# Patient Record
Sex: FEMALE | Race: ASIAN | NOT HISPANIC OR LATINO | Employment: FULL TIME | URBAN - METROPOLITAN AREA
[De-identification: names, ages, dates, MRNs, and addresses within clinical notes are randomized per-mention and may not be internally consistent; named-entity substitution may affect disease eponyms.]

---

## 2017-02-13 ENCOUNTER — HOSPITAL ENCOUNTER (OUTPATIENT)
Dept: MAMMOGRAPHY | Facility: HOSPITAL | Age: 52
Discharge: HOME | End: 2017-02-13
Attending: OBSTETRICS & GYNECOLOGY

## 2017-02-13 DIAGNOSIS — Z12.31 VISIT FOR SCREENING MAMMOGRAM: ICD-10-CM

## 2017-04-10 ENCOUNTER — OFFICE VISIT (OUTPATIENT)
Dept: FAMILY MEDICINE | Facility: CLINIC | Age: 52
End: 2017-04-10

## 2017-04-10 VITALS
BODY MASS INDEX: 27.49 KG/M2 | TEMPERATURE: 97.6 F | HEART RATE: 72 BPM | DIASTOLIC BLOOD PRESSURE: 92 MMHG | RESPIRATION RATE: 20 BRPM | SYSTOLIC BLOOD PRESSURE: 149 MMHG | HEIGHT: 64 IN | OXYGEN SATURATION: 97 % | WEIGHT: 161 LBS

## 2017-04-10 DIAGNOSIS — D3A.00 CARCINOID TUMOR (H): ICD-10-CM

## 2017-04-10 DIAGNOSIS — J31.0 CHRONIC RHINITIS: ICD-10-CM

## 2017-04-10 DIAGNOSIS — J45.30 MILD PERSISTENT ASTHMA WITHOUT COMPLICATION: Primary | ICD-10-CM

## 2017-04-10 DIAGNOSIS — N92.0 EXCESSIVE OR FREQUENT MENSTRUATION: ICD-10-CM

## 2017-04-10 DIAGNOSIS — N94.6 DYSMENORRHEA: ICD-10-CM

## 2017-04-10 RX ORDER — FLUTICASONE PROPIONATE 220 UG/1
2 AEROSOL, METERED RESPIRATORY (INHALATION) 2 TIMES DAILY
COMMUNITY
End: 2017-04-10

## 2017-04-10 RX ORDER — FLUTICASONE PROPIONATE 50 MCG
2 SPRAY, SUSPENSION (ML) NASAL DAILY
COMMUNITY
End: 2017-04-10

## 2017-04-10 RX ORDER — ALBUTEROL SULFATE 90 UG/1
2 AEROSOL, METERED RESPIRATORY (INHALATION) EVERY 6 HOURS
Qty: 3 INHALER | Refills: 3 | Status: SHIPPED | OUTPATIENT
Start: 2017-04-10 | End: 2018-04-20

## 2017-04-10 RX ORDER — FLUTICASONE PROPIONATE 50 MCG
2 SPRAY, SUSPENSION (ML) NASAL DAILY
Qty: 3 BOTTLE | Refills: 3 | Status: SHIPPED | OUTPATIENT
Start: 2017-04-10 | End: 2019-08-06

## 2017-04-10 RX ORDER — FLUTICASONE PROPIONATE 220 UG/1
2 AEROSOL, METERED RESPIRATORY (INHALATION) 2 TIMES DAILY
Qty: 3 INHALER | Refills: 3 | Status: SHIPPED | OUTPATIENT
Start: 2017-04-10 | End: 2018-04-20

## 2017-04-10 RX ORDER — ALBUTEROL SULFATE 90 UG/1
2 AEROSOL, METERED RESPIRATORY (INHALATION) EVERY 6 HOURS
COMMUNITY
End: 2017-04-10

## 2017-04-10 ASSESSMENT — PATIENT HEALTH QUESTIONNAIRE - PHQ9: 5. POOR APPETITE OR OVEREATING: NOT AT ALL

## 2017-04-10 ASSESSMENT — ANXIETY QUESTIONNAIRES
6. BECOMING EASILY ANNOYED OR IRRITABLE: NOT AT ALL
3. WORRYING TOO MUCH ABOUT DIFFERENT THINGS: NOT AT ALL
5. BEING SO RESTLESS THAT IT IS HARD TO SIT STILL: NOT AT ALL
1. FEELING NERVOUS, ANXIOUS, OR ON EDGE: NOT AT ALL
2. NOT BEING ABLE TO STOP OR CONTROL WORRYING: NOT AT ALL
IF YOU CHECKED OFF ANY PROBLEMS ON THIS QUESTIONNAIRE, HOW DIFFICULT HAVE THESE PROBLEMS MADE IT FOR YOU TO DO YOUR WORK, TAKE CARE OF THINGS AT HOME, OR GET ALONG WITH OTHER PEOPLE: NOT DIFFICULT AT ALL
7. FEELING AFRAID AS IF SOMETHING AWFUL MIGHT HAPPEN: NOT AT ALL
GAD7 TOTAL SCORE: 0

## 2017-04-10 NOTE — NURSING NOTE
"51 year old  Chief Complaint   Patient presents with     [facility]     Asthma     Started with seasonal allergies, developed in to asthma x 10--15 years ago.       Blood pressure (!) 149/92, pulse 72, temperature 97.6  F (36.4  C), temperature source Oral, resp. rate 20, height 5' 4\" (162.6 cm), weight 161 lb (73 kg), SpO2 97 %, not currently breastfeeding. Body mass index is 27.64 kg/(m^2).  BP completed using cuff size: regular    There is no problem list on file for this patient.      Wt Readings from Last 2 Encounters:   04/10/17 161 lb (73 kg)     BP Readings from Last 3 Encounters:   04/10/17 (!) 149/92       Current Outpatient Prescriptions   Medication     albuterol (PROAIR HFA/PROVENTIL HFA/VENTOLIN HFA) 108 (90 BASE) MCG/ACT Inhaler     fluticasone (FLOVENT HFA) 220 MCG/ACT Inhaler     fluticasone (FLONASE) 50 MCG/ACT spray     No current facility-administered medications for this visit.        Social History   Substance Use Topics     Smoking status: Never Smoker     Smokeless tobacco: Not on file     Alcohol use Not on file       Health Maintenance Due   Topic Date Due     ASTHMA ACTION PLAN Q1 YR (NO INBASKET)  08/08/1971     ASTHMA CONTROL TEST Q6 MOS (NO INBASKET)  08/08/1971     TETANUS IMMUNIZATION (SYSTEM ASSIGNED)  08/08/1984     PAP SCREENING Q3 YR (SYSTEM ASSIGNED)  08/08/1987     MAMMO SCREEN Q2 YR (SYSTEM ASSIGNED)  08/08/2006     LIPID SCREEN Q5 YR FEMALE (SYSTEM ASSIGNED)  08/08/2011     COLON CANCER SCREEN (SYSTEM ASSIGNED)  08/08/2016       No results found for: PAP    PHQ-2 ( 1999 [employer]) 4/10/2017   Q1: Little interest or pleasure in doing things 0   Q2: Feeling down, depressed or hopeless 0   PHQ-2 Score 0       PHQ-9 SCORE 4/10/2017   Total Score 0       CLAYTON-7 SCORE 4/10/2017   Total Score 0       ACT Total Scores 4/10/2017   ACT TOTAL SCORE (Goal Greater than or Equal to 20) 20   In the past 12 months, how many times did you visit the emergency room for your asthma without being " admitted to the hospital? 0   In the past 12 months, how many times were you hospitalized overnight because of your asthma? 0       Elayne Argueta CMA  April 10, 2017 3:35 PM

## 2017-04-10 NOTE — MR AVS SNAPSHOT
After Visit Summary   4/10/2017    Susie Dupont    MRN: [id number]           Patient Information     Date Of Birth          1965        Visit Information        Provider Department      4/10/2017 3:30 PM Kristel Canales APRN [facility] [employer] [facility]        Today's Diagnoses     Mild persistent asthma without complication    -  1    Chronic rhinitis          Care Instructions      Patient Education    Montelukast Sodium Chewable tablet    Montelukast Sodium Oral granules    Montelukast Sodium Oral tablet  Montelukast Sodium Oral tablet  What is this medicine?  MONTELUKAST (mon te LOO kast) is used to prevent and treat the symptoms of asthma. It is also used to treat allergies. Do not use for an acute asthma attack.  This medicine may be used for other purposes; ask your health care provider or pharmacist if you have questions.  What should I tell my health care provider before I take this medicine?  They need to know if you have any of these conditions:    liver disease    an unusual or allergic reaction to montelukast, other medicines, foods, dyes, or preservatives    pregnant or trying to get pregnant    breast-feeding  How should I use this medicine?  This medicine should be given by mouth. Follow the directions on the prescription label. Take this medicine at the same time every day. You may take this medicine with or without meals. Do not chew the tablets. Do not stop taking your medicine unless your doctor tells you to.  Talk to your pediatrician regarding the use of this medicine in children. Special care may be needed. While this drug may be prescribed for children as young as 15 years of age for selected conditions, precautions do apply.  Overdosage: If you think you have taken too much of this medicine contact a poison control center or emergency room at once.  NOTE: This medicine is only for you. Do not share this medicine with others.  What if I miss a dose?  If you miss a dose,  take it as soon as you can. If it is almost time for your next dose, take only that dose. Do not take double or extra doses.  What may interact with this medicine?    anti-infectives like rifampin and rifabutin    medicines for diabetes like rosiglitazone and repaglinide    medicines for seizures like phenytoin, phenobarbital, and carbamazepine    paclitaxel  This list may not describe all possible interactions. Give your health care provider a list of all the medicines, herbs, non-prescription drugs, or dietary supplements you use. Also tell them if you smoke, drink alcohol, or use illegal drugs. Some items may interact with your medicine.  What should I watch for while using this medicine?  Visit your doctor or health care professional for regular checks on your progress. Tell your doctor or health care professional if your allergy or asthma symptoms do not improve. Take your medicine even when you do not have symptoms. Do not stop taking any of your medicine(s) unless your doctor tells you to.  If you have asthma, talk to your doctor about what to do in an acute asthma attack. Always have your inhaled rescue medicine for asthma attacks with you.  Patients and their families should watch for new or worsening thoughts of suicide or depression. Also watch for sudden changes in feelings such as feeling anxious, agitated, panicky, irritable, hostile, aggressive, impulsive, severely restless, overly excited and hyperactive, or not being able to sleep. Any worsening of mood or thoughts of suicide or dying should be reported to your health care professional right away.  What side effects may I notice from receiving this medicine?  Side effects that you should report to your doctor or health care professional as soon as possible:    allergic reactions like skin rash or hives, or swelling of the face, lips, or tongue    breathing problems    confusion    dark urine    fever or infection    flu-like  symptoms    hallucinations    painful lumps under the skin    pain, tingling, numbness in the hands or feet    sinus pain or swelling    suicidal thoughts or other mood changes    trouble sleeping    unusual bleeding or bruising    yellowing of the eyes or skin  Side effects that usually do not require medical attention (report to your doctor or health care professional if they continue or are bothersome):    cough    dizziness    drowsiness    headache    nightmares    stomach upset    stuffy nose  This list may not describe all possible side effects. Call your doctor for medical advice about side effects. You may report side effects to FDA at 3-100-STJ-7838.  Where should I keep my medicine?  Keep out of the reach of children.  Store at room temperature between 15 and 30 degrees C (59 and 86 degrees F). Protect from light and moisture. Keep this medicine in the original bottle. Throw away any unused medicine after the expiration date.  NOTE:This sheet is a summary. It may not cover all possible information. If you have questions about this medicine, talk to your doctor, pharmacist, or health care provider. Copyright  2016 [employer] Standard              Follow-ups after your visit        Follow-up notes from your care team     Return in about 2 months (around 6/10/2017).      Who to contact     Please call your clinic at [phone] to:    Ask questions about your health    Make or cancel appointments    Discuss your medicines    Learn about your test results    Speak to your doctor   If you have compliments or concerns about an experience at your clinic, or if you wish to file a complaint, please contact [facility] Patient Relations at [phone] or email us at [email or web address]         Additional Information About Your Visit        [email or web address].com Information     [email or web address] is an electronic gateway that provides easy, online access to your medical records. With Decide.com, you can  "request a clinic appointment, read your test results, renew a prescription or communicate with your care team.     To sign up for [employer] visit the website at [email or web address]   You will be asked to enter the access code listed below, as well as some personal information. Please follow the directions to create your username and password.     Your access code is: [email or web address]  Expires: 2017  4:30 PM     Your access code will  in 90 days. If you need help or a new code, please contact your [facility] or call [phone] for assistance.        Care EveryWhere ID     This is your Care EveryWhere ID. This could be used by other organizations to access your [facility] medical records  [id number]        Your Vitals Were     Pulse Temperature Respirations Height Pulse Oximetry Breastfeeding?    72 97.6  F (36.4  C) (Oral) 20 5' 4\" (162.6 cm) 97% No    BMI (Body Mass Index)                   27.64 kg/m2            Blood Pressure from Last 3 Encounters:   04/10/17 (!) 149/92    Weight from Last 3 Encounters:   04/10/17 161 lb (73 kg)              Today, you had the following     No orders found for display         Where to get your medicines      These medications were sent to [facility]/pharmacy #[id number] - [place], [place] - [address] [address]     Phone:  [phone]     albuterol 108 (90 BASE) MCG/ACT Inhaler    fluticasone 220 MCG/ACT Inhaler    fluticasone 50 MCG/ACT spray          Primary Care Provider Office Phone # Fax #    LAZARA Singleton [facility] [phone]       [facility] NURSES [facility] [address] [place] [id number]        Thank you!     Thank you for choosing [facility]  for your care. Our goal is always to provide you with excellent care. Hearing back from our patients is one way we can continue to improve our services. Please take a few minutes to complete the written survey that you may receive in the " mail after your visit with us. Thank you!             Your Updated Medication List - Protect others around you: Learn how to safely use, store and throw away your medicines at www.disposemymeds.org.          This list is accurate as of: 4/10/17  4:30 PM.  Always use your most recent med list.                   Brand Name Dispense Instructions for use    albuterol 108 (90 BASE) MCG/ACT Inhaler    PROAIR HFA/PROVENTIL HFA/VENTOLIN HFA    3 Inhaler    Inhale 2 puffs into the lungs every 6 hours       fluticasone 220 MCG/ACT Inhaler    FLOVENT HFA    3 Inhaler    Inhale 2 puffs into the lungs 2 times daily       fluticasone 50 MCG/ACT spray    FLONASE    3 Bottle    Spray 2 sprays into both nostrils daily

## 2017-04-10 NOTE — PATIENT INSTRUCTIONS
Patient Education    Montelukast Sodium Chewable tablet    Montelukast Sodium Oral granules    Montelukast Sodium Oral tablet  Montelukast Sodium Oral tablet  What is this medicine?  MONTELUKAST (mon te LOO kast) is used to prevent and treat the symptoms of asthma. It is also used to treat allergies. Do not use for an acute asthma attack.  This medicine may be used for other purposes; ask your health care provider or pharmacist if you have questions.  What should I tell my health care provider before I take this medicine?  They need to know if you have any of these conditions:    liver disease    an unusual or allergic reaction to montelukast, other medicines, foods, dyes, or preservatives    pregnant or trying to get pregnant    breast-feeding  How should I use this medicine?  This medicine should be given by mouth. Follow the directions on the prescription label. Take this medicine at the same time every day. You may take this medicine with or without meals. Do not chew the tablets. Do not stop taking your medicine unless your doctor tells you to.  Talk to your pediatrician regarding the use of this medicine in children. Special care may be needed. While this drug may be prescribed for children as young as 15 years of age for selected conditions, precautions do apply.  Overdosage: If you think you have taken too much of this medicine contact a poison control center or emergency room at once.  NOTE: This medicine is only for you. Do not share this medicine with others.  What if I miss a dose?  If you miss a dose, take it as soon as you can. If it is almost time for your next dose, take only that dose. Do not take double or extra doses.  What may interact with this medicine?    anti-infectives like rifampin and rifabutin    medicines for diabetes like rosiglitazone and repaglinide    medicines for seizures like phenytoin, phenobarbital, and carbamazepine    paclitaxel  This list may not describe all possible  interactions. Give your health care provider a list of all the medicines, herbs, non-prescription drugs, or dietary supplements you use. Also tell them if you smoke, drink alcohol, or use illegal drugs. Some items may interact with your medicine.  What should I watch for while using this medicine?  Visit your doctor or health care professional for regular checks on your progress. Tell your doctor or health care professional if your allergy or asthma symptoms do not improve. Take your medicine even when you do not have symptoms. Do not stop taking any of your medicine(s) unless your doctor tells you to.  If you have asthma, talk to your doctor about what to do in an acute asthma attack. Always have your inhaled rescue medicine for asthma attacks with you.  Patients and their families should watch for new or worsening thoughts of suicide or depression. Also watch for sudden changes in feelings such as feeling anxious, agitated, panicky, irritable, hostile, aggressive, impulsive, severely restless, overly excited and hyperactive, or not being able to sleep. Any worsening of mood or thoughts of suicide or dying should be reported to your health care professional right away.  What side effects may I notice from receiving this medicine?  Side effects that you should report to your doctor or health care professional as soon as possible:    allergic reactions like skin rash or hives, or swelling of the face, lips, or tongue    breathing problems    confusion    dark urine    fever or infection    flu-like symptoms    hallucinations    painful lumps under the skin    pain, tingling, numbness in the hands or feet    sinus pain or swelling    suicidal thoughts or other mood changes    trouble sleeping    unusual bleeding or bruising    yellowing of the eyes or skin  Side effects that usually do not require medical attention (report to your doctor or health care professional if they continue or are bothersome):    cough     dizziness    drowsiness    headache    nightmares    stomach upset    stuffy nose  This list may not describe all possible side effects. Call your doctor for medical advice about side effects. You may report side effects to FDA at 5-724-IPP-3276.  Where should I keep my medicine?  Keep out of the reach of children.  Store at room temperature between 15 and 30 degrees C (59 and 86 degrees F). Protect from light and moisture. Keep this medicine in the original bottle. Throw away any unused medicine after the expiration date.  NOTE:This sheet is a summary. It may not cover all possible information. If you have questions about this medicine, talk to your doctor, pharmacist, or health care provider. Copyright  2016 Gold Standard        My Asthma Action Plan  Name: Susie Dupont   YOB: 1965  Date: 4/11/2017   My doctor: LAZARA Morales CNP   My clinic: [facility]        My Control Medicine: Fluticasone propionate (Flovent) -   mcg 2puff BID  My Rescue Medicine: Albuterol (Proair/Ventolin/Proventil) inhaler 2 puffs prior to activity, 2 puff q6hp SOB, Wheezing   My Asthma Severity: mild persistent  Avoid your asthma triggers: pollens and use rescue inhaler prior to vigorous exercise               GREEN ZONE     Good Control    I feel good    No cough or wheeze    Can work, sleep and play without asthma symptoms       Take your asthma control medicine every day.     1. If exercise triggers your asthma, take your rescue medication    15 minutes before exercise or sports, and    During exercise if you have asthma symptoms  2. Spacer to use with inhaler: If you have a spacer, make sure to use it with your inhaler             YELLOW ZONE     Getting Worse  I have ANY of these:    I do not feel good    Cough or wheeze    Chest feels tight    Wake up at night   1. Keep taking your Green Zone medications  2. Start taking your rescue medicine:    every 20 minutes for up to 1 hour. Then  every 4 hours for 24-48 hours.  3. If you stay in the Yellow Zone for more than 12-24 hours, contact your doctor.  4. If you do not return to the Green Zone in 12-24 hours or you get worse, start taking your oral steroid medicine if prescribed by your provider.           RED ZONE     Medical Alert - Get Help  I have ANY of these:    I feel awful    Medicine is not helping    Breathing getting harder    Trouble walking or talking    Nose opens wide to breathe       1. Take your rescue medicine NOW  2. If your provider has prescribed an oral steroid medicine, start taking it NOW  3. Call your doctor NOW  4. If you are still in the Red Zone after 20 minutes and you have not reached your doctor:    Take your rescue medicine again and    Call 911 or go to the emergency room right away    See your regular doctor within 2 weeks of an Emergency Room or Urgent Care visit for follow-up treatment.        Electronically signed by: Kristel Canales, April 11, 2017    Annual Reminders:  Meet with Asthma Educator,  Flu Shot in the Fall, consider Pneumonia Vaccination for patients with asthma (aged 19 and older).    Pharmacy: [facility]/PHARMACY #[id number] [place] [address] [address]                    Asthma Triggers  How To Control Things That Make Your Asthma Worse    Triggers are things that make your asthma worse.  Look at the list below to help you find your triggers and what you can do about them.  You can help prevent asthma flare-ups by staying away from your triggers.      Trigger                                                          What you can do   Cigarette Smoke  Tobacco smoke can make asthma worse. Do not allow smoking in your home, car or around you.  Be sure no one smokes at a child s day care or school.  If you smoke, ask your health care provider for ways to help you quit.  Ask family members to quit too.  Ask your health care provider for a referral to Quit Plan to help you quit smoking, or call  6-250-967-PLAN.     Colds, Flu, Bronchitis  These are common triggers of asthma. Wash your hands often.  Don t touch your eyes, nose or mouth.  Get a flu shot every year.     Dust Mites  These are tiny bugs that live in cloth or carpet. They are too small to see. Wash sheets and blankets in hot water every week.   Encase pillows and mattress in dust mite proof covers.  Avoid having carpet if you can. If you have carpet, vacuum weekly.   Use a dust mask and HEPA vacuum.   Pollen and Outdoor Mold  Some people are allergic to trees, grass, or weed pollen, or molds. Try to keep your windows closed.  Limit time out doors when pollen count is high.   Ask you health care provider about taking medicine during allergy season.     Animal Dander  Some people are allergic to skin flakes, urine or saliva from pets with fur or feathers. Keep pets with fur or feathers out of your home.    If you can t keep the pet outdoors, then keep the pet out of your bedroom.  Keep the bedroom door closed.  Keep pets off cloth furniture and away from stuffed toys.     Mice, Rats, and Cockroaches  Some people are allergic to the waste from these pests.   Cover food and garbage.  Clean up spills and food crumbs.  Store grease in the refrigerator.   Keep food out of the bedroom.   Indoor Mold  This can be a trigger if your home has high moisture. Fix leaking faucets, pipes, or other sources of water.   Clean moldy surfaces.  Dehumidify basement if it is damp and smelly.   Smoke, Strong Odors, and Sprays  These can reduce air quality. Stay away from strong odors and sprays, such as perfume, powder, hair spray, paints, smoke incense, paint, cleaning products, candles and new carpet.   Exercise or Sports  Some people with asthma have this trigger. Be active!  Ask your doctor about taking medicine before sports or exercise to prevent symptoms.    Warm up for 5-10 minutes before and after sports or exercise.     Other Triggers of Asthma  Cold air:   Cover your nose and mouth with a scarf.  Sometimes laughing or crying can be a trigger.  Some medicines and food can trigger asthma.

## 2017-04-11 PROBLEM — D3A.00 CARCINOID TUMOR (H): Status: ACTIVE | Noted: 2017-04-11

## 2017-04-11 PROBLEM — J45.30 MILD PERSISTENT ASTHMA WITHOUT COMPLICATION: Status: ACTIVE | Noted: 2017-04-11

## 2017-04-11 PROBLEM — J31.0 CHRONIC RHINITIS: Status: ACTIVE | Noted: 2017-04-11

## 2017-04-11 PROBLEM — N92.0 EXCESSIVE OR FREQUENT MENSTRUATION: Status: ACTIVE | Noted: 2017-04-11

## 2017-04-11 PROBLEM — N94.6 DYSMENORRHEA: Status: ACTIVE | Noted: 2017-04-11

## 2017-04-11 ASSESSMENT — ASTHMA QUESTIONNAIRES: ACT_TOTALSCORE: 20

## 2017-04-11 ASSESSMENT — PATIENT HEALTH QUESTIONNAIRE - PHQ9: SUM OF ALL RESPONSES TO PHQ QUESTIONS 1-9: 0

## 2017-04-11 ASSESSMENT — ANXIETY QUESTIONNAIRES: GAD7 TOTAL SCORE: 0

## 2017-04-11 NOTE — PROGRESS NOTES
HPI:       Susie Dupont is a 51 year old who presents today to establish care and to discuss her asthma regimen. She was previously seeing a primary care physician who retired in February. She saw him fall of 2016. She is unsure if she had labs or not.     Asthma  She states she has been asthmatic for approximately 15 years. It first started as seasonal allergies, then wheezing and difficulty breathing. She has been off of her flovent and flonase for 1 year. She moved to [facility] around that time and noticed her allergy symptoms were greatly improved. Knowing allergens are triggers for her asthma she wondered if that too would improve. She has noticed an increase in her allergy symptoms now that the season is changing, she also notices a tightness in her chest. She is unsure when the last time she did spirometry was. She does take her albuterol inhaler every morning prior to exercise as cardio exercise will trigger her asthma as well. She doesn't need her albuterol for rescue unless over exerts herself or comes in contact with allergens. She doesn't know if she has specific allergies.     Carcinoid of the lung  States she has 2 carcinoids of the right lower and middle lung. These were resected years ago and has not had any follow up since. Pulmonology stated she didn't need further surveillance.     Menometrorrhagia  States she has had problems all her life, runs in her family. She previously had very heavy and very long periods. Also very painful. She has tried non hormone eluding IUDs and most recently had her merena replaced. She states they work best to control bleeding and pain the first couple of years then decline in efficacy after this time. She still has periods and most recently a few months ago had to leave work due to heaviness of bleeding. She is following with OB/GYN for this. She may need ablation or hysterectomy at some point, but trying to avoid this.        Preventative  Has not had a  colonoscopy, but does know she needs one. Recommended to get this done  Mammogram done in 2016-this was ordered by her OB/GYN and was normal, she had breast discharge and was concerned  Flu-Vaccine is done this year  Uptodate on dental and eye exams  She has not had pneumovax, but does want this.     Asthma Follow Up    Concerns medication regimen and long term steroid use       Description:  Cough: Yes Details: at night  Wheezing:  Yes Details: with exercise and at night on occassion  Shortness of breath:  no  Fevers:  no  Fatigue: no    Decreased appetite:no  Chest pain or discomfort: Yes Details: currently yes with season change, in bases of lungs  Leg (swelling) edema: no  Nasal congestion: no  Sore throat: no  Palpitations:no       How often are current asthma medications being used:                  Controller medicine daily:  Yes Details: pt states she self discontinued flovent and flonase 1 year ago. 2 days ago she started her flovent again daily, but only taking once per day. She is on 220mcg                    Rescue nebulizer or Inhaler during an average week: Only prior to exercise daily, uses only a couple times per month for rescue           History:  Able to do normal responsibilities:  Yes Details: with exception of season changes/currently feeling chest tightness, short of breath         Do symptoms wake you at nigh no           Triggers: pollens, occupational exposure and exercise or sports        History of Urgent care/ER visits:  no  History of hospitalizations:  no      If hospitalized-History of intubation: no                                                        Asthma Control Test score for today:   ACT Total Scores 4/10/2017   ACT TOTAL SCORE (Goal Greater than or Equal to 20) 20   In the past 12 months, how many times did you visit the emergency room for your asthma without being admitted to the hospital? 0   In the past 12 months, how many times were you hospitalized overnight because of  your asthma? 0             Asthma Action Plan done this year : YES         Adherence and Exercise  Medication side effects: no  How often is a medication missed? Has not been on controller  meds for 1 year  Are you able to follow the treatment plan? Yes  Exercise:walking , biking and running 6-7 days/week for an average of 30-45 minutes     Problem, Medication and Allergy Lists were   reviewed and are current.     Patient Active Problem List    Diagnosis Date Noted     Mild persistent asthma without complication 04/11/2017     Priority: Medium     Excessive or frequent menstruation 04/11/2017     Priority: Medium     Chronic rhinitis 04/11/2017     Priority: Medium     Dysmenorrhea 04/11/2017     Priority: Medium     Carcinoid tumor 04/11/2017     Priority: Medium   ,     Current Outpatient Prescriptions   Medication Sig Dispense Refill     albuterol (PROAIR HFA/PROVENTIL HFA/VENTOLIN HFA) 108 (90 BASE) MCG/ACT Inhaler Inhale 2 puffs into the lungs every 6 hours 3 Inhaler 3     fluticasone (FLONASE) 50 MCG/ACT spray Spray 2 sprays into both nostrils daily 3 Bottle 3     fluticasone (FLOVENT HFA) 220 MCG/ACT Inhaler Inhale 2 puffs into the lungs 2 times daily 3 Inhaler 3   ,     Allergies   Allergen Reactions     Seasonal Allergies Difficulty breathing     Apples [Apple] Rash     Kiwi Rash     Patient is   a new patient to this clinic and so  I reviewed/updated the Past Medical History, the Family History and the Social History. ,   Past Medical History:   Diagnosis Date     Carcinoid tumor     right lung, upper and middle lobe resected     Fibroid (bleeding) (uterine)     chonic ongoing     Uncomplicated asthma    ,   Family History     Problem (# of Occurrences) Relation (Name,Age of Onset)    CANCER (1) Father    Coronary Artery Disease (3) Mother, Maternal Grandfather, Paternal Grandfather    Parkinsonism (1) Mother    Seasonal/Environmental Allergies (1) Sister       and   Social History     Social History      "Marital status:      Spouse name: N/A     Number of children: N/A     Years of education: N/A     Social History Main Topics     Smoking status: Never Smoker     Smokeless tobacco: None     Alcohol use Yes      Comment: 2-3 glasses of wine per week     Drug use: No     Sexual activity: Yes     Birth control/ protection: IUD      Comment: OB/GYN [place]     Other Topics Concern     None     Social History Narrative     None            Review of Systems:   Review of Systems  ROS: 10 point ROS neg other than the symptoms noted above in the HPI.            Physical Exam:     Patient Vitals for the past 24 hrs:   BP Temp Temp src Pulse Resp SpO2 Height Weight   04/10/17 1530 (!) 149/92 97.6  F (36.4  C) Oral 72 20 97 % 5' 4\" (162.6 cm) 161 lb (73 kg)     Body mass index is 27.64 kg/(m^2).  Vitals were reviewed and were normal, repeat BP is 138/72     Physical Exam  GENERAL APPEARANCE: healthy, alert and no distress  EYES: Eyes grossly normal to inspection, PERRL and conjunctivae and sclerae normal  HENT: ear canals and TM's normal, nose and mouth without ulcers or lesions, oropharynx clear and oral mucous membranes moist  NECK: no adenopathy, no asymmetry, masses, or scars and thyroid normal to palpation  RESP: lungs clear to auscultation - no rales, rhonchi or wheezes  BREAST: deferred  CV: regular rates and rhythm, normal S1 S2, no S3 or S4, no murmur, click or rub, no peripheral edema and peripheral pulses strong  ABDOMEN: soft, nontender, no hepatosplenomegaly, no masses and bowel sounds normal   (female): deferred  MS: no musculoskeletal defects are noted and gait is age appropriate without ataxia  SKIN: no suspicious lesions or rashes  NEURO: Normal strength and tone, sensory exam grossly normal, mentation intact and speech normal  PSYCH: mentation appears normal and affect normal/bright      Results:      Results from the last 24 hoursNo results found for this or any previous visit (from the past 24 " hour(s)).  Assessment and Plan     Susie was seen today for establish care and asthma.    Diagnoses and all orders for this visit:    Mild persistent asthma without complication  Pt is very aware of symptoms and triggers, she would like to be off of her inhaler in winter months since things have improved since moving. Detailed discussion with pt regarding options for treatment, discussed step down therapy to lower dose of daily flovent, continued discontinuation (not recommended due to increase in symptoms), changing to other monotherpay/preventative medication such as montelukast (information given to pt), or try using inhaled and nasal steroids as well as antihistamine starting at change of season through summer until winter or allergens are less present. Pt prefers this, will use my chart or call if any changes.  Plan to follow up in 2 months, will review previous records, may need PFT for baseline as been years since  albuterol (PROAIR HFA/PROVENTIL HFA/VENTOLIN HFA) 108 (90 BASE) MCG/ACT Inhaler; Inhale 2 puffs into the lungs every 6 hours as needed, and prior to activity  fluticasone (FLOVENT HFA) 220 MCG/ACT Inhaler; Inhale 2 puffs into the lungs 2 times daily starting March and can stop when freeze/winter comes. If any decline in lung function, increase in symptoms, quality of life, will resume daily regardless of seasons. Start with 1 puff BID, if not at goal increase to 2 puff BID.  Claritin daily as above    Chronic rhinitis  fluticasone (FLONASE) 50 MCG/ACT spray; Spray 2 sprays into both nostrils daily  claritin daily  Nasal rinses    Excessive or frequent menstruation  OB/GYN following, improved and stable for now    Dysmenorrhea  OB/GYN following, stable for now    Carcinoid tumor  Resected, RML and RLL.   No recurrence or issues post surgical  Will review records     Recommend pneumovax next visit, RTC in 2 months, sooner if needed    Medications Discontinued During This Encounter   Medication  Reason     albuterol (PROAIR HFA/PROVENTIL HFA/VENTOLIN HFA) 108 (90 BASE) MCG/ACT Inhaler Reorder     fluticasone (FLONASE) 50 MCG/ACT spray Reorder     fluticasone (FLOVENT HFA) 220 MCG/ACT Inhaler Reorder     Options for treatment and follow-up care were reviewed with the patient. Susie Dupont  engaged in the decision making process and verbalized understanding of the options discussed and agreed with the final plan.    LAZARA Morales CNP

## 2017-04-13 ENCOUNTER — TELEPHONE (OUTPATIENT)
Dept: FAMILY MEDICINE | Facility: CLINIC | Age: 52
End: 2017-04-13

## 2017-04-13 NOTE — TELEPHONE ENCOUNTER
Received notice from [facility] that pt's insurance will not cover Flovent. Pt has been on this for years. LM with pt to call back regarding insurance change or need for PA in past. May need to change inhaled steroid.

## 2017-04-13 NOTE — TELEPHONE ENCOUNTER
Patient is returning a call regarding medication. She is requesting a call back at the incoming number. She will not be available today between the hours of 2:15 - 3:30.

## 2017-04-14 NOTE — TELEPHONE ENCOUNTER
Missed window to call pt back, called her back today, still trying to ascertain if pt has tried other inhaled steroids or had issues in past with getting flovent, change of insurance, etc

## 2017-05-04 ENCOUNTER — RECORDS - HEALTHEAST (OUTPATIENT)
Dept: ADMINISTRATIVE | Facility: OTHER | Age: 52
End: 2017-05-04

## 2017-05-05 ENCOUNTER — RECORDS - HEALTHEAST (OUTPATIENT)
Dept: ADMINISTRATIVE | Facility: OTHER | Age: 52
End: 2017-05-05

## 2017-05-16 ENCOUNTER — RECORDS - HEALTHEAST (OUTPATIENT)
Dept: ADMINISTRATIVE | Facility: OTHER | Age: 52
End: 2017-05-16

## 2017-05-30 ENCOUNTER — COMMUNICATION - HEALTHEAST (OUTPATIENT)
Dept: TELEHEALTH | Facility: CLINIC | Age: 52
End: 2017-05-30

## 2017-05-30 ENCOUNTER — HOSPITAL ENCOUNTER (OUTPATIENT)
Dept: MAMMOGRAPHY | Facility: HOSPITAL | Age: 52
Discharge: HOME | End: 2017-05-30
Attending: OBSTETRICS & GYNECOLOGY

## 2017-05-30 DIAGNOSIS — N64.52 NIPPLE DISCHARGE: ICD-10-CM

## 2017-06-13 ENCOUNTER — OFFICE VISIT - HEALTHEAST (OUTPATIENT)
Dept: SURGERY | Facility: CLINIC | Age: 52
End: 2017-06-13

## 2017-06-13 DIAGNOSIS — N64.52 DISCHARGE FROM LEFT NIPPLE: ICD-10-CM

## 2017-06-23 ENCOUNTER — RECORDS - HEALTHEAST (OUTPATIENT)
Dept: ADMINISTRATIVE | Facility: OTHER | Age: 52
End: 2017-06-23

## 2017-06-23 ENCOUNTER — OFFICE VISIT (OUTPATIENT)
Dept: FAMILY MEDICINE | Facility: CLINIC | Age: 52
End: 2017-06-23

## 2017-06-23 VITALS
TEMPERATURE: 98 F | HEIGHT: 65 IN | OXYGEN SATURATION: 96 % | HEART RATE: 70 BPM | WEIGHT: 167.1 LBS | SYSTOLIC BLOOD PRESSURE: 133 MMHG | DIASTOLIC BLOOD PRESSURE: 83 MMHG | BODY MASS INDEX: 27.84 KG/M2 | RESPIRATION RATE: 16 BRPM

## 2017-06-23 DIAGNOSIS — N64.52 DISCHARGE FROM LEFT NIPPLE: ICD-10-CM

## 2017-06-23 DIAGNOSIS — Z01.818 PREOP GENERAL PHYSICAL EXAM: Primary | ICD-10-CM

## 2017-06-23 DIAGNOSIS — K21.9 GASTROESOPHAGEAL REFLUX DISEASE, ESOPHAGITIS PRESENCE NOT SPECIFIED: ICD-10-CM

## 2017-06-23 DIAGNOSIS — J45.30 MILD PERSISTENT ASTHMA WITHOUT COMPLICATION: ICD-10-CM

## 2017-06-23 DIAGNOSIS — N92.0 MENORRHAGIA WITH REGULAR CYCLE: ICD-10-CM

## 2017-06-23 LAB — HEMOGLOBIN: 11 G/DL (ref 11.7–15.7)

## 2017-06-23 RX ORDER — LORATADINE 10 MG/1
10 TABLET ORAL DAILY
COMMUNITY

## 2017-06-23 RX ORDER — TRANEXAMIC ACID 650 MG/1
1300 TABLET ORAL PRN
COMMUNITY
End: 2018-06-14

## 2017-06-23 ASSESSMENT — ANXIETY QUESTIONNAIRES
5. BEING SO RESTLESS THAT IT IS HARD TO SIT STILL: NOT AT ALL
6. BECOMING EASILY ANNOYED OR IRRITABLE: NOT AT ALL
IF YOU CHECKED OFF ANY PROBLEMS ON THIS QUESTIONNAIRE, HOW DIFFICULT HAVE THESE PROBLEMS MADE IT FOR YOU TO DO YOUR WORK, TAKE CARE OF THINGS AT HOME, OR GET ALONG WITH OTHER PEOPLE: NOT DIFFICULT AT ALL
1. FEELING NERVOUS, ANXIOUS, OR ON EDGE: NOT AT ALL
3. WORRYING TOO MUCH ABOUT DIFFERENT THINGS: NOT AT ALL
2. NOT BEING ABLE TO STOP OR CONTROL WORRYING: NOT AT ALL
7. FEELING AFRAID AS IF SOMETHING AWFUL MIGHT HAPPEN: NOT AT ALL
GAD7 TOTAL SCORE: 0

## 2017-06-23 ASSESSMENT — PATIENT HEALTH QUESTIONNAIRE - PHQ9: 5. POOR APPETITE OR OVEREATING: NOT AT ALL

## 2017-06-23 ASSESSMENT — PAIN SCALES - GENERAL: PAINLEVEL: NO PAIN (0)

## 2017-06-23 NOTE — NURSING NOTE
"51 year old  Chief Complaint   Patient presents with     Pre-Op Exam     Dr. Ana William, [facility], (out-patient) Date:06/28/2017. Breast biopsy, (left breast) papoloma. Fax #: [phone].        Blood pressure 133/83, pulse 70, temperature 98  F (36.7  C), temperature source Oral, resp. rate 16, height 5' 4.5\" (163.8 cm), weight 167 lb 1.6 oz (75.8 kg), SpO2 96 %, not currently breastfeeding. Body mass index is 28.24 kg/(m^2).  BP completed using cuff size: regular    Patient Active Problem List   Diagnosis     Mild persistent asthma without complication     Excessive or frequent menstruation     Chronic rhinitis     Dysmenorrhea     Carcinoid tumor       Wt Readings from Last 2 Encounters:   06/23/17 167 lb 1.6 oz (75.8 kg)   04/10/17 161 lb (73 kg)     BP Readings from Last 3 Encounters:   06/23/17 133/83   04/10/17 (!) 149/92       Current Outpatient Prescriptions   Medication     loratadine (CLARITIN) 10 MG tablet     tranexamic acid (LYSTEDA) 650 MG tablet     albuterol (PROAIR HFA/PROVENTIL HFA/VENTOLIN HFA) 108 (90 BASE) MCG/ACT Inhaler     fluticasone (FLONASE) 50 MCG/ACT spray     fluticasone (FLOVENT HFA) 220 MCG/ACT Inhaler     No current facility-administered medications for this visit.        Social History   Substance Use Topics     Smoking status: Never Smoker     Smokeless tobacco: Not on file     Alcohol use Yes      Comment: 2-3 glasses of wine per week       Health Maintenance Due   Topic Date Due     TETANUS IMMUNIZATION (SYSTEM ASSIGNED)  08/08/1984     PAP SCREENING Q3 YR (SYSTEM ASSIGNED)  08/08/1987     MAMMO SCREEN Q2 YR (SYSTEM ASSIGNED)  08/08/2006     LIPID SCREEN Q5 YR FEMALE (SYSTEM ASSIGNED)  08/08/2011     COLON CANCER SCREEN (SYSTEM ASSIGNED)  08/08/2016       No results found for: PAP    PHQ-2 ( 1999 Pfizer) 6/23/2017 4/10/2017   Q1: Little interest or pleasure in doing things 0 0   Q2: Feeling down, depressed or hopeless 0 0   PHQ-2 Score 0 0       PHQ-9 " SCORE 4/10/2017 6/23/2017   Total Score 0 0       CLAYTON-7 SCORE 4/10/2017 6/23/2017   Total Score 0 0       ACT Total Scores 4/10/2017   ACT TOTAL SCORE (Goal Greater than or Equal to 20) 20   In the past 12 months, how many times did you visit the emergency room for your asthma without being admitted to the hospital? 0   In the past 12 months, how many times were you hospitalized overnight because of your asthma? 0       Ernestina Payton MA  June 23, 2017 8:44 AM

## 2017-06-23 NOTE — MR AVS SNAPSHOT
After Visit Summary   2017    Susie Dupont    MRN: [id number]           Patient Information     Date Of Birth          1965        Visit Information        Provider Department      2017 8:30 AM Dianne Kuo NP [facility]        Today's Diagnoses     Preop general physical exam    -  1    Discharge from left nipple        Mild persistent asthma without complication        Menorrhagia with regular cycle        Gastroesophageal reflux disease, esophagitis presence not specified           Follow-ups after your visit        Follow-up notes from your care team     Return in about 1 month (around 2017) for Spirometry.      Who to contact     Please call your clinic at [phone] to:    Ask questions about your health    Make or cancel appointments    Discuss your medicines    Learn about your test results    Speak to your doctor   If you have compliments or concerns about an experience at your clinic, or if you wish to file a complaint, please contact [facility] Patient Relations at [phone] or email us at [email or web address]         Additional Information About Your Visit        MyChart Information     [employer] is an electronic gateway that provides easy, online access to your medical records. With [employer] you can request a clinic appointment, read your test results, renew a prescription or communicate with your care team.     To sign up for [employer] visit the website at [email or web address]   You will be asked to enter the access code listed below, as well as some personal information. Please follow the directions to create your username and password.     Your access code is: [email or web address]  Expires: 2017  4:30 PM     Your access code will  in 90 days. If you need help or a new code, please contact your [facility] or call [phone] for assistance.        Care EveryWhere ID     This  "is your Care EveryWhere ID. This could be used by other organizations to access your [place] medical records  [id number]        Your Vitals Were     Pulse Temperature Respirations Height Pulse Oximetry Breastfeeding?    70 98  F (36.7  C) (Oral) 16 5' 4.5\" (163.8 cm) 96% No    BMI (Body Mass Index)                   28.24 kg/m2            Blood Pressure from Last 3 Encounters:   06/23/17 133/83   04/10/17 (!) 149/92    Weight from Last 3 Encounters:   06/23/17 167 lb 1.6 oz (75.8 kg)   04/10/17 161 lb (73 kg)              We Performed the Following     Hemoglobin (HGB) ([employer])        Primary Care Provider Office Phone # Fax #    Kristel LAZARA [place] [facility] [phone]       [facility] NURS[facility] [address] [place] [address]        Equal Access to Services     PARTH VALENTINE : Hadii aad ku hadasho Soomaali, waaxda luqadaha, qaybta kaalmada adeegyada, waxay idiin hayjuarezn ann gentile . So [facility] [phone].    ATENCIÓN: Si habla español, tiene a conroy disposición servicios gratuitos de asistencia lingüística. [place] al [phone].    We comply with applicable federal civil rights laws and [place] laws. We do not discriminate on the basis of race, color, national origin, age, disability sex, sexual orientation or gender identity.            Thank you!     Thank you for choosing [facility]  for your care. Our goal is always to provide you with excellent care. Hearing back from our patients is one way we can continue to improve our services. Please take a few minutes to complete the written survey that you may receive in the mail after your visit with us. Thank you!             Your Updated Medication List - Protect others around you: Learn how to safely use, store and throw away your medicines at www.disposemymeds.org.          This list is accurate as of: 6/23/17  9:48 AM.  Always use your most recent med list.                   Brand Name Dispense Instructions for use " Diagnosis    albuterol 108 (90 BASE) MCG/ACT Inhaler    PROAIR HFA/PROVENTIL HFA/VENTOLIN HFA    3 Inhaler    Inhale 2 puffs into the lungs every 6 hours    Mild persistent asthma without complication       CLARITIN 10 MG tablet   Generic drug:  loratadine      Take 10 mg by mouth daily        fluticasone 220 MCG/ACT Inhaler    FLOVENT HFA    3 Inhaler    Inhale 2 puffs into the lungs 2 times daily    Mild persistent asthma without complication       fluticasone 50 MCG/ACT spray    FLONASE    3 Bottle    Spray 2 sprays into both nostrils daily    Chronic rhinitis       tranexamic acid 650 MG tablet    LYSTEDA     Take 1,300 mg by mouth as needed (Menorragea.)

## 2017-06-23 NOTE — PROGRESS NOTES
[facility]  [address] [address]  [phone]  Dept: [phone]    PRE-OP EVALUATION:  Today's date: 2017    Suise Dupont (: 1965) presents for pre-operative evaluation assessment as requested by Dr. Ana William. She requires evaluation and anesthesia risk assessment prior to undergoing surgery/procedure for treatment of ductal papilloma .  Proposed procedure: excisional biopsy of left breast.    Date of Surgery/ Procedure: 2017  Time of Surgery/ Procedure: Arrive in the morning  Hospital/Surgical Facility: [facility]  [phone]  Primary Physician: Dianne Kuo NP-C  Type of Anesthesia Anticipated: Local     Patient has a Health Care Directive or Living Will:  NO    1. NO - Do you have a history of heart attack, stroke, stent, bypass or surgery on an artery in the head, neck, heart or legs?  2. NO - Do you ever have any pain or discomfort in your chest?  3. NO - Do you have a history of Heart Failure?  4. NO - Are you troubled by shortness of breath when: walking on the level, up a slight hill or at night?  5. NO - Do you currently have a cold, bronchitis or other respiratory infection?  6. NO - Do you have a cough, shortness of breath or wheezing?  7. NO - Do you sometimes get pains in the calves of your legs when you walk?  8. YES - Do you or anyone in your family have previous history of blood clots? Sister had DVT thought it was due to travel, no reoccurrence of DVTs.   9. NO - Do you or does anyone in your family have a serious bleeding problem such as prolonged bleeding following surgeries or cuts?  10. NO - Have you ever had problems with anemia or been told to take iron pills? Has menorrhagia, borderline anemia due to menstrual bleeding, she will take tranexamic acid as needed for menorrhagia.   11. NO - Have you had any abnormal blood loss such as black, tarry or bloody stools, or abnormal vaginal bleeding?  12. NO - Have you ever  had a blood transfusion?  13. NO - Have you or any of your relatives ever had problems with anesthesia?  14. NO - Do you have sleep apnea, excessive snoring or daytime drowsiness?  15. NO - Do you have any prosthetic heart valves?  16. NO - Do you have prosthetic joints?  17. NO - Is there any chance that you may be pregnant?      HPI:                                                      Brief HPI related to upcoming procedure:  Patient had a routine mammogram performed through the [facility] on 2/13/17. It was around this time she also noticed she was having a clear nipple discharge. The discharge started spontaneously, denied a feeling a palpable mass. She had a repeat mammogram and ultrasound done that shows 2 filling defects in a left breast lateral duct. 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 She's had no problems until recently with the clear nipple discharge. A subareolar duct excision has been recommended, patient has been educated that the papillomas are typically benign but are considered a pre-malignant condition so excision should be performed, due to her hx of papillomatosis she needs a biopsy. This will be performed with local anesthetic and IV sedation.    ASTHMA - Patient has a longstanding history of Asthma . Patient has been doing well overall noting no symptoms of wheezing, SOB, and continues on medication regimen consisting of albuterol, flonase, and claritin to control her allergies without adverse reactions or side effects. She has not been using her flovent for several months.                                                                                                                                                .    MEDICAL HISTORY:                                                      Patient Active Problem List    Diagnosis Date Noted      Gastroesophageal reflux disease, esophagitis presence not specified 2017     Priority: Medium     Mild persistent asthma without complication 2017     Priority: Medium     Menorrhagia 2017     Priority: Medium     Chronic rhinitis 2017     Priority: Medium     Dysmenorrhea 2017     Priority: Medium     Carcinoid tumor 2017     Priority: Medium      Past Medical History:   Diagnosis Date     Carcinoid tumor     right lung, upper and middle lobe resected     Fibroid (bleeding) (uterine)     chonic ongoing     Uncomplicated asthma      Past Surgical History:   Procedure Laterality Date     BIOPSY       GYN SURGERY  1995     section     THORACIC SURGERY      Carcinoid tumor removed      Current Outpatient Prescriptions   Medication Sig Dispense Refill     loratadine (CLARITIN) 10 MG tablet Take 10 mg by mouth daily       tranexamic acid (LYSTEDA) 650 MG tablet Take 1,300 mg by mouth as needed (Menorragea.)       albuterol (PROAIR HFA/PROVENTIL HFA/VENTOLIN HFA) 108 (90 BASE) MCG/ACT Inhaler Inhale 2 puffs into the lungs every 6 hours 3 Inhaler 3     fluticasone (FLONASE) 50 MCG/ACT spray Spray 2 sprays into both nostrils daily 3 Bottle 3     fluticasone (FLOVENT HFA) 220 MCG/ACT Inhaler Inhale 2 puffs into the lungs 2 times daily (Patient not taking: Reported on 2017) 3 Inhaler 3     OTC products: None, except as noted above    Allergies   Allergen Reactions     Seasonal Allergies Difficulty breathing     Apples [Apple] Rash     Kiwi Rash      Latex Allergy: NO    Social History   Substance Use Topics     Smoking status: Never Smoker     Smokeless tobacco: Not on file     Alcohol use Yes      Comment: 2-3 glasses of wine per week     History   Drug Use No       REVIEW OF SYSTEMS:                                                    C: NEGATIVE for fever, chills, change in weight  I: NEGATIVE for worrisome rashes, moles or lesions  E: NEGATIVE for vision changes or  "irritation  E/M: NEGATIVE for ear, mouth and throat problems  R: NEGATIVE for significant cough or SOB  B: NEGATIVE for tenderness, positive for discharge  CV: NEGATIVE for chest pain, palpitations or peripheral edema  GI: NEGATIVE for nausea, abdominal pain, or change in bowel habits, positive for heartburn  : NEGATIVE for frequency, dysuria, or hematuria  M: NEGATIVE for significant arthralgias or myalgia  N: NEGATIVE for weakness, dizziness or paresthesias  E: NEGATIVE for temperature intolerance, skin/hair changes  H: NEGATIVE for bleeding problems  P: NEGATIVE for changes in mood or affect    EXAM:                                                    /83 (BP Location: Right arm, Patient Position: Chair, Cuff Size: Adult Regular)  Pulse 70  Temp 98  F (36.7  C) (Oral)  Resp 16  Ht 5' 4.5\" (163.8 cm)  Wt 167 lb 1.6 oz (75.8 kg)  SpO2 96%  Breastfeeding? No  BMI 28.24 kg/m2    GENERAL APPEARANCE: healthy, alert and no distress     EYES: EOMI, PERRL     HENT: ear canals and TM's normal and nose and mouth without ulcers or lesions     NECK: no adenopathy, no asymmetry, masses, or scars and thyroid normal to palpation     RESP: lungs clear to auscultation - no rales, rhonchi or wheezes     CV: regular rates and rhythm, normal S1 S2, no S3 or S4 and no murmur, click or rub     ABDOMEN:  soft, nontender, no HSM or masses and bowel sounds normal     MS: extremities normal- no gross deformities noted, no evidence of inflammation in joints, FROM in all extremities.     SKIN: no suspicious lesions or rashes     NEURO: Normal strength and tone, sensory exam grossly normal, mentation intact and speech normal     PSYCH: mentation appears normal. and affect normal/bright     LYMPHATICS: No axillary, cervical, or supraclavicular nodes    DIAGNOSTICS:                                                      Results for orders placed or performed in visit on 06/23/17 (from the past 48 hour(s))   Hemoglobin (HGB) " ([facility])   Result Value Ref Range    Hemoglobin 11.0 (L) 11.7 - 15.7 g/dL     ACT Total Scores 4/10/2017 6/23/2017   ACT TOTAL SCORE (Goal Greater than or Equal to 20) 20 24   In the past 12 months, how many times did you visit the emergency room for your asthma without being admitted to the hospital? 0 0   In the past 12 months, how many times were you hospitalized overnight because of your asthma? 0 0       IMPRESSION:                                                    Reason for surgery/procedure: Left subareolar duct excision  Diagnosis/reason for consult: Preop    The proposed surgical procedure is considered LOW risk.    REVISED CARDIAC RISK INDEX  The patient has the following serious cardiovascular risks for perioperative complications such as (MI, PE, VFib and 3  AV Block):  No serious cardiac risks  INTERPRETATION: 0 risks: Class I (very low risk - 0.4% complication rate)    The patient has the following additional risks for perioperative complications:  No identified additional risks  Hx of asthma, well controlled      ICD-10-CM    1. Preop general physical exam Z01.818 Hemoglobin (HGB) ([facility])   2. Discharge from left nipple N64.52    3. Mild persistent asthma without complication J45.30    4. Menorrhagia with regular cycle N92.0    5. Gastroesophageal reflux disease, esophagitis presence not specified K21.9      Patient hgb 11, hx of menorrhagia with treatment of tranexamic acid as needed. Asthma stable and well controlled. Patient will return for spirometry after her biopsy. Patient with hx of GERD, will return as needed if no improvement with Zantac.     RECOMMENDATIONS:                                                      --Patient is to take all scheduled medications on the day of surgery.    APPROVAL GIVEN to proceed with proposed procedure, without further diagnostic evaluation     Signed Electronically by: Dianne Kuo NP    Copy of this evaluation report is provided to requesting  physician.    [facility] Preop Guidelines

## 2017-06-24 ASSESSMENT — ASTHMA QUESTIONNAIRES: ACT_TOTALSCORE: 24

## 2017-06-24 ASSESSMENT — ANXIETY QUESTIONNAIRES: GAD7 TOTAL SCORE: 0

## 2017-06-24 ASSESSMENT — PATIENT HEALTH QUESTIONNAIRE - PHQ9: SUM OF ALL RESPONSES TO PHQ QUESTIONS 1-9: 0

## 2017-06-28 ENCOUNTER — AMBULATORY - HEALTHEAST (OUTPATIENT)
Dept: SURGERY | Facility: CLINIC | Age: 52
End: 2017-06-28

## 2017-06-28 ENCOUNTER — RECORDS - HEALTHEAST (OUTPATIENT)
Dept: ADMINISTRATIVE | Facility: OTHER | Age: 52
End: 2017-06-28

## 2017-07-06 ENCOUNTER — AMBULATORY - HEALTHEAST (OUTPATIENT)
Dept: SURGERY | Facility: CLINIC | Age: 52
End: 2017-07-06

## 2017-07-06 DIAGNOSIS — R92.8 ABNORMAL MAMMOGRAM OF LEFT BREAST: ICD-10-CM

## 2018-01-21 ENCOUNTER — HEALTH MAINTENANCE LETTER (OUTPATIENT)
Age: 53
End: 2018-01-21

## 2018-04-20 DIAGNOSIS — J45.30 MILD PERSISTENT ASTHMA WITHOUT COMPLICATION: ICD-10-CM

## 2018-04-20 RX ORDER — ALBUTEROL SULFATE 90 UG/1
2 AEROSOL, METERED RESPIRATORY (INHALATION) EVERY 6 HOURS
Qty: 1 INHALER | Refills: 0 | Status: SHIPPED | OUTPATIENT
Start: 2018-04-20 | End: 2018-06-14

## 2018-04-20 NOTE — PROGRESS NOTES
Willing to give patient one month refill on albuterol inhaler with understanding that she will return to clinic after her upcoming travel for a routine health maintenance exam. She is agreeable to this. Pt states she takes 2 puffs of her inhaler each morning prior to exercising. Is not currently taking Flovent HFA, however has it on hand in the event that she feels the need to start controller therapy. Is taking Claritin 10 mg daily and uses the Flonase nasal spray when the Spring allergy season begins to affect her. Feels asthma is under good control and has no concerns.     Jinny Bradshaw, DNP, APRN, CNP

## 2018-06-01 LAB
HPV ABSTRACT: NORMAL
PAP-ABSTRACT: NORMAL

## 2018-06-14 ENCOUNTER — OFFICE VISIT (OUTPATIENT)
Dept: FAMILY MEDICINE | Facility: CLINIC | Age: 53
End: 2018-06-14
Payer: COMMERCIAL

## 2018-06-14 VITALS
TEMPERATURE: 97.6 F | DIASTOLIC BLOOD PRESSURE: 80 MMHG | HEIGHT: 64 IN | RESPIRATION RATE: 16 BRPM | WEIGHT: 166 LBS | HEART RATE: 84 BPM | SYSTOLIC BLOOD PRESSURE: 130 MMHG | BODY MASS INDEX: 28.34 KG/M2 | OXYGEN SATURATION: 97 %

## 2018-06-14 DIAGNOSIS — N92.4 EXCESSIVE BLEEDING IN PREMENOPAUSAL PERIOD: ICD-10-CM

## 2018-06-14 DIAGNOSIS — K21.9 GASTROESOPHAGEAL REFLUX DISEASE, ESOPHAGITIS PRESENCE NOT SPECIFIED: Primary | ICD-10-CM

## 2018-06-14 DIAGNOSIS — J45.20 MILD INTERMITTENT ASTHMA WITHOUT COMPLICATION: ICD-10-CM

## 2018-06-14 RX ORDER — ALBUTEROL SULFATE 90 UG/1
2 AEROSOL, METERED RESPIRATORY (INHALATION) EVERY 6 HOURS
Qty: 1 INHALER | Refills: 3 | Status: SHIPPED | OUTPATIENT
Start: 2018-06-14 | End: 2019-06-03

## 2018-06-14 RX ORDER — TRANEXAMIC ACID 650 MG/1
1300 TABLET ORAL DAILY PRN
Qty: 30 TABLET | Refills: 0 | Status: SHIPPED | OUTPATIENT
Start: 2018-06-14 | End: 2020-10-23

## 2018-06-14 ASSESSMENT — ANXIETY QUESTIONNAIRES

## 2018-06-14 ASSESSMENT — PATIENT HEALTH QUESTIONNAIRE - PHQ9: 5. POOR APPETITE OR OVEREATING: NOT AT ALL

## 2018-06-14 NOTE — MR AVS SNAPSHOT
After Visit Summary   6/14/2018    Susie Dupont    MRN: [id number]           Patient Information     Date Of Birth          1965        Visit Information        Provider Department      6/14/2018 3:30 PM Jinny Bradshaw APRN CNP [facility]        Today's Diagnoses     Gastroesophageal reflux disease, esophagitis presence not specified    -  1    Mild intermittent asthma without complication        Excessive bleeding in premenopausal period          Care Instructions      Perimenopause  Menopause is when you stop having periods for good. For many women, this happens around age 50. The time of change before this is called perimenopause. It may start in your late 30s or 40s. It can last for months or years. During this time, your body makes lower levels of female hormones. This causes certain changes in your body. You may already have begun to feel the effects of these changes. By taking steps to relieve symptoms, you can still feel good.    The menstrual cycle  Hormones are chemicals that have specific jobs in the body. A menstrual cycle is caused by changes in the levels of 2 female hormones. These hormones are estrogen and progesterone. They are made by the ovaries. In a normal cycle, estrogen creates a lining in the uterus to allow for pregnancy. The ovary then releases an egg. This is called ovulation. Progesterone levels start to go up. If the egg is not fertilized, progesterone levels go down. This causes the lining in the uterus to be shed. This is the bleeding that is your period.  Changes in hormones  As a woman ages, her ovaries begin to make hormones less regularly. In some months, there may not be ovulation. Without ovulation, progesterone isn't secreted so a normal period doesn't occur. The menstrual cycle will be harder to predict. Over time, the ovaries stop working. This can cause symptoms. Some women who have had their uterus taken out (hysterectomy) but still have  ovaries may still have symptoms. When estrogen levels reach their lowest point, periods will stop completely. This is menopause.  Symptoms of perimenopause  The change in hormones can cause physical symptoms. It can also cause emotional symptoms. These may include:    Periods that come more or less often    Periods that are lighter or heavier than you re used to    Hot flashes, night sweats, or trouble sleeping    Vaginal dryness, which may make sex painful    Mood swings or fatigue    Palpitations    Sleep disturbances    Urinary symptoms including frequency and incontinence  Medicines that may help  Some medicines can help ease the effects of perimenopause. These include:    Low-dose birth control pills. These often contain both estrogen and progesterone. They can help regulate your periods.    Hormone therapy (HT). This replaces some of the hormones your body has stopped making.    Antidepressants. These help balance brain chemicals that may decrease during this time. Signs of depression can include often feeling sad or hopeless. If you feel this way, be sure to talk to your healthcare provider.    Gabapentin. This is a medicine also used to treat seizures. This controls hot flashes and night sweats in some women.    Clonidine. This medicine may control hot flashes and night sweats.  Date Last Reviewed: 11/1/2017 2000-2017 [employer]. [address] [place] [id number]. All rights reserved. This information is not intended as a substitute for professional medical care. Always follow your healthcare professional's instructions.        Patient Education    Ranitidine Hydrochloride Effervescent tablet    Ranitidine Hydrochloride Oral capsule    Ranitidine Hydrochloride Oral solution    Ranitidine Hydrochloride Oral tablet    Ranitidine Hydrochloride Solution for injection  Ranitidine Hydrochloride Oral tablet  What is this medicine?  RANITIDINE (darlin sal) is a type of antihistamine  that blocks the release of stomach acid. It is used to treat stomach or intestinal ulcers. It can relieve ulcer pain and discomfort, and the heartburn from acid reflux.  This medicine may be used for other purposes; ask your health care provider or pharmacist if you have questions.  What should I tell my health care provider before I take this medicine?  They need to know if you have any of these conditions:    kidney disease    liver disease    porphyria    an unusual or allergic reaction to ranitidine, other medicines, foods, dyes, or preservatives    pregnant or trying to get pregnant    breast-feeding  How should I use this medicine?  Take this medicine by mouth with a glass of water. Follow the directions on the prescription label. If you only take this medicine once a day, take it at bedtime. Take your medicine at regular intervals. Do not take your medicine more often than directed. Do not stop taking except on your doctor's advice.  Talk to your pediatrician regarding the use of this medicine in children. Special care may be needed.  Overdosage: If you think you have taken too much of this medicine contact a poison control center or emergency room at once.  NOTE: This medicine is only for you. Do not share this medicine with others.  What if I miss a dose?  If you miss a dose, take it as soon as you can. If it is almost time for your next dose, take only that dose. Do not take double or extra doses.  What may interact with this medicine?    atazanavir    delavirdine    gefitinib    glipizide    ketoconazole    midazolam    procainamide    propantheline    triazolam    warfarin  This list may not describe all possible interactions. Give your health care provider a list of all the medicines, herbs, non-prescription drugs, or dietary supplements you use. Also tell them if you smoke, drink alcohol, or use illegal drugs. Some items may interact with your medicine.  What should I watch for while using this  medicine?  Tell your doctor or health care professional if your condition does not start to get better or gets worse. You may need to take this medicine for several days as prescribed before your symptoms get better. Finish the full course of tablets prescribed, even if you feel better.  Do not smoke cigarettes or drink alcohol. These increase irritation in your stomach and can lengthen the time it will take for ulcers to heal. Cigarettes and alcohol can also make acid reflux or heartburn worse.  If you get black, tarry stools or vomit up what looks like coffee grounds, call your doctor or health care professional at once. You may have a bleeding ulcer.  What side effects may I notice from receiving this medicine?  Side effects that you should report to your doctor or health care professional as soon as possible:    agitation, nervousness, depression, hallucinations    allergic reactions like skin rash, itching or hives, swelling of the face, lips, or tongue    breast enlargement in both males and females    breathing problems    redness, blistering, peeling or loosening of the skin, including inside the mouth    unusual bleeding or bruising    unusually weak or tired    vomiting    yellowing of the skin or eyes  Side effects that usually do not require medical attention (report to your doctor or health care professional if they continue or are bothersome):    constipation or diarrhea    dizziness    headache    nausea  This list may not describe all possible side effects. Call your doctor for medical advice about side effects. You may report side effects to FDA at 9-178-FDA-3921.  Where should I keep my medicine?  Keep out of the reach of children.  Store at room temperature between 15 and 30 degrees C (59 and 86 degrees F). Protect from light and moisture. Keep container tightly closed. Throw away any unused medicine after the expiration date.  NOTE:This sheet is a summary. It may not cover all possible  information. If you have questions about this medicine, talk to your doctor, pharmacist, or health care provider. Copyright  2016 [employer]      Omeprazole tablets (OTC)  Brand Name: Prilosec OTC  What is this medicine?  OMEPRAZOLE (oh ME pray zol) prevents the production of acid in the stomach. It is used to treat the symptoms of heartburn. You can buy this medicine without a prescription. This product is not for long-term use, unless otherwise directed by your doctor or health care professional.  How should I use this medicine?  Take this medicine by mouth. Follow the directions on the product label. If you are taking this medicine without a prescription, take one tablet every day. Do not use for longer than 14 days or repeat a course of treatment more often than every 4 months unless directed by a doctor or healthcare professional. Take your dose at regular intervals every 24 hours. Swallow the tablet whole with a drink of water. Do not crush, break or chew. This medicine works best if taken on an empty stomach 30 minutes before breakfast. If you are using this medicine with the prescription of your doctor or healthcare professional, follow the directions you were given. Do not take your medicine more often than directed.  Talk to your pediatrician regarding the use of this medicine in children. Special care may be needed.  What side effects may I notice from receiving this medicine?  Side effects that you should report to your doctor or health care professional as soon as possible:    allergic reactions like skin rash, itching or hives, swelling of the face, lips, or tongue    bone, muscle or joint pain    breathing problems    chest pain or chest tightness    dark yellow or brown urine    diarrhea    dizziness    fast, irregular heartbeat    feeling faint or lightheaded    fever or sore throat    muscle spasm    palpitations    rash on cheeks or arms that gets worse in the sun    redness, blistering,  peeling or loosening of the skin, including inside the mouth    seizures    tremors    unusual bleeding or bruising    unusually weak or tired    yellowing of the eyes or skin  Side effects that usually do not require medical attention (report to your doctor or health care professional if they continue or are bothersome):    constipation    dry mouth    headache    loose stools    nausea  What may interact with this medicine?  Do not take this medicine with any of the following medications:    atazanavir    clopidogrel    nelfinavir  This medicine may also interact with the following medications:    ampicillin    certain medicines for anxiety or sleep    certain medicines that treat or prevent blood clots like warfarin    cyclosporine    diazepam    digoxin    disulfiram    iron salts    methotrexate    mycophenolate mofetil    phenytoin    prescription medicine for fungal or yeast infection like itraconazole, ketoconazole, voriconazole    saquinavir    tacrolimus  What if I miss a dose?  If you miss a dose, take it as soon as you can. If it is almost time for your next dose, take only that dose. Do not take double or extra doses.  Where should I keep my medicine?  Keep out of the reach of children.  Store at room temperature between 20 and 25 degrees C (68 and 77 degrees F). Protect from light and moisture. Throw away any unused medicine after the expiration date.  What should I tell my health care provider before I take this medicine?  They need to know if you have any of these conditions:    black or bloody stools    chest pain    difficulty swallowing    have had heartburn for over 3 months    have heartburn with dizziness, lightheadedness or sweating    liver disease    lupus    stomach pain    unexplained weight loss    vomiting with blood    wheezing    an unusual or allergic reaction to omeprazole, other medicines, foods, dyes, or preservatives    pregnant or trying to get pregnant    breast-feeding  What  should I watch for while using this medicine?  It can take several days before your heartburn gets better. Check with your doctor or health care professional if your condition does not start to get better, or if it gets worse.  Do not treat diarrhea with over the counter products. Contact your doctor if you have diarrhea that lasts more than 2 days or if it is severe and watery.  Do not treat yourself for heartburn with this medicine for more than 14 days in a row. You should only use this medicine for a 2-week treatment period once every 4 months. If your symptoms return shortly after your therapy is complete, or within the 4 month time frame, call your doctor or health care professional.  NOTE:This sheet is a summary. It may not cover all possible information. If you have questions about this medicine, talk to your doctor, pharmacist, or health care provider. Copyright  2018 [employer]         GERD (Adult)    The esophagus is a tube that carries food from the mouth to the stomach. A valve at the lower end of the esophagus prevents stomach acid from flowing upward. When this valve doesn't work properly, stomach contents may repeatedly flow back up (reflux) into the esophagus. This is called gastroesophageal reflux disease (GERD). GERD can irritate the esophagus. It can cause problems with swallowing or breathing. In severe cases, GERD can cause recurrent pneumonia or other serious problems.  Symptoms of reflux include burning, pressure or sharp pain in the upper abdomen or mid to lower chest. The pain can spread to the neck, back, or shoulder. There may be belching, an acid taste in the back of the throat, chronic cough, or sore throat or hoarseness. GERD symptoms often occur during the day after a big meal. They can also occur at night when lying down.   Home care  Lifestyle changes can help reduce symptoms. If needed, medicines may be prescribed. Symptoms often improve with treatment, but if treatment is stopped,  "the symptoms often return after a few months. So most persons with GERD will need to continue treatment.  Lifestyle changes    Limit or avoid fatty, fried, and spicy foods, as well as coffee, chocolate, mint, and foods with high acid content such as tomatoes and citrus fruit and juices (orange, grapefruit, lemon).    Don t eat large meals, especially at night. Frequent, smaller meals are best. Do not lie down right after eating. And don t eat anything 3 hours before going to bed.    Avoid drinking alcohol and smoking. As much as possible, stay away from second hand smoke.    If you are overweight, losing weight will reduce symptoms.     Avoid wearing tight clothing around your stomach area.    If your symptoms occur during sleep, use a foam wedge to elevate your upper body (not just your head.) Or, place 4\" blocks under the head of your bed.  Medicines  If needed, medicines can help relieve the symptoms of GERD and prevent damage to the esophagus. Discuss a medicine plan with your healthcare provider. This may include one or more of the following medicines:    Antacids to help neutralize the normal acids in your stomach.    Acid blockers (H2 blockers) to decrease acid production.    Acid inhibitors (PPIs) to decrease acid production in a different way than the blockers. They may work better, but can take a little longer to take effect.  Take an antacid 30-60 minutes after eating and at bedtime, but not at the same time as an acid blocker.  Try not to take medicines such as ibuprofen and aspirin. If you are taking aspirin for your heart or other medical reasons, talk to your healthcare provider about stopping it.  Follow-up care  Follow up with your healthcare provider or as advised by our staff.  When to seek medical advice  Call your healthcare provider if any of the following occur:    Stomach pain gets worse or moves to the lower right abdomen (appendix area)    Chest pain appears or gets worse, or spreads to the " "back, neck, shoulder, or arm    Frequent vomiting (can t keep down liquids)    Blood in the stool or vomit (red or black in color)    Feeling weak or dizzy    Fever of 100.4 F (38 C) or higher, or as directed by your healthcare provider  Date Last Reviewed: 2015-2017 [employer]. [address] [place] [address]. All rights reserved. This information is not intended as a substitute for professional medical care. Always follow your healthcare professional's instructions.                      Follow-ups after your visit        Who to contact     Please call your clinic at [phone] to:    Ask questions about your health    Make or cancel appointments    Discuss your medicines    Learn about your test results    Speak to your doctor            Additional Information About Your Visit        [email or web address] Information     [employer] is an electronic gateway that provides easy, online access to your medical records. With [employer], you can request a clinic appointment, read your test results, renew a prescription or communicate with your care team.     To sign up for [employer] visit the website at [email or web address]   You will be asked to enter the access code listed below, as well as some personal information. Please follow the directions to create your username and password.     Your access code is: [email or web address]  Expires: 2018  4:15 PM     Your access code will  in 90 days. If you need help or a new code, please contact your [facility] or call [phone] for assistance.        Care EveryWhere ID     This is your Care EveryWhere ID. This could be used by other organizations to access your [facility] medical records  [id number]        Your Vitals Were     Pulse Temperature Respirations Height Pulse Oximetry Breastfeeding?    84 97.6  F (36.4  C) (Oral) 16 5' 4\" (162.6 cm) 97% No    BMI (Body Mass Index)                   28.49 kg/m2            " Blood Pressure from Last 3 Encounters:   06/14/18 130/80   06/23/17 133/83   04/10/17 (!) 149/92    Weight from Last 3 Encounters:   06/14/18 166 lb (75.3 kg)   06/23/17 167 lb 1.6 oz (75.8 kg)   04/10/17 161 lb (73 kg)              Today, you had the following     No orders found for display         Today's Medication Changes          These changes are accurate as of 6/14/18  4:15 PM.  If you have any questions, ask your nurse or doctor.               Start taking these medicines.        Dose/Directions    ranitidine 150 MG tablet   Commonly known as:  ZANTAC   Used for:  Gastroesophageal reflux disease, esophagitis presence not specified   Started by:  Jinny Bradshaw APRN CNP        Dose:  150 mg   Take 1 tablet (150 mg) by mouth 2 times daily   Quantity:  60 tablet   Refills:  1         These medicines have changed or have updated prescriptions.        Dose/Directions    tranexamic acid 650 MG tablet   Commonly known as:  LYSTEDA   This may have changed:    - when to take this  - reasons to take this   Used for:  Excessive bleeding in premenopausal period   Changed by:  Jinny Bradshaw APRN CNP        Dose:  1300 mg   Take 2 tablets (1,300 mg) by mouth daily as needed (Menorrhagia)   Quantity:  30 tablet   Refills:  0            Where to get your medicines      These medications were sent to [facility]/pharmacy #[id number] [address] [address]     Phone:  [phone]     albuterol 108 (90 Base) MCG/ACT Inhaler    ranitidine 150 MG tablet    tranexamic acid 650 MG tablet                Primary Care Provider    None Specified       No primary provider on file.        Equal Access to Services     [facility]: Edgar Colunga, wanicolasda luqadaha, qaybta kaalmahuey cintron . So [facility] [phone].    ATENCIÓN: Si habla español, tiene a conroy disposición servicios gratuitos de asistencia lingüística. Llame al [phone].    We  comply with applicable federal civil rights laws and [place] laws. We do not discriminate on the basis of race, color, national origin, age, disability, sex, sexual orientation, or gender identity.            Thank you!     Thank you for choosing [facility]  for your care. Our goal is always to provide you with excellent care. Hearing back from our patients is one way we can continue to improve our services. Please take a few minutes to complete the written survey that you may receive in the mail after your visit with us. Thank you!             Your Updated Medication List - Protect others around you: Learn how to safely use, store and throw away your medicines at www.disposemymeds.org.          This list is accurate as of 6/14/18  4:15 PM.  Always use your most recent med list.                   Brand Name Dispense Instructions for use Diagnosis    albuterol 108 (90 Base) MCG/ACT Inhaler    PROAIR HFA/PROVENTIL HFA/VENTOLIN HFA    1 Inhaler    Inhale 2 puffs into the lungs every 6 hours    Mild intermittent asthma without complication       CLARITIN 10 MG tablet   Generic drug:  loratadine      Take 10 mg by mouth daily        fluticasone 50 MCG/ACT spray    FLONASE    3 Bottle    Spray 2 sprays into both nostrils daily    Chronic rhinitis       ranitidine 150 MG tablet    ZANTAC    60 tablet    Take 1 tablet (150 mg) by mouth 2 times daily    Gastroesophageal reflux disease, esophagitis presence not specified       tranexamic acid 650 MG tablet    LYSTEDA    30 tablet    Take 2 tablets (1,300 mg) by mouth daily as needed (Menorrhagia)    Excessive bleeding in premenopausal period

## 2018-06-14 NOTE — PATIENT INSTRUCTIONS
Perimenopause  Menopause is when you stop having periods for good. For many women, this happens around age 50. The time of change before this is called perimenopause. It may start in your late 30s or 40s. It can last for months or years. During this time, your body makes lower levels of female hormones. This causes certain changes in your body. You may already have begun to feel the effects of these changes. By taking steps to relieve symptoms, you can still feel good.    The menstrual cycle  Hormones are chemicals that have specific jobs in the body. A menstrual cycle is caused by changes in the levels of 2 female hormones. These hormones are estrogen and progesterone. They are made by the ovaries. In a normal cycle, estrogen creates a lining in the uterus to allow for pregnancy. The ovary then releases an egg. This is called ovulation. Progesterone levels start to go up. If the egg is not fertilized, progesterone levels go down. This causes the lining in the uterus to be shed. This is the bleeding that is your period.  Changes in hormones  As a woman ages, her ovaries begin to make hormones less regularly. In some months, there may not be ovulation. Without ovulation, progesterone isn't secreted so a normal period doesn't occur. The menstrual cycle will be harder to predict. Over time, the ovaries stop working. This can cause symptoms. Some women who have had their uterus taken out (hysterectomy) but still have ovaries may still have symptoms. When estrogen levels reach their lowest point, periods will stop completely. This is menopause.  Symptoms of perimenopause  The change in hormones can cause physical symptoms. It can also cause emotional symptoms. These may include:    Periods that come more or less often    Periods that are lighter or heavier than you re used to    Hot flashes, night sweats, or trouble sleeping    Vaginal dryness, which may make sex painful    Mood swings or fatigue    Palpitations     Sleep disturbances    Urinary symptoms including frequency and incontinence  Medicines that may help  Some medicines can help ease the effects of perimenopause. These include:    Low-dose birth control pills. These often contain both estrogen and progesterone. They can help regulate your periods.    Hormone therapy (HT). This replaces some of the hormones your body has stopped making.    Antidepressants. These help balance brain chemicals that may decrease during this time. Signs of depression can include often feeling sad or hopeless. If you feel this way, be sure to talk to your healthcare provider.    Gabapentin. This is a medicine also used to treat seizures. This controls hot flashes and night sweats in some women.    Clonidine. This medicine may control hot flashes and night sweats.  Date Last Reviewed: 11/1/2017 2000-2017 [employer]. [address] [place] [address], [place] [address]. All rights reserved. This information is not intended as a substitute for professional medical care. Always follow your healthcare professional's instructions.        Patient Education    Ranitidine Hydrochloride Effervescent tablet    Ranitidine Hydrochloride Oral capsule    Ranitidine Hydrochloride Oral solution    Ranitidine Hydrochloride Oral tablet    Ranitidine Hydrochloride Solution for injection  Ranitidine Hydrochloride Oral tablet  What is this medicine?  RANITIDINE (darlin sal) is a type of antihistamine that blocks the release of stomach acid. It is used to treat stomach or intestinal ulcers. It can relieve ulcer pain and discomfort, and the heartburn from acid reflux.  This medicine may be used for other purposes; ask your health care provider or pharmacist if you have questions.  What should I tell my health care provider before I take this medicine?  They need to know if you have any of these conditions:    kidney disease    liver disease    porphyria    an unusual or allergic reaction to ranitidine,  other medicines, foods, dyes, or preservatives    pregnant or trying to get pregnant    breast-feeding  How should I use this medicine?  Take this medicine by mouth with a glass of water. Follow the directions on the prescription label. If you only take this medicine once a day, take it at bedtime. Take your medicine at regular intervals. Do not take your medicine more often than directed. Do not stop taking except on your doctor's advice.  Talk to your pediatrician regarding the use of this medicine in children. Special care may be needed.  Overdosage: If you think you have taken too much of this medicine contact a poison control center or emergency room at once.  NOTE: This medicine is only for you. Do not share this medicine with others.  What if I miss a dose?  If you miss a dose, take it as soon as you can. If it is almost time for your next dose, take only that dose. Do not take double or extra doses.  What may interact with this medicine?    atazanavir    delavirdine    gefitinib    glipizide    ketoconazole    midazolam    procainamide    propantheline    triazolam    warfarin  This list may not describe all possible interactions. Give your health care provider a list of all the medicines, herbs, non-prescription drugs, or dietary supplements you use. Also tell them if you smoke, drink alcohol, or use illegal drugs. Some items may interact with your medicine.  What should I watch for while using this medicine?  Tell your doctor or health care professional if your condition does not start to get better or gets worse. You may need to take this medicine for several days as prescribed before your symptoms get better. Finish the full course of tablets prescribed, even if you feel better.  Do not smoke cigarettes or drink alcohol. These increase irritation in your stomach and can lengthen the time it will take for ulcers to heal. Cigarettes and alcohol can also make acid reflux or heartburn worse.  If you get  black, tarry stools or vomit up what looks like coffee grounds, call your doctor or health care professional at once. You may have a bleeding ulcer.  What side effects may I notice from receiving this medicine?  Side effects that you should report to your doctor or health care professional as soon as possible:    agitation, nervousness, depression, hallucinations    allergic reactions like skin rash, itching or hives, swelling of the face, lips, or tongue    breast enlargement in both males and females    breathing problems    redness, blistering, peeling or loosening of the skin, including inside the mouth    unusual bleeding or bruising    unusually weak or tired    vomiting    yellowing of the skin or eyes  Side effects that usually do not require medical attention (report to your doctor or health care professional if they continue or are bothersome):    constipation or diarrhea    dizziness    headache    nausea  This list may not describe all possible side effects. Call your doctor for medical advice about side effects. You may report side effects to FDA at 2-934-FDA-8474.  Where should I keep my medicine?  Keep out of the reach of children.  Store at room temperature between 15 and 30 degrees C (59 and 86 degrees F). Protect from light and moisture. Keep container tightly closed. Throw away any unused medicine after the expiration date.  NOTE:This sheet is a summary. It may not cover all possible information. If you have questions about this medicine, talk to your doctor, pharmacist, or health care provider. Copyright  2016 [employer]      Omeprazole tablets (OTC)  Brand Name: Prilosec OTC  What is this medicine?  OMEPRAZOLE (oh ME pray zol) prevents the production of acid in the stomach. It is used to treat the symptoms of heartburn. You can buy this medicine without a prescription. This product is not for long-term use, unless otherwise directed by your doctor or health care professional.  How should I  use this medicine?  Take this medicine by mouth. Follow the directions on the product label. If you are taking this medicine without a prescription, take one tablet every day. Do not use for longer than 14 days or repeat a course of treatment more often than every 4 months unless directed by a doctor or healthcare professional. Take your dose at regular intervals every 24 hours. Swallow the tablet whole with a drink of water. Do not crush, break or chew. This medicine works best if taken on an empty stomach 30 minutes before breakfast. If you are using this medicine with the prescription of your doctor or healthcare professional, follow the directions you were given. Do not take your medicine more often than directed.  Talk to your pediatrician regarding the use of this medicine in children. Special care may be needed.  What side effects may I notice from receiving this medicine?  Side effects that you should report to your doctor or health care professional as soon as possible:    allergic reactions like skin rash, itching or hives, swelling of the face, lips, or tongue    bone, muscle or joint pain    breathing problems    chest pain or chest tightness    dark yellow or brown urine    diarrhea    dizziness    fast, irregular heartbeat    feeling faint or lightheaded    fever or sore throat    muscle spasm    palpitations    rash on cheeks or arms that gets worse in the sun    redness, blistering, peeling or loosening of the skin, including inside the mouth    seizures    tremors    unusual bleeding or bruising    unusually weak or tired    yellowing of the eyes or skin  Side effects that usually do not require medical attention (report to your doctor or health care professional if they continue or are bothersome):    constipation    dry mouth    headache    loose stools    nausea  What may interact with this medicine?  Do not take this medicine with any of the following medications:    atazanavir    clopidogrel     nelfinavir  This medicine may also interact with the following medications:    ampicillin    certain medicines for anxiety or sleep    certain medicines that treat or prevent blood clots like warfarin    cyclosporine    diazepam    digoxin    disulfiram    iron salts    methotrexate    mycophenolate mofetil    phenytoin    prescription medicine for fungal or yeast infection like itraconazole, ketoconazole, voriconazole    saquinavir    tacrolimus  What if I miss a dose?  If you miss a dose, take it as soon as you can. If it is almost time for your next dose, take only that dose. Do not take double or extra doses.  Where should I keep my medicine?  Keep out of the reach of children.  Store at room temperature between 20 and 25 degrees C (68 and 77 degrees F). Protect from light and moisture. Throw away any unused medicine after the expiration date.  What should I tell my health care provider before I take this medicine?  They need to know if you have any of these conditions:    black or bloody stools    chest pain    difficulty swallowing    have had heartburn for over 3 months    have heartburn with dizziness, lightheadedness or sweating    liver disease    lupus    stomach pain    unexplained weight loss    vomiting with blood    wheezing    an unusual or allergic reaction to omeprazole, other medicines, foods, dyes, or preservatives    pregnant or trying to get pregnant    breast-feeding  What should I watch for while using this medicine?  It can take several days before your heartburn gets better. Check with your doctor or health care professional if your condition does not start to get better, or if it gets worse.  Do not treat diarrhea with over the counter products. Contact your doctor if you have diarrhea that lasts more than 2 days or if it is severe and watery.  Do not treat yourself for heartburn with this medicine for more than 14 days in a row. You should only use this medicine for a 2-week treatment  period once every 4 months. If your symptoms return shortly after your therapy is complete, or within the 4 month time frame, call your doctor or health care professional.  NOTE:This sheet is a summary. It may not cover all possible information. If you have questions about this medicine, talk to your doctor, pharmacist, or health care provider. Copyright  2018 [employer]         GERD (Adult)    The esophagus is a tube that carries food from the mouth to the stomach. A valve at the lower end of the esophagus prevents stomach acid from flowing upward. When this valve doesn't work properly, stomach contents may repeatedly flow back up (reflux) into the esophagus. This is called gastroesophageal reflux disease (GERD). GERD can irritate the esophagus. It can cause problems with swallowing or breathing. In severe cases, GERD can cause recurrent pneumonia or other serious problems.  Symptoms of reflux include burning, pressure or sharp pain in the upper abdomen or mid to lower chest. The pain can spread to the neck, back, or shoulder. There may be belching, an acid taste in the back of the throat, chronic cough, or sore throat or hoarseness. GERD symptoms often occur during the day after a big meal. They can also occur at night when lying down.   Home care  Lifestyle changes can help reduce symptoms. If needed, medicines may be prescribed. Symptoms often improve with treatment, but if treatment is stopped, the symptoms often return after a few months. So most persons with GERD will need to continue treatment.  Lifestyle changes    Limit or avoid fatty, fried, and spicy foods, as well as coffee, chocolate, mint, and foods with high acid content such as tomatoes and citrus fruit and juices (orange, grapefruit, lemon).    Don t eat large meals, especially at night. Frequent, smaller meals are best. Do not lie down right after eating. And don t eat anything 3 hours before going to bed.    Avoid drinking alcohol and smoking. As  "much as possible, stay away from second hand smoke.    If you are overweight, losing weight will reduce symptoms.     Avoid wearing tight clothing around your stomach area.    If your symptoms occur during sleep, use a foam wedge to elevate your upper body (not just your head.) Or, place 4\" blocks under the head of your bed.  Medicines  If needed, medicines can help relieve the symptoms of GERD and prevent damage to the esophagus. Discuss a medicine plan with your healthcare provider. This may include one or more of the following medicines:    Antacids to help neutralize the normal acids in your stomach.    Acid blockers (H2 blockers) to decrease acid production.    Acid inhibitors (PPIs) to decrease acid production in a different way than the blockers. They may work better, but can take a little longer to take effect.  Take an antacid 30-60 minutes after eating and at bedtime, but not at the same time as an acid blocker.  Try not to take medicines such as ibuprofen and aspirin. If you are taking aspirin for your heart or other medical reasons, talk to your healthcare provider about stopping it.  Follow-up care  Follow up with your healthcare provider or as advised by our staff.  When to seek medical advice  Call your healthcare provider if any of the following occur:    Stomach pain gets worse or moves to the lower right abdomen (appendix area)    Chest pain appears or gets worse, or spreads to the back, neck, shoulder, or arm    Frequent vomiting (can t keep down liquids)    Blood in the stool or vomit (red or black in color)    Feeling weak or dizzy    Fever of 100.4 F (38 C) or higher, or as directed by your healthcare provider  Date Last Reviewed: 6/23/2015 2000-2017 [employer]. [address] [place] [address], [place] [address]. All rights reserved. This information is not intended as a substitute for professional medical care. Always follow your healthcare professional's instructions.              "

## 2018-06-14 NOTE — NURSING NOTE
"52 year old  Chief Complaint   Patient presents with     Refill Request     Discuss GERD and refill albuterol, tranexamic acid.        Blood pressure 130/80, pulse 84, temperature 97.6  F (36.4  C), temperature source Oral, resp. rate 16, height 5' 4\" (162.6 cm), weight 166 lb (75.3 kg), SpO2 97 %, not currently breastfeeding. Body mass index is 28.49 kg/(m^2).  BP completed using cuff size:    Patient Active Problem List   Diagnosis     Mild persistent asthma without complication     Menorrhagia     Chronic rhinitis     Dysmenorrhea     Carcinoid tumor     Gastroesophageal reflux disease, esophagitis presence not specified       Wt Readings from Last 2 Encounters:   06/14/18 166 lb (75.3 kg)   06/23/17 167 lb 1.6 oz (75.8 kg)     BP Readings from Last 3 Encounters:   06/14/18 130/80   06/23/17 133/83   04/10/17 (!) 149/92       Allergies   Allergen Reactions     Chlorpheniramine-Phenylephrine Other (See Comments)     Seasonal Allergies Difficulty breathing     Apples [Apple] Rash     Kiwi Rash       Current Outpatient Prescriptions   Medication     albuterol (PROAIR HFA/PROVENTIL HFA/VENTOLIN HFA) 108 (90 Base) MCG/ACT Inhaler     fluticasone (FLONASE) 50 MCG/ACT spray     tranexamic acid (LYSTEDA) 650 MG tablet     loratadine (CLARITIN) 10 MG tablet     No current facility-administered medications for this visit.        Social History   Substance Use Topics     Smoking status: Never Smoker     Smokeless tobacco: Never Used     Alcohol use Yes      Comment: 2-3 glasses of wine per week       Honoring Choices - Health Care Directive Guide offered to patient at time of visit.    Health Maintenance Due   Topic Date Due     TETANUS IMMUNIZATION (SYSTEM ASSIGNED)  08/08/1984     HIV SCREEN (SYSTEM ASSIGNED)  08/08/1984     PAP SCREENING Q3 YR (SYSTEM ASSIGNED)  08/08/1987     LIPID SCREEN Q5 YR FEMALE (SYSTEM ASSIGNED)  08/08/2011     COLON CANCER SCREEN (SYSTEM ASSIGNED)  08/08/2016     ASTHMA CONTROL TEST Q6 MOS  " 12/23/2017     ASTHMA ACTION PLAN Q1 YR  04/11/2018         There is no immunization history on file for this patient.    No results found for: PAP      No lab results found.    PHQ-2 ( 1999 Pfizer) 6/14/2018 6/23/2017   Q1: Little interest or pleasure in doing things 0 0   Q2: Feeling down, depressed or hopeless 0 0   PHQ-2 Score 0 0       PHQ-9 SCORE 4/10/2017 6/23/2017 6/14/2018   Total Score 0 0 0       CLAYTON-7 SCORE 4/10/2017 6/23/2017 6/14/2018   Total Score 0 0 0       ACT Total Scores 4/10/2017 6/23/2017   ACT TOTAL SCORE (Goal Greater than or Equal to 20) 20 24   In the past 12 months, how many times did you visit the emergency room for your asthma without being admitted to the hospital? 0 0   In the past 12 months, how many times were you hospitalized overnight because of your asthma? 0 0       Elayne Argueta CMA  June 14, 2018 3:36 PM

## 2018-06-14 NOTE — PROGRESS NOTES
HPI:       Susie Dupont is a 52 year old who presents for evaluation of the following concerns.      Asthma/Allergies:  Susie reports a hx of asthma and allergies. She has been taking OTC Claritin x 1 year and has been using flonase intranasal spray for management, which has been working very well.  At one point, she had a prescription for Flovent, however she did not find it helpful and has since discontinued it. Susie uses her URIEL once daily in the morning 5-7 minutes prior to exercise. She has made it part of her daily routine, otherwise she will forget. Has found exercise to trigger asthmatic symptoms. Susie is requesting a refill on her albuterol inhaler today.    GERD:  Susie reports a hx of allergy to raw apples. Certain foods bother her as well. She has historically been very careful about her diet. Over the past 6-8 months (Since 12/2017), she has developed s/sx consistent with GERD and slowed digestion. Symptoms include bloating, abdominal distention, and increased flatus. Feels her stomach does not completely empty. Feels she has been putting on weight despite regular exercise habits.     Susie has taken ranitidine a handful of times, at most will take for four consecutive days. Has not tried PPI previously but is interested in this. She avoids eating later at night. Began to associate red wine with increase in symptoms so starting avoiding that and would only drink white wine. However, she has not noticed white wine to exacerbate symptoms as well. Eats  food regularly as part of her culture. Describes a time recently where she experienced burning in her mouth following intake of  food. She tried different rinses and toothpaste to resolve this sensation and eventually saw oral surgery whom suspected symptoms were consistent with GERD. Mouth symptoms have resolved since that time.     Susie travels for work and has noticed that it takes longer for her GI system to normalize after  "travel. She described an instance recently in which she returned from travel and experienced a sensation of reflux to left upper quadrant of abdomen.     Susie has a bowel movement daily, sometimes more than once daily. Denies constipation, abdominal pain, and blood in the stool. If anything, stools have been looser. Reports family history of IBS in her mother and colitis.      Menorrhagia:  Susie is requesting a refill on tranexamic acid 650 mg tablet, which she takes for management of menorrhagia. Takes as needed and uses very sparingly. Last prescription was for 30 tablets and lasted her nearly one year. Historically prescribed by OB/GYN.    Past Medical History:   Diagnosis Date     Carcinoid tumor     right lung, upper and middle lobe resected     Fibroid (bleeding) (uterine)     chonic ongoing     Uncomplicated asthma      Current Outpatient Prescriptions   Medication     albuterol (PROAIR HFA/PROVENTIL HFA/VENTOLIN HFA) 108 (90 Base) MCG/ACT Inhaler     fluticasone (FLONASE) 50 MCG/ACT spray     ranitidine (ZANTAC) 150 MG tablet     tranexamic acid (LYSTEDA) 650 MG tablet     loratadine (CLARITIN) 10 MG tablet     No current facility-administered medications for this visit.         Allergies   Allergen Reactions     Chlorpheniramine-Phenylephrine Other (See Comments)     Seasonal Allergies Difficulty breathing     Apples [Apple] Rash     Kiwi Rash       Problem, Medication and Allergy Lists were reviewed and are current.  Patient is an established patient of this clinic.         Review of Systems:   Review of Systems      ROS: 10 point ROS neg other than the symptoms noted above in the HPI.         Physical Exam:   /80 (BP Location: Left arm, Patient Position: Chair, Cuff Size: Adult Regular)  Pulse 84  Temp 97.6  F (36.4  C) (Oral)  Resp 16  Ht 5' 4\" (162.6 cm)  Wt 166 lb (75.3 kg)  SpO2 97%  Breastfeeding? No  BMI 28.49 kg/m2    Vitals were reviewed and were normal.     Physical Exam "   Constitutional: She is oriented to person, place, and time. She appears well-developed and well-nourished. No distress.   Cardiovascular: Normal rate and regular rhythm.  Exam reveals no gallop and no friction rub.    No murmur heard.  Pulmonary/Chest: Effort normal and breath sounds normal. No respiratory distress. She has no wheezes. She has no rales.   Abdominal: Soft. Bowel sounds are normal. She exhibits no distension and no mass. There is no tenderness. There is no rebound and no guarding.   Neurological: She is alert and oriented to person, place, and time.   Skin: Skin is warm and dry.   Psychiatric: She has a normal mood and affect. Her behavior is normal.         Results:     No laboratory testing completed today.    Assessment and Plan     1. Gastroesophageal reflux disease, esophagitis presence not specified  Comment: Reports 6-8 month hx GERD symptoms. Has employed some lifestyle changes and trigger avoidance, however symptoms persist. Has tried ranitidine, however has not taken consistently. Is interested in trial PPI. Counseled pt on risks/benefits of PPI trial. Advise to continue employing lifestyle changes and avoidance of triggers. Will start with ranitidine 150 mg BID. If no improvement, will consider 2 week trial of omeprazole. If symptoms continue to persist after that, will consider EGD.   Plan:   - ranitidine (ZANTAC) 150 MG tablet; Take 1 tablet (150 mg) by mouth 2 times daily  Dispense: 60 tablet; Refill: 1   - Counseled on lifestyle changes and avoidance of triggers as outlined below   - Return to clinic if symptoms persist    2. Mild intermittent asthma without complication  Comment: Susie uses her URIEL once daily in the morning 5-7 minutes prior to exercise. She has made it part of her daily routine, otherwise she will forget. Has found exercise to trigger asthmatic symptoms. Feels asthma is under good control. Susie is requesting a refill on her albuterol inhaler today.  Plan:   -  albuterol (PROAIR HFA/PROVENTIL HFA/VENTOLIN HFA) 108 (90 Base) MCG/ACT Inhaler; Inhale 2 puffs into the lungs every 6 hours  Dispense: 1 Inhaler; Refill: 3    3. Excessive bleeding in premenopausal period  Comment: Susie is requesting a refill on tranexamic acid 650 mg tablet, which she takes for management of menorrhagia. Takes as needed and uses very sparingly. Last prescription was for 30 tablets and lasted her nearly one year. Historically prescribed by OB/GYN.  Plan:   - tranexamic acid (LYSTEDA) 650 MG tablet; Take 2 tablets (1,300 mg) by mouth daily as needed (Menorrhagia)  Dispense: 30 tablet; Refill: 0      Follow-up: Return to clinic if symptoms fail to improve, worsen, or new symptoms develop with the above treatment plan.       Medications Discontinued During This Encounter   Medication Reason     albuterol (PROAIR HFA/PROVENTIL HFA/VENTOLIN HFA) 108 (90 Base) MCG/ACT Inhaler Reorder     tranexamic acid (LYSTEDA) 650 MG tablet Reorder     Options for treatment and follow-up care were reviewed with the patient. Susie Dupont  engaged in the decision making process and verbalized understanding of the options discussed and agreed with the final plan.    LAZARA Gonzalez CNP

## 2018-06-15 ASSESSMENT — ANXIETY QUESTIONNAIRES: GAD7 TOTAL SCORE: 0

## 2018-06-15 ASSESSMENT — PATIENT HEALTH QUESTIONNAIRE - PHQ9: SUM OF ALL RESPONSES TO PHQ QUESTIONS 1-9: 0

## 2018-10-22 ENCOUNTER — OFFICE VISIT (OUTPATIENT)
Dept: FAMILY MEDICINE | Facility: CLINIC | Age: 53
End: 2018-10-22
Payer: COMMERCIAL

## 2018-10-22 VITALS
RESPIRATION RATE: 16 BRPM | SYSTOLIC BLOOD PRESSURE: 132 MMHG | HEART RATE: 61 BPM | OXYGEN SATURATION: 97 % | HEIGHT: 64 IN | BODY MASS INDEX: 28.34 KG/M2 | TEMPERATURE: 97.9 F | DIASTOLIC BLOOD PRESSURE: 84 MMHG | WEIGHT: 166 LBS

## 2018-10-22 DIAGNOSIS — Z01.818 PRE-OPERATIVE EXAMINATION: Primary | ICD-10-CM

## 2018-10-22 DIAGNOSIS — N93.9 ABNORMAL VAGINAL BLEEDING: ICD-10-CM

## 2018-10-22 NOTE — NURSING NOTE
"53 year old  Chief Complaint   Patient presents with     Pre-Op Exam     Pt. presents to the clinic for a pre op exam. Surgery scheduled for Oct. 30, 2018. Dr. Woods Hysterscopy.        Blood pressure 132/84, pulse 61, temperature 97.9  F (36.6  C), temperature source Oral, resp. rate 16, height 5' 4.2\" (163.1 cm), weight 166 lb (75.3 kg), last menstrual period 10/01/2018, SpO2 97 %, not currently breastfeeding. Body mass index is 28.32 kg/(m^2).  BP completed using cuff size:    Patient Active Problem List   Diagnosis     Mild persistent asthma without complication     Menorrhagia     Chronic rhinitis     Dysmenorrhea     Carcinoid tumor     Gastroesophageal reflux disease, esophagitis presence not specified       Wt Readings from Last 2 Encounters:   10/22/18 166 lb (75.3 kg)   06/14/18 166 lb (75.3 kg)     BP Readings from Last 3 Encounters:   10/22/18 132/84   06/14/18 130/80   06/23/17 133/83       Allergies   Allergen Reactions     Chlorpheniramine-Phenylephrine Other (See Comments)     Seasonal Allergies Difficulty breathing     Apples [Apple] Rash     Kiwi Rash       Current Outpatient Prescriptions   Medication     albuterol (PROAIR HFA/PROVENTIL HFA/VENTOLIN HFA) 108 (90 Base) MCG/ACT Inhaler     fluticasone (FLONASE) 50 MCG/ACT spray     loratadine (CLARITIN) 10 MG tablet     ranitidine (ZANTAC) 150 MG tablet     tranexamic acid (LYSTEDA) 650 MG tablet     No current facility-administered medications for this visit.        Social History   Substance Use Topics     Smoking status: Never Smoker     Smokeless tobacco: Never Used     Alcohol use Yes      Comment: 2-3 glasses of wine per week         Honoring Choices - Health Care Directive Guide offered to patient at time of visit.    Health Maintenance Due   Topic Date Due     TETANUS IMMUNIZATION (SYSTEM ASSIGNED)  08/08/1984     HIV SCREEN (SYSTEM ASSIGNED)  08/08/1984     PAP SCREENING Q3 YR (SYSTEM ASSIGNED)  08/08/1987     LIPID SCREEN Q5 YR FEMALE " (SYSTEM ASSIGNED)  08/08/2011     COLON CANCER SCREEN (SYSTEM ASSIGNED)  08/08/2016     ASTHMA CONTROL TEST Q6 MOS  12/23/2017     ASTHMA ACTION PLAN Q1 YR  04/11/2018     INFLUENZA VACCINE (1) 09/01/2018         There is no immunization history on file for this patient.    No results found for: PAP      No lab results found.    PHQ-2 ( 1999 Pfizer) 10/22/2018 6/14/2018   Q1: Little interest or pleasure in doing things 0 0   Q2: Feeling down, depressed or hopeless 0 0   PHQ-2 Score 0 0       PHQ-9 SCORE 4/10/2017 6/23/2017 6/14/2018   Total Score 0 0 0       CLAYTON-7 SCORE 4/10/2017 6/23/2017 6/14/2018   Total Score 0 0 0       ACT Total Scores 4/10/2017 6/23/2017   ACT TOTAL SCORE (Goal Greater than or Equal to 20) 20 24   In the past 12 months, how many times did you visit the emergency room for your asthma without being admitted to the hospital? 0 0   In the past 12 months, how many times were you hospitalized overnight because of your asthma? 0 0       Aura Galvan CMA  October 22, 2018 3:22 PM

## 2018-10-22 NOTE — MR AVS SNAPSHOT
"              After Visit Summary   10/22/2018    Susie Dupont    MRN: [id number]           Patient Information     Date Of Birth          1965        Visit Information        Provider Department      10/22/2018 3:00 PM Jinny Bradshaw APRN CNP [facility]        Today's Diagnoses     Pre-operative examination    -  1    Abnormal vaginal bleeding           Follow-ups after your visit        Who to contact     Please call your clinic at [phone] to:    Ask questions about your health    Make or cancel appointments    Discuss your medicines    Learn about your test results    Speak to your doctor            Additional Information About Your Visit        MyChart Information     Yesmail is an electronic gateway that provides easy, online access to your medical records. With Yesmail, you can request a clinic appointment, read your test results, renew a prescription or communicate with your care team.     To sign up for Yesmail visit the website at [email or web address]   You will be asked to enter the access code listed below, as well as some personal information. Please follow the directions to create your username and password.     Your access code is: [email or web address]  Expires: 2019  4:02 PM     Your access code will  in 90 days. If you need help or a new code, please contact your [facility] or call [phone] for assistance.        Care EveryWhere ID     This is your Care EveryWhere ID. This could be used by other organizations to access your [place] medical records  [id number]        Your Vitals Were     Pulse Temperature Respirations Height Last Period Pulse Oximetry    61 97.9  F (36.6  C) (Oral) 16 5' 4.2\" (163.1 cm) 10/01/2018 (Approximate) 97%    BMI (Body Mass Index)                   28.32 kg/m2            Blood Pressure from Last 3 Encounters:   10/22/18 132/84   18 130/80   17 133/83    Weight from Last 3 Encounters:   10/22/18 " 166 lb (75.3 kg)   06/14/18 166 lb (75.3 kg)   06/23/17 167 lb 1.6 oz (75.8 kg)              Today, you had the following     No orders found for display       Primary Care Provider    None Specified       No primary provider on file.        Equal Access to Services     NATALEEGERARD SERGE : Hadii aad ku hadkggood Sojulietali, waaxda luqadaha, qaybta kaalmada adejessenia, huey gonzalez lowelllalo sandersbricetina gentile . So [facility] [phone].    ATENCIÓN: Si habla español, tiene a conroy disposición servicios gratuitos de asistencia lingüística. Llame al [phone].    We comply with applicable federal civil rights laws and [place] laws. We do not discriminate on the basis of race, color, national origin, age, disability, sex, sexual orientation, or gender identity.            Thank you!     Thank you for choosing [facility]  for your care. Our goal is always to provide you with excellent care. Hearing back from our patients is one way we can continue to improve our services. Please take a few minutes to complete the written survey that you may receive in the mail after your visit with us. Thank you!             Your Updated Medication List - Protect others around you: Learn how to safely use, store and throw away your medicines at www.disposemymeds.org.          This list is accurate as of 10/22/18  4:02 PM.  Always use your most recent med list.                   Brand Name Dispense Instructions for use Diagnosis    albuterol 108 (90 Base) MCG/ACT inhaler    PROAIR HFA/PROVENTIL HFA/VENTOLIN HFA    1 Inhaler    Inhale 2 puffs into the lungs every 6 hours    Mild intermittent asthma without complication       CLARITIN 10 MG tablet   Generic drug:  loratadine      Take 10 mg by mouth daily        fluticasone 50 MCG/ACT spray    FLONASE    3 Bottle    Spray 2 sprays into both nostrils daily    Chronic rhinitis       ranitidine 150 MG tablet    ZANTAC    60 tablet    Take 1 tablet (150 mg) by mouth 2 times daily     Gastroesophageal reflux disease, esophagitis presence not specified       tranexamic acid 650 MG tablet    LYSTEDA    30 tablet    Take 2 tablets (1,300 mg) by mouth daily as needed (Menorrhagia)    Excessive bleeding in premenopausal period

## 2018-10-22 NOTE — PROGRESS NOTES
[facility]  [address] [place] [id number]  Phone: [phone]  Fax: [phone]    10/22/2018    Adult PRE-OP Evaluation:    Susie Dupont, 1965 presents for pre-operative evaluation and assessment as requested by Dr. Marine Woods, prior to undergoing surgery/procedure for treatment of abnormal vaginal bleeding.      History of presenting illness: Patient with history of known uterine fibroid(s) that have been regularly monitored by ultrasound for the past 2+ years. Has been experiencing irregular vaginal bleeding which is thought to be related to the uterine fibroid. Additionally, IUD previously placed for management of irregular vaginal bleeding and for contraception. About one month ago, IUD was no longer visible by ultrasound.     Proposed procedure: Hysteroscopy, Dilation and Curettage, Myomectomy, Possible IUD Removal, Possible IUD Insertion    Date of Surgery/ Procedure: 10/30/2018  Hospital/Surgical Facility: [facility]     Primary Physician: [facility]  Type of Anesthesia Anticipated: MAC  History of anesthesia complications: NONE  History of  abnormal bleeding: YES: Personal History of abnormal irregular vaginal bleeding  History of blood transfusions: NO  Patient has a Health Care Directive or Living Will: NO    Preoperative Screening Questions   1- NO - Do you ever have any pain or discomfort in your chest?  2- NO - Have you ever had a severe pain across the front of your chest lasting for half an hour or more?  3- NO - Do you have swelling in your feet or ankles at times?  4- YES - Are you troubled by shortness of breath when: walking on the level/ up a slight hill/ at night? If going up flight of stairs immediately following a meal.   5- YES - Does your chest ever sound wheezy or whistling? Related to history of asthma.   6- YES - Do you currently have a cold, bronchitis or other respiratory infection? Sneezing over  past couple of days, no other symptoms.   7- NO - Have you had a cold, bronchitis or other respiratory infection within the last 2 weeks?  8- NO - Do you usually have a cough?  9- NO - Do you sometimes get pains in the calves of your legs when you walk?  10- YES - Do you or anyone in your family have previous history of blood clots? Sister with history of DVT many years ago. Frequent travel. She was on chronic blood thinners for a period of time, no longer taking.   11-NO - Do you or does anyone in your family have serious bleeding problem such as prolonged bleeding following surgeries or cuts?  12- YES - Have you ever had problems with anemia or been told to take iron pills? Sever menorrhagia several years ago.   13-NO - Have you had any abnormal blood loss such as black, tarry or bloody stools, or abnormal vaginal bleeding?  14-NO - Have you or any of your relatives ever had problems with anesthesia?  15- YES - Do you snore or stop breathing at night? Occasionally snoring at night.   16-NO - Do you have any prosthetic heart valves or joints?  17-NO - Is there any chance that you may be pregnant?       Audit C Alcohol Screening Tool  AUDIT   Questions 0 1 2 3 4 Score   1. How often do you have a drink  containing alcohol? Never Monthly or less 2 to 4  times a  month 2 to 3  times a  week 4 or more  times a  week 3   2. How many drinks containing alcohol  do you have on a typical day when you are drinking? 1 or 2 3 or 4 5 or 6 7 to 9 10 or more 0   3. How often do you have more than five  or more drinks on one occasion? Never Less  than  monthly Monthly Weekly Daily or  almost  daily 0     Scoring: Score: 3. A score of 4 for adult men or 3 for adult women is an indication of hazardous drinking (risk for physical or physiological harm). A score of 5 or more for adult men or 4 or more for adult women is an indication of an alcohol use disorder.    Sleep apnea clinical score: STOP Bang Questionnaire: 2, low risk for  ALIA    Patient Active Problem List   Diagnosis     Mild persistent asthma without complication     Menorrhagia     Chronic rhinitis     Dysmenorrhea     Carcinoid tumor     Gastroesophageal reflux disease, esophagitis presence not specified       Current Outpatient Prescriptions on File Prior to Visit:  albuterol (PROAIR HFA/PROVENTIL HFA/VENTOLIN HFA) 108 (90 Base) MCG/ACT Inhaler Inhale 2 puffs into the lungs every 6 hours   fluticasone (FLONASE) 50 MCG/ACT spray Spray 2 sprays into both nostrils daily   loratadine (CLARITIN) 10 MG tablet Take 10 mg by mouth daily   ranitidine (ZANTAC) 150 MG tablet Take 1 tablet (150 mg) by mouth 2 times daily   tranexamic acid (LYSTEDA) 650 MG tablet Take 2 tablets (1,300 mg) by mouth daily as needed (Menorrhagia)     No current facility-administered medications on file prior to visit.     OTC products: None, except as noted above    Allergies   Allergen Reactions     Chlorpheniramine-Phenylephrine Other (See Comments)     Seasonal Allergies Difficulty breathing     Apples [Apple] Rash     Kiwi Rash     Latex Allergy: NO    Social History     Social History     Marital status:      Spouse name: Ernie Gee     Number of children: 2     Years of education: PhD     Occupational History     [employer]     Social History Main Topics     Smoking status: Never Smoker     Smokeless tobacco: Never Used     Alcohol use Yes      Comment: 2-3 glasses of wine per week     Drug use: No     Sexual activity: Yes     Partners: Male     Birth control/ protection: IUD      Comment: [facility] [place]     Other Topics Concern     Not on file     Social History Narrative    Activity: Exercise 30 min daily doing treadmill, bike    Diet: Average    Water Intake: Sufficient    Caffeine Intake: 2 cups a day    Sleep: 6-8 hrs/ night    Stressors: None    Support Network:    Isabel/Jew Affiliation:    Childhood:    Current Living  "Situation:    Future:           REVIEW OF SYSTEMS:   CONSTITUTIONAL: NEGATIVE for fever, chills, change in weight  INTEGUMENTARY/SKIN: NEGATIVE for worrisome rashes, moles or lesions  EYES: NEGATIVE for vision changes or irritation  ENT/MOUTH: NEGATIVE for ear, mouth and throat problems. Recent sneezing, possibly related to allergies.   RESP: NEGATIVE for significant cough or SOB  BREAST: NEGATIVE for masses, tenderness or discharge  CV: NEGATIVE for chest pain, palpitations or peripheral edema  GI: NEGATIVE for nausea, abdominal pain, heartburn, or change in bowel habits  : NEGATIVE for frequency, dysuria, or hematuria  MUSCULOSKELETAL: NEGATIVE for significant arthralgias or myalgia  NEURO: NEGATIVE for weakness, dizziness or paresthesias  ENDOCRINE: NEGATIVE for temperature intolerance, skin/hair changes  HEME: NEGATIVE for bleeding problems  PSYCHIATRIC: NEGATIVE for changes in mood or affect    EXAM:     Patient Vitals for the past 24 hrs:   BP Temp Temp src Pulse Resp SpO2 Height Weight   10/22/18 1519 132/84 97.9  F (36.6  C) Oral 61 16 97 % 5' 4.2\" (163.1 cm) 166 lb (75.3 kg)     Body mass index is 28.32 kg/(m^2).    Constitutional: appears to be in no acute distress, comfortable, pleasant.   Eyes: anicteric, conjunctiva clear without drainage or erythema. SHARIF.  Ears, Nose and Throat: tympanic membranes gray with LR,  nose without nasal discharge. OP: no erythema to posterior pharynx, negative post nasal drainage, tonsils +1 no erythema or exudate.  Neck: supple, thyroid palpable,not enlarged, no nodules   Cardiovascular: regular rate and rhythm, normal S1 and S2, no murmurs, rubs or gallops, peripheral pulses full and symmetric; negative peripheral edema   Respiratory: Air entry throughout. Breathing pattern unlabored without the use of accessory muscles. Clear to auscultation A and P, no wheezes or crackles, normal breath sounds.    Gastrointestinal: rounded, soft. Positive bowel sounds x4, nontender, " no masses.   Musculoskeletal: full range of motion, no edema.   Skin: pink, turgor smooth and elastic. Negative for lesions or dryness.  Neurological: normal gait, no tremor.   Psychological: appropriate mood and affect.   Lymphatic: no cervical, axillary, supraclavicular, or infraclavicular lymphadenopathy.    DIAGNOSTICS:      No labs or EKG required for low risk surgery (cataract, skin procedure, breast biopsy, etc)  EKG: Not indicated due to non-vascular surgery and low risk of event (age <65 and without cardiac risk factors)    RISK ASSESSMENT:     Cardiovascular Risk:  -Patient is able to perform ADL's without assistance without chest pain.  -The patient does not have chest pain with exertion or at rest.  -Patient does not have a history of congestive heart failure.    -The patient does not have a history of stroke and does not have a history of valvular disease.    Pulmonary Risk:  -In terms of risk factors for pulmonary complication, the patient has asthma managed with albuterol inhaler as needed.    Perioperative Complications:  -The patient does not have a history of bleeding or clotting problems in the past.    -The patient has not had complications from surgeries.    -The patient does not have a family history of any anesthesia or surgical complications.      IMPRESSION:     Reason for surgery/procedure: Abnormal vaginal bleeding    The proposed surgical procedure is considered LOW risk.    For above listed surgery and anesthesia:   Patient is at low risk for surgery/procedure and perioperative/procedure complications.    RECOMMENDATIONS:     Labs:  None    Fasting:  NPO for 12 hours prior to surgery    Preop Plan:  --Approval given to proceed with proposed procedure, without further diagnostic evaluation  --Patient has   Pulmonary Risk  --History of asthma    Medications:  Patient should take their regular medications the morning of surgery unless otherwise instructed.    Hold aspirin 7 days prior to  surgery.  Hold ibuprofen for 5 days prior.      LAZARA Gonzalez CNP    Please contact our office if there are any further questions or information required about this patient.

## 2018-10-26 ASSESSMENT — MIFFLIN-ST. JEOR
SCORE: 1337.97
SCORE: 1337.97

## 2018-10-29 ENCOUNTER — ANESTHESIA - HEALTHEAST (OUTPATIENT)
Dept: SURGERY | Facility: AMBULATORY SURGERY CENTER | Age: 53
End: 2018-10-29

## 2018-10-30 ENCOUNTER — SURGERY - HEALTHEAST (OUTPATIENT)
Dept: SURGERY | Facility: AMBULATORY SURGERY CENTER | Age: 53
End: 2018-10-30

## 2018-10-30 ENCOUNTER — HOSPITAL ENCOUNTER (OUTPATIENT)
Dept: SURGERY | Facility: AMBULATORY SURGERY CENTER | Age: 53
Discharge: HOME | End: 2018-10-30
Attending: OBSTETRICS & GYNECOLOGY | Admitting: OBSTETRICS & GYNECOLOGY
Payer: COMMERCIAL

## 2018-10-30 DIAGNOSIS — Z98.890 STATUS POST HYSTEROSCOPY: ICD-10-CM

## 2018-10-30 DIAGNOSIS — D25.9 UTERINE MYOMA: ICD-10-CM

## 2018-10-30 DIAGNOSIS — N92.0 MENORRHAGIA: ICD-10-CM

## 2018-10-30 LAB
GLUCOSE BLDC GLUCOMTR-MCNC: 13.6 MG/DL (ref 70–125)
POC PREG URINE (HCG) HE - HISTORICAL: NEGATIVE
POCT KIT EXPIRATION DATE - HISTORICAL: NORMAL
POCT KIT LOT NUMBER - HISTORICAL: NORMAL

## 2018-10-30 ASSESSMENT — MIFFLIN-ST. JEOR
SCORE: 1337.97
SCORE: 1337.97

## 2019-02-22 ENCOUNTER — TELEPHONE (OUTPATIENT)
Dept: FAMILY MEDICINE | Facility: CLINIC | Age: 54
End: 2019-02-22

## 2019-02-22 NOTE — TELEPHONE ENCOUNTER
Spoke with Patient to set up an annual exam to address some routine health maintenance. Patient stated she was very busy at this time and wouldcall us back next week to address setting up an appointment.     Aura Galvan, TK     Health Maintenance Due   Topic Date Due     PREVENTIVE CARE VISIT  1965     HIV SCREEN (SYSTEM ASSIGNED)  08/08/1984     PAP SCREENING Q3 YR (SYSTEM ASSIGNED)  08/08/1987     DTAP/TDAP/TD IMMUNIZATION (1 - Tdap) 08/08/1991     LIPID SCREEN Q5 YR FEMALE (SYSTEM ASSIGNED)  08/08/2011     COLON CANCER SCREEN (SYSTEM ASSIGNED)  08/08/2016     ZOSTER IMMUNIZATION (1 of 2) 08/08/2016     ASTHMA CONTROL TEST Q6 MOS  12/23/2017     ASTHMA ACTION PLAN Q1 YR  04/11/2018     INFLUENZA VACCINE (1) 09/01/2018

## 2019-04-23 ENCOUNTER — HOSPITAL ENCOUNTER (OUTPATIENT)
Dept: MAMMOGRAPHY | Facility: CLINIC | Age: 54
Discharge: HOME | End: 2019-04-23
Attending: OBSTETRICS & GYNECOLOGY

## 2019-04-23 ENCOUNTER — TRANSFERRED RECORDS (OUTPATIENT)
Dept: HEALTH INFORMATION MANAGEMENT | Facility: CLINIC | Age: 54
End: 2019-04-23

## 2019-04-23 DIAGNOSIS — Z12.31 VISIT FOR SCREENING MAMMOGRAM: ICD-10-CM

## 2019-06-03 DIAGNOSIS — J45.20 MILD INTERMITTENT ASTHMA WITHOUT COMPLICATION: ICD-10-CM

## 2019-06-04 RX ORDER — ALBUTEROL SULFATE 90 UG/1
2 AEROSOL, METERED RESPIRATORY (INHALATION) EVERY 6 HOURS
Qty: 18 G | Refills: 1 | Status: SHIPPED | OUTPATIENT
Start: 2019-06-04 | End: 2019-08-06

## 2019-06-04 NOTE — TELEPHONE ENCOUNTER
Called the patient and relayed the fact the patient would need to follow up for the above mediation. Patient understood and said she knew she needed to follow up in regards to it. Patient stated she currently was in a meeting so an appointment was not scheduled.     Yael Hollins CMA

## 2019-06-04 NOTE — TELEPHONE ENCOUNTER
albuterol (PROAIR HFA/PROVENTIL HFA/VENTOLIN HFA) 108 (90 Base) MCG/ACT inhaler      Last Written Prescription Date:  6-14-18  Last Fill Quantity: 1 inhaler,   # refills: 3  Last Office Visit : 10-22-18  Future Office visit:  none     FYI to provider overdue ACT Test    Scheduling has been notified to contact the pt for appointment.    90 day RF sent to pharmacy    Overridden by Jinny [place] APRN CNP on Jun 14, 2018 3:47 PM   Allergy/Contraindication   1. CHLORPHENIRAMINE-PHENYLEPHRINE [Level: Drug Class Match]

## 2019-08-06 ENCOUNTER — OFFICE VISIT (OUTPATIENT)
Dept: FAMILY MEDICINE | Facility: CLINIC | Age: 54
End: 2019-08-06
Payer: COMMERCIAL

## 2019-08-06 VITALS
BODY MASS INDEX: 28.85 KG/M2 | SYSTOLIC BLOOD PRESSURE: 145 MMHG | WEIGHT: 169 LBS | RESPIRATION RATE: 16 BRPM | HEIGHT: 64 IN | OXYGEN SATURATION: 99 % | HEART RATE: 74 BPM | DIASTOLIC BLOOD PRESSURE: 82 MMHG | TEMPERATURE: 98.2 F

## 2019-08-06 DIAGNOSIS — J45.20 MILD INTERMITTENT ASTHMA WITHOUT COMPLICATION: ICD-10-CM

## 2019-08-06 DIAGNOSIS — R03.0 ELEVATED BLOOD PRESSURE READING WITHOUT DIAGNOSIS OF HYPERTENSION: ICD-10-CM

## 2019-08-06 DIAGNOSIS — Z00.00 ENCOUNTER FOR PREVENTIVE HEALTH EXAMINATION: Primary | ICD-10-CM

## 2019-08-06 DIAGNOSIS — L98.9 SKIN LESION: ICD-10-CM

## 2019-08-06 DIAGNOSIS — Z12.11 SPECIAL SCREENING FOR MALIGNANT NEOPLASMS, COLON: ICD-10-CM

## 2019-08-06 DIAGNOSIS — D22.9 ATYPICAL NEVI: ICD-10-CM

## 2019-08-06 DIAGNOSIS — J31.0 CHRONIC RHINITIS: ICD-10-CM

## 2019-08-06 DIAGNOSIS — K21.9 GASTROESOPHAGEAL REFLUX DISEASE, ESOPHAGITIS PRESENCE NOT SPECIFIED: ICD-10-CM

## 2019-08-06 LAB
CHOLEST SERPL-MCNC: 228 MG/DL
HBA1C MFR BLD: 6 % (ref 4.1–5.7)
HDLC SERPL-MCNC: 53 MG/DL
LDLC SERPL CALC-MCNC: 144 MG/DL
NONHDLC SERPL-MCNC: 174 MG/DL
TRIGL SERPL-MCNC: 150 MG/DL

## 2019-08-06 RX ORDER — ALBUTEROL SULFATE 90 UG/1
2 AEROSOL, METERED RESPIRATORY (INHALATION) EVERY 6 HOURS
Qty: 18 G | Refills: 1 | Status: SHIPPED | OUTPATIENT
Start: 2019-08-06 | End: 2019-12-04

## 2019-08-06 RX ORDER — FLUTICASONE PROPIONATE 50 MCG
2 SPRAY, SUSPENSION (ML) NASAL DAILY
Qty: 9.9 ML | Refills: 1 | Status: SHIPPED | OUTPATIENT
Start: 2019-08-06

## 2019-08-06 ASSESSMENT — ANXIETY QUESTIONNAIRES
2. NOT BEING ABLE TO STOP OR CONTROL WORRYING: NOT AT ALL
3. WORRYING TOO MUCH ABOUT DIFFERENT THINGS: NOT AT ALL
5. BEING SO RESTLESS THAT IT IS HARD TO SIT STILL: NOT AT ALL
6. BECOMING EASILY ANNOYED OR IRRITABLE: SEVERAL DAYS
GAD7 TOTAL SCORE: 1
IF YOU CHECKED OFF ANY PROBLEMS ON THIS QUESTIONNAIRE, HOW DIFFICULT HAVE THESE PROBLEMS MADE IT FOR YOU TO DO YOUR WORK, TAKE CARE OF THINGS AT HOME, OR GET ALONG WITH OTHER PEOPLE: NOT DIFFICULT AT ALL
7. FEELING AFRAID AS IF SOMETHING AWFUL MIGHT HAPPEN: NOT AT ALL
1. FEELING NERVOUS, ANXIOUS, OR ON EDGE: NOT AT ALL

## 2019-08-06 ASSESSMENT — MIFFLIN-ST. JEOR: SCORE: 1363.17

## 2019-08-06 ASSESSMENT — PATIENT HEALTH QUESTIONNAIRE - PHQ9
5. POOR APPETITE OR OVEREATING: NOT AT ALL
SUM OF ALL RESPONSES TO PHQ QUESTIONS 1-9: 0

## 2019-08-06 NOTE — NURSING NOTE
"53 year old  Chief Complaint   Patient presents with     Physical     Needs refills       Blood pressure (!) 155/80, pulse 74, temperature 98.2  F (36.8  C), temperature source Oral, resp. rate 16, height 1.628 m (5' 4.1\"), weight 76.7 kg (169 lb), SpO2 99 %, not currently breastfeeding. Body mass index is 28.92 kg/m .  BP completed using cuff size:    Patient Active Problem List   Diagnosis     Mild persistent asthma without complication     Menorrhagia     Chronic rhinitis     Dysmenorrhea     Carcinoid tumor     Gastroesophageal reflux disease, esophagitis presence not specified       Wt Readings from Last 2 Encounters:   08/06/19 76.7 kg (169 lb)   10/22/18 75.3 kg (166 lb)     BP Readings from Last 3 Encounters:   08/06/19 (!) 155/80   10/22/18 132/84   06/14/18 130/80       Allergies   Allergen Reactions     Chlorpheniramine-Phenylephrine Other (See Comments)     Seasonal Allergies Difficulty breathing     Apples [Apple] Rash     Kiwi Rash       Current Outpatient Medications   Medication     albuterol (PROAIR HFA/PROVENTIL HFA/VENTOLIN HFA) 108 (90 Base) MCG/ACT inhaler     fluticasone (FLONASE) 50 MCG/ACT spray     loratadine (CLARITIN) 10 MG tablet     ranitidine (ZANTAC) 150 MG tablet     tranexamic acid (LYSTEDA) 650 MG tablet     No current facility-administered medications for this visit.        Social History     Tobacco Use     Smoking status: Never Smoker     Smokeless tobacco: Never Used   Substance Use Topics     Alcohol use: Yes     Comment: 2-3 glasses of wine per week     Drug use: No       Honoring Choices - Health Care Directive Guide offered to patient at time of visit.    Health Maintenance Due   Topic Date Due     PAP  1965     COLONOSCOPY  08/08/1976     HIV SCREENING  08/08/1981     DTAP/TDAP/TD IMMUNIZATION (1 - Tdap) 08/08/1991     LIPID  08/08/2011     ZOSTER IMMUNIZATION (1 of 2) 08/08/2016     ASTHMA CONTROL TEST  12/23/2017     PREVENTIVE CARE VISIT  04/10/2018     ASTHMA " ACTION PLAN  04/11/2018     PHQ-2  01/01/2019     MAMMO SCREENING  05/30/2019         There is no immunization history on file for this patient.    No results found for: PAP      No lab results found.    PHQ-2 ( 1999 Pfizer) 10/22/2018 6/14/2018   Q1: Little interest or pleasure in doing things 0 0   Q2: Feeling down, depressed or hopeless 0 0   PHQ-2 Score 0 0       PHQ-9 SCORE 4/10/2017 6/23/2017 6/14/2018 8/6/2019   PHQ-9 Total Score 0 0 0 0       CLAYTON-7 SCORE 6/23/2017 6/14/2018 8/6/2019   Total Score 0 0 1       ACT Total Scores 4/10/2017 6/23/2017 8/6/2019   ACT TOTAL SCORE (Goal Greater than or Equal to 20) 20 24 22   In the past 12 months, how many times did you visit the emergency room for your asthma without being admitted to the hospital? 0 0 0   In the past 12 months, how many times were you hospitalized overnight because of your asthma? 0 0 0         Yael Hollins CMA  August 6, 2019 10:13 AM

## 2019-08-06 NOTE — PATIENT INSTRUCTIONS
1) Recommend vitamin D3 2,000 units daily, Calcium 1200 mg/day (from diet and supplements if needed).     Postmenopausal women who are getting adequate calcium from dietary intake alone (approximately 1200 mg daily) do not need to take calcium supplements. Women with inadequate dietary intake should take supplemental elemental calcium (generally 500 to 1000 mg/day), in divided doses at mealtime, such that their total calcium intake (diet plus supplements) approximates 1200 mg/day.    2) Recommend pneumococcal vaccine (PPSV23) one time dose. Your history of asthma puts you at risk for pneumonia. If you choose to have this vaccine you will have added protection from pneumonia until age 65 at which time we recommend two additional doses of pneumococcal vaccine spaced 5 years apart.    3) Shingles vaccine: Shingrix. Adults ?50 years: IM: 0.5 mL administered as a 2-dose series at 0 and 2 to 6 months. CDC/ACIP recommendations: If the primary series is delayed or interrupted, the series does not need to be restarted. If the interval between dose 1 and 2 is <4 weeks, then the second dose should be repeated    4) Let me know what you decide about which method of colon cancer screening you would prefer upon return of your travels.    5) I would like you to check your blood pressure at home. You can purchase a blood pressure cuff online for a reasonable price. Omron is a good brand ($30-40). It would be best to check your BP 2-3 times weekly and if your readings are consistently >130/80, I would like you to return to clinic to discuss possibly starting blood pressure medication.     Preventive Health Recommendations  Female Ages 50 - 64    Yearly exam: See your health care provider every year in order to  o Review health changes.   o Discuss preventive care.    o Review your medicines if your doctor has prescribed any.      Get a Pap test every three years (unless you have an abnormal result and your provider advises testing  more often).    If you get Pap tests with HPV test, you only need to test every 5 years, unless you have an abnormal result.     You do not need a Pap test if your uterus was removed (hysterectomy) and you have not had cancer.    You should be tested each year for STDs (sexually transmitted diseases) if you're at risk.     Have a mammogram every 1 to 2 years.    Have a colonoscopy at age 50, or have a yearly FIT test (stool test). These exams screen for colon cancer.      Have a cholesterol test every 5 years, or more often if advised.    Have a diabetes test (fasting glucose) every three years. If you are at risk for diabetes, you should have this test more often.     If you are at risk for osteoporosis (brittle bone disease), think about having a bone density scan (DEXA).    Shots: Get a flu shot each year. Get a tetanus shot every 10 years.    Nutrition:     Eat at least 5 servings of fruits and vegetables each day.    Eat whole-grain bread, whole-wheat pasta and brown rice instead of white grains and rice.    Get adequate Calcium and Vitamin D.     Lifestyle    Exercise at least 150 minutes a week (30 minutes a day, 5 days a week). This will help you control your weight and prevent disease.    Limit alcohol to one drink per day.    No smoking.     Wear sunscreen to prevent skin cancer.     See your dentist every six months for an exam and cleaning.    See your eye doctor every 1 to 2 years.

## 2019-08-06 NOTE — PROGRESS NOTES
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     Healthy Habits:    Do you get at least three servings of calcium containing foods daily (dairy, green leafy vegetables, etc.)? Yes    Amount of exercise or daily activities, outside of work: exercises for 30 minutes 7 days/week, enjoys going to the gym and using elliptical, treadmill, bicycle.    Have you had an eye exam in the past two years? Yes    Do you see a dentist twice per year? Yes      Past Medical History:   Diagnosis Date     Carcinoid tumor     right lung, upper and middle lobe resected     Fibroid (bleeding) (uterine)     chonic ongoing     Uncomplicated asthma      Past Surgical History:   Procedure Laterality Date     BIOPSY       DILATION AND CURETTAGE  10/2018     GYN SURGERY  1995     section     THORACIC SURGERY      Carcinoid tumor removed      Family History   Problem Relation Age of Onset     Parkinsonism Mother      Coronary Artery Disease Mother      Cancer Father      Coronary Artery Disease Maternal Grandfather      Coronary Artery Disease Paternal Grandfather      Seasonal/Environmental Allergies Sister      Social History     Socioeconomic History     Marital status:      Spouse name: Ernie Gee     Number of children: 2     Years of education: PhD     Highest education level: Not on file   Occupational History     Occupation: [employer]     Employer: [employer]     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   Social Needs     Financial resource strain: Not on file     Food insecurity:     Worry: Not on file     Inability: Not on file     Transportation needs:     Medical: Not on file     Non-medical: Not on file   Tobacco Use     Smoking status: Never Smoker     Smokeless tobacco: Never Used   Substance and  Sexual Activity     Alcohol use: Yes     Comment: 2-3 glasses of wine per week     Drug use: No     Sexual activity: Yes     Partners: Male     Birth control/protection: IUD     Comment: [facility] [place], Mirena IUD placed February 2019   Lifestyle     Physical activity:     Days per week: Not on file     Minutes per session: Not on file     Stress: Not on file   Relationships     Social connections:     Talks on phone: Not on file     Gets together: Not on file     Attends Islam service: Not on file     Active member of club or organization: Not on file     Attends meetings of clubs or organizations: Not on file     Relationship status: Not on file     Intimate partner violence:     Fear of current or ex partner: Not on file     Emotionally abused: Not on file     Physically abused: Not on file     Forced sexual activity: Not on file   Other Topics Concern     Not on file   Social History Narrative    Activity: Exercise 30 min daily doing treadmill, bike    Diet: Average    Water Intake: Sufficient    Caffeine Intake: 2 cups a day    Sleep: 6-8 hrs/ night    Stressors: None    Support Network:    Isabel/Congregational Affiliation:    Childhood:    Current Living Situation:    Future:       Today's PHQ-2 Score: PHQ-2 Score:   PHQ-2 ( 1999 [employer]) 10/22/2018 6/14/2018   Q1: Little interest or pleasure in doing things 0 0   Q2: Feeling down, depressed or hopeless 0 0   PHQ-2 Score 0 0       Today's PHQ-9 Score:   PHQ-9 SCORE 4/10/2017 6/23/2017 6/14/2018   PHQ-9 Total Score 0 0 0       Today's CLAYTON-7 Score:   CLAYTON-7 SCORE 4/10/2017 6/23/2017 6/14/2018   Total Score 0 0 0       If you drink alcohol do you typically have >3 drinks per day or >7 drinks per week? No                     Reviewed orders with patient.  Reviewed health maintenance and updated orders accordingly - Yes      Mammogram Screening: Patient over age 50, mutual decision to screen reflected in health maintenance. Pertinent mammograms are reviewed under  the imaging tab. Patient has historically obtained mammograms through her OB/GYN, reports she is up to date on this, last mammogram took place in 2019, reports results were normal. Unable to see records at this time.     History of abnormal Pap smear: NO - age 30-65 PAP every 5 years with negative HPV co-testing recommended. Patient reports cervical cancer screening is up to date, obtains surveillance from OB/GYN clinic. Unable to see records at this time.    Reviewed and updated as needed this visit by clinical staff  Tobacco  Allergies  Meds       Reviewed and updated as needed this visit by Provider    ROS:  CONSTITUTIONAL: NEGATIVE for fever, chills, change in weight  INTEGUMENTARY/SKIN: NEGATIVE for worrisome rashes, moles or lesions. Mole in right eyebrow, present for as long as she can remember, unchanged for the most part. Lesion on right shin, appeared 2 months ago, has remained unchanged since onset, appears as a circular flesh colored mole/wart.   EYES: NEGATIVE for vision changes or irritation  ENT: NEGATIVE for ear, mouth and throat problems  RESP: NEGATIVE for significant cough or SOB  BREAST: NEGATIVE for masses, tenderness or discharge  CV: NEGATIVE for chest pain, palpitations or peripheral edema  GI: NEGATIVE for nausea, abdominal pain, change in bowel habits. Hx of possible IBS, has eliminated dairy with improvement in symptoms. Has also eliminated gluten, particularly in the second half of her day, which also seems to have helped. Uses ranitidine as needed for indigestion.   : NEGATIVE for unusual urinary or vaginal symptoms. Irractic vaginal bleeding, no bleeding in over a month. Mirena IUD in place. Follows with OBGYN for management of uterine fibroids.   MUSCULOSKELETAL: NEGATIVE for significant arthralgias or myalgia  NEURO: NEGATIVE for weakness, dizziness or paresthesias  PSYCHIATRIC: NEGATIVE for changes in mood or affect     OBJECTIVE:   BP (!) 145/82   Pulse 74   Temp 98.2  F  "(36.8  C) (Oral)   Resp 16   Ht 1.628 m (5' 4.1\")   Wt 76.7 kg (169 lb)   SpO2 99%   BMI 28.92 kg/m      EXAM:  Constitutional: appears to be in no acute distress, comfortable, pleasant.   Eyes: anicteric, conjunctiva clear without drainage or erythema. SHARIF, red reflex present bilaterally.   Ears, Nose and Throat: tympanic membranes gray with LR,  nose without nasal discharge. OP: no erythema to posterior pharynx, negative post nasal drainage, tonsils +1 no erythema or exudate.  Neck: supple, thyroid palpable,not enlarged, no nodules   Breast: Deferred, mammograms reportedly up to date.   Cardiovascular: regular rate and rhythm, normal S1 and S2, no murmurs, rubs or gallops, peripheral pulses full and symmetric; negative peripheral edema   Respiratory: Air entry throughout. Breathing pattern unlabored without the use of accessory muscles. Clear to auscultation A and P, no wheezes or crackles, normal breath sounds.    Gastrointestinal: rounded, soft. Positive bowel sounds x4, nontender, no masses. Some superficial fibrous tissue on palpation to suprapubic region of lower abdomen, suspect possible scar tissue from previous  section. Healed horizontal scar present to suprapubic region.   Genitourinary: Deferred.  Musculoskeletal: full range of motion, no edema.   Skin: pink, turgor smooth and elastic. Dry skin noted to right palm of hand. Circular, well circumscribed, raised, flesh-colored lesion noted to right shin, ~3 mm in diameter, mild flaking skin present to the lesion. No drainage or erythema. Small brown colored slightly raised nevous within right eyebrow, well defined borders, ~2 mm diameter.   Neurological: normal gait, no tremor.   Psychological: appropriate mood and affect.   Lymphatic: no cervical, axillary, supraclavicular, or infraclavicular lymphadenopathy.    Laboratory testing pending:  Lipid panel reflex to direct LDL Fasting  Hemoglobin A1C    ASSESSMENT/PLAN:     1. Encounter for " preventive health examination  Comment: Age appropriate screening and preventative care have been addressed today. Patient reports she obtains routine cervical cancer screening and breast cancer screening through her OB/GYN clinic, reports both cancer screenings are up to date. Vaccinations have been reviewed. Pt is a candidate for both pneumococcal vaccine and shingles vaccine. She declines pneumococcal vaccine today, would like to return to have this administered after her upcoming travels. Shingles vaccine is unavailable at this time. Patient has been advised to follow a balanced diet with reduction in cholesterol, and reasonable portion sizes. They have been advised to undertake routine aerobic activity and they were counseled on healthy weight maintenance. Recommend annual vision exams as well as biannual dental exams. They will follow up for annual physical again in one year.   Plan:   - Lipid panel reflex to direct LDL Fasting   - Hemoglobin A1c (AP P NP CLINIC)    2. Special screening for malignant neoplasms, colon  Comment: Colonoscopy has been reviewed and patient was counseled on available screening methods for this, she would like to think about which method she would prefer and will contact clinic after returning from her upcoming travels.   Plan:    - Patient to follow-up and notify clinic after upcoming travels with her desired screening method     3. Atypical nevi  4. Skin lesion  Comment: Pt with atypical nevous present to right eyebrow, has been present for several years, no concerning findings on exam today, however recommend referral to dermatology for routine skin check. Pt also has a lesion on right shin that has been present for ~2 months, appears circular, well circumscribed, raised, flesh-colored, dry, ~3 mm in diameter. Advise referral to dermatology for removal vs biopsy.  Plan:   - DERMATOLOGY REFERRAL    5. Elevated blood pressure reading without diagnosis of hypertension  ***    6.  "Gastroesophageal reflux disease, esophagitis presence not specified  ***  - ranitidine (ZANTAC) 150 MG tablet; Take 1 tablet (150 mg) by mouth 2 times daily  Dispense: 60 tablet; Refill: 1    7. Mild intermittent asthma without complication  ***  - albuterol (PROAIR HFA/PROVENTIL HFA/VENTOLIN HFA) 108 (90 Base) MCG/ACT inhaler; Inhale 2 puffs into the lungs every 6 hours  Dispense: 18 g; Refill: 1    8. Chronic rhinitis  ***  - fluticasone (FLONASE) 50 MCG/ACT nasal spray; Spray 2 sprays into both nostrils daily  Dispense: 9.9 mL; Refill: 1          COUNSELING:   {FEMALE COUNSELING MESSAGES:770360::\"Reviewed preventive health counseling, as reflected in patient instructions\"}    {BP Counseling- Complete if BP >= 120/80  (Optional):392423}     reports that she has never smoked. She has never used smokeless tobacco.  {Tobacco Cessation -- Complete if patient is a smoker (Optional):068454}  Estimated body mass index is 23.78 kg/(m^2) as calculated from the following:    Height as of this encounter: 5' 2\" (157.5 cm).    Weight as of this encounter: 130 lb (59 kg).   {Weight Management Plan (ACO) Complete if BMI is abnormal-  Ages 18-64  BMI >24.9.  Age 65+ with BMI <23 or >30 (Optional):596321}    Counseling Resources:  ATP IV Guidelines  Pooled Cohorts Equation Calculator  Breast Cancer Risk Calculator  FRAX Risk Assessment  ICSI Preventive Guidelines  Dietary Guidelines for Americans, 2010  USDA's MyPlate  ASA Prophylaxis  Lung CA Screening    LAZARA Gonzalez CNP on 8/6/2019 at 10:03 AM  [facility]  "

## 2019-08-07 ENCOUNTER — TELEPHONE (OUTPATIENT)
Dept: FAMILY MEDICINE | Facility: CLINIC | Age: 54
End: 2019-08-07

## 2019-08-07 ASSESSMENT — ASTHMA QUESTIONNAIRES: ACT_TOTALSCORE: 22

## 2019-08-07 ASSESSMENT — ANXIETY QUESTIONNAIRES: GAD7 TOTAL SCORE: 1

## 2019-08-07 NOTE — PROGRESS NOTES
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     Healthy Habits:    Do you get at least three servings of calcium containing foods daily (dairy, green leafy vegetables, etc.)? Yes    Amount of exercise or daily activities, outside of work: exercises for 30 minutes 7 days/week, enjoys going to the gym and using elliptical, treadmill, bicycle.    Have you had an eye exam in the past two years? Yes    Do you see a dentist twice per year? Yes      Past Medical History:   Diagnosis Date     Carcinoid tumor     right lung, upper and middle lobe resected     Fibroid (bleeding) (uterine)     chonic ongoing     Uncomplicated asthma      Past Surgical History:   Procedure Laterality Date     BIOPSY       DILATION AND CURETTAGE  10/2018     GYN SURGERY  1995     section     THORACIC SURGERY      Carcinoid tumor removed      Family History   Problem Relation Age of Onset     Parkinsonism Mother      Coronary Artery Disease Mother      Cancer Father      Coronary Artery Disease Maternal Grandfather      Coronary Artery Disease Paternal Grandfather      Seasonal/Environmental Allergies Sister      Social History     Socioeconomic History     Marital status:      Spouse name: Ernie Gee     Number of children: 2     Years of education: PhD     Highest education level: Not on file   Occupational History     Occupation: [employer]     Employer: [employer]     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   Social Needs     Financial resource strain: Not on file     Food insecurity:     Worry: Not on file     Inability: Not on file     Transportation needs:     Medical: Not on file     Non-medical: Not on file   Tobacco Use     Smoking status: Never Smoker     Smokeless tobacco: Never Used   Substance and  Sexual Activity     Alcohol use: Yes     Comment: 2-3 glasses of wine per week     Drug use: No     Sexual activity: Yes     Partners: Male     Birth control/protection: IUD     Comment: [facility], Mirena IUD placed February 2019   Lifestyle     Physical activity:     Days per week: Not on file     Minutes per session: Not on file     Stress: Not on file   Relationships     Social connections:     Talks on phone: Not on file     Gets together: Not on file     Attends Faith service: Not on file     Active member of club or organization: Not on file     Attends meetings of clubs or organizations: Not on file     Relationship status: Not on file     Intimate partner violence:     Fear of current or ex partner: Not on file     Emotionally abused: Not on file     Physically abused: Not on file     Forced sexual activity: Not on file   Other Topics Concern     Not on file   Social History Narrative    Activity: Exercise 30 min daily doing treadmill, bike    Diet: Average    Water Intake: Sufficient    Caffeine Intake: 2 cups a day    Sleep: 6-8 hrs/ night    Stressors: None    Support Network:    Isabel/Hindu Affiliation:    Childhood:    Current Living Situation:    Future:       Today's PHQ-2 Score: PHQ-2 Score:   PHQ-2 ( 1999 Pf[employer]) 10/22/2018 6/14/2018   Q1: Little interest or pleasure in doing things 0 0   Q2: Feeling down, depressed or hopeless 0 0   PHQ-2 Score 0 0       Today's PHQ-9 Score:   PHQ-9 SCORE 4/10/2017 6/23/2017 6/14/2018   PHQ-9 Total Score 0 0 0       Today's CLAYTON-7 Score:   CLAYTON-7 SCORE 4/10/2017 6/23/2017 6/14/2018   Total Score 0 0 0       If you drink alcohol do you typically have >3 drinks per day or >7 drinks per week? No                     Reviewed orders with patient.  Reviewed health maintenance and updated orders accordingly - Yes      Mammogram Screening: Patient over age 50, mutual decision to screen reflected in health maintenance. Pertinent mammograms are reviewed under  the imaging tab. Patient has historically obtained mammograms through her OB/GYN, reports she is up to date on this, last mammogram took place in 2019, reports results were normal. Unable to see records at this time.     History of abnormal Pap smear: NO - age 30-65 PAP every 5 years with negative HPV co-testing recommended. Patient reports cervical cancer screening is up to date, obtains surveillance from OB/GYN clinic. Unable to see records at this time.    Reviewed and updated as needed this visit by clinical staff  Tobacco  Allergies  Meds       Reviewed and updated as needed this visit by Provider    ROS:  CONSTITUTIONAL: NEGATIVE for fever, chills, change in weight  INTEGUMENTARY/SKIN: NEGATIVE for worrisome rashes, moles or lesions. Mole in right eyebrow, present for as long as she can remember, unchanged for the most part. Lesion on right shin, appeared 2 months ago, has remained unchanged since onset, appears as a circular flesh colored mole/wart.   EYES: NEGATIVE for vision changes or irritation  ENT: NEGATIVE for ear, mouth and throat problems  RESP: NEGATIVE for significant cough or SOB  BREAST: NEGATIVE for masses, tenderness or discharge  CV: NEGATIVE for chest pain, palpitations or peripheral edema  GI: NEGATIVE for nausea, abdominal pain, change in bowel habits. Hx of possible IBS, has eliminated dairy with improvement in symptoms. Has also eliminated gluten, particularly in the second half of her day, which also seems to have helped. Uses ranitidine as needed for indigestion.   : NEGATIVE for unusual urinary or vaginal symptoms. Irractic vaginal bleeding, no bleeding in over a month. Mirena IUD in place. Follows with OBGYN for management of uterine fibroids.   MUSCULOSKELETAL: NEGATIVE for significant arthralgias or myalgia  NEURO: NEGATIVE for weakness, dizziness or paresthesias  PSYCHIATRIC: NEGATIVE for changes in mood or affect     OBJECTIVE:   BP (!) 145/82   Pulse 74   Temp 98.2  F  "(36.8  C) (Oral)   Resp 16   Ht 1.628 m (5' 4.1\")   Wt 76.7 kg (169 lb)   SpO2 99%   BMI 28.92 kg/m      EXAM:  Constitutional: appears to be in no acute distress, comfortable, pleasant.   Eyes: anicteric, conjunctiva clear without drainage or erythema. SHARIF, red reflex present bilaterally.   Ears, Nose and Throat: tympanic membranes gray with LR,  nose without nasal discharge. OP: no erythema to posterior pharynx, negative post nasal drainage, tonsils +1 no erythema or exudate.  Neck: supple, thyroid palpable,not enlarged, no nodules   Breast: Deferred, mammograms reportedly up to date.   Cardiovascular: regular rate and rhythm, normal S1 and S2, no murmurs, rubs or gallops, peripheral pulses full and symmetric; negative peripheral edema   Respiratory: Air entry throughout. Breathing pattern unlabored without the use of accessory muscles. Clear to auscultation A and P, no wheezes or crackles, normal breath sounds.    Gastrointestinal: rounded, soft. Positive bowel sounds x4, nontender, no masses. Some superficial fibrous tissue on palpation to suprapubic region of lower abdomen, suspect possible scar tissue from previous  section. Healed horizontal scar present to suprapubic region.   Genitourinary: Deferred.  Musculoskeletal: full range of motion, no edema.   Skin: pink, turgor smooth and elastic. Dry skin noted to right palm of hand. Circular, well circumscribed, raised, flesh-colored lesion noted to right shin, ~3 mm in diameter, mild flaking skin present to the lesion. No drainage or erythema. Small brown colored slightly raised nevous within right eyebrow, well defined borders, ~2 mm diameter.   Neurological: normal gait, no tremor.   Psychological: appropriate mood and affect.   Lymphatic: no cervical, axillary, supraclavicular, or infraclavicular lymphadenopathy.    Laboratory testing pending:  Lipid panel reflex to direct LDL Fasting  Hemoglobin A1C    ASSESSMENT/PLAN:     1. Encounter for " preventive health examination  Comment: Age appropriate screening and preventative care have been addressed today. Patient reports she obtains routine cervical cancer screening and breast cancer screening through her OB/GYN clinic, reports both cancer screenings are up to date. Vaccinations have been reviewed. Last tetanus booster administered 5/31/2015. Pt is a candidate for both pneumococcal vaccine and shingles vaccine. She declines pneumococcal vaccine today, would like to return to have this administered after her upcoming travels. Shingles vaccine is unavailable at this time. Patient has been advised to follow a balanced diet with reduction in cholesterol, and reasonable portion sizes. They have been advised to undertake routine aerobic activity and they were counseled on healthy weight maintenance. Recommend annual vision exams as well as biannual dental exams. They will follow up for annual physical again in one year.   Plan:   - Lipid panel reflex to direct LDL Fasting   - Hemoglobin A1c ([facility])   - Discussed recommended daily intake of vitamin D and calcium and indications for supplementation including recommended dosing and administration    2. Special screening for malignant neoplasms, colon  Comment: Colonoscopy has been reviewed and patient was counseled on available screening methods for this, she would like to think about which method she would prefer and will contact clinic after returning from her upcoming travels.   Plan:    - Patient to follow-up and notify clinic after upcoming travels with her desired screening method     3. Atypical nevi  4. Skin lesion  Comment: Pt with atypical nevous present to right eyebrow, has been present for several years, no concerning findings on exam today, however recommend referral to dermatology for routine skin check. Pt also has a lesion on right shin that has been present for ~2 months, appears circular, well circumscribed, raised, flesh-colored,  dry, ~3 mm in diameter. Advise referral to dermatology for removal vs biopsy.  Plan:   - DERMATOLOGY REFERRAL    5. Elevated blood pressure reading without diagnosis of hypertension  Comment: Pt denies history of hyertension, BP today is 145/82. Goal BP <130/80. FHx is significant for CAD in mother, maternal grandfather, and paternal grandfather. Recommend pt monitor BP at home, 2-3 readings weekly. Omron is a good brand, offered prescription for home BP cuff but patient declines, she plans to obtain on her own. If home BP readings are consistently >130/80, recommend she return to clinic for BP follow-up to discuss potentially starting medications.   BP Readings from Last 3 Encounters:   08/06/19 (!) 145/82   10/22/18 132/84   06/14/18 130/80   Plan:   - Check BP at home 2-3 times/week and record   - Return to clinic if BP consistently >130/80    6. Gastroesophageal reflux disease, esophagitis presence not specified  Comment: Hx of GERD, well controlled on ranitidine 150 mg 1-2 times daily as needed. She has been decreasing use of this medication as she has made several dietary changes. Pt is requesting a refill today.   Plan:   - ranitidine (ZANTAC) 150 MG tablet; Take 1 tablet (150 mg) by mouth 2 times daily  Dispense: 60 tablet; Refill: 1    7. Mild intermittent asthma without complication  Comment: Pt with hx of mild intermittent asthma, well controlled with albuterol inhaler as needed. ACT today is 22. Pt is requesting a refill. Pt declines pneumococcal vaccine today. She plans to return to clinic after her upcoming travels to receive the vaccine.   Plan:   - albuterol (PROAIR HFA/PROVENTIL HFA/VENTOLIN HFA) 108 (90 Base) MCG/ACT inhaler; Inhale 2 puffs into the lungs every 6 hours  Dispense: 18 g; Refill: 1    8. Chronic rhinitis  Comment: Pt requesting refill of inhaled steroid nasal spray which she uses to manage seasonal allergies.   Plan:   - fluticasone (FLONASE) 50 MCG/ACT nasal spray; Spray 2 sprays  into both nostrils daily  Dispense: 9.9 mL; Refill: 1    Follow-up: Lab results pending, will follow-up as results indicate. Return to clinic if home BP readings are consistently >130/80.       COUNSELING:   Reviewed preventive health counseling, as reflected in patient instructions  Special attention given to:        Regular exercise       Healthy diet/nutrition       Vision screening       Immunizations    Declined: Pneumococcal due to Conscientious objector           Contraception       Osteoporosis Prevention/Bone Health       Safe sex practices/STD prevention       Colon cancer screening       HIV screeninx in teen years, 1x in adult years, and at intervals if high risk       ([place])menopause management       One time pneumovax for smokers    BP Screening:   Last 3 BP Readings:    BP Readings from Last 3 Encounters:   19 (!) 145/82   10/22/18 132/84   18 130/80       The following was recommended to the patient:  Re-screen within 4 weeks and recommend lifestyle modifications    Patient is a non-smoker. She has never used smokeless tobacco.  Body mass index is 28.92 kg/m .   Weight management plan: Discussed healthy diet and exercise guidelines    Counseling Resources:  ATP IV Guidelines  Pooled Cohorts Equation Calculator  Breast Cancer Risk Calculator  FRAX Risk Assessment  ICSI Preventive Guidelines  Dietary Guidelines for Americans,   [employer]'s MyPlate  ASA Prophylaxis  Lung CA Screening    LAZARA Gonzalez CNP on 2019 at 10:03 AM  [employer] [facility]

## 2019-08-07 NOTE — TELEPHONE ENCOUNTER
Calling to inform patient of lab results from her recent office visit. Lab results revealed elevated total cholesterol, triglycerides, LDL cholesterol, and non-HDL cholesterol.      ASCVD risk score is 2.3%, therefore do not recommend initiation of statin or ASA, however recommend patient return to clinic in 2 months for BP re-check to review her home readings. It will be important to keep her blood pressure under good control. I would also like to review ways in which she can work to decrease cholesterol through diet and exercise at her next follow-up visit. We will continue to monitor her cholesterol on an annual basis.     Of note, the patient's A1C was 6.0% (pre-diabetes). If we adjust ASCVD risk score to account for this, her risk increases to 4.4%.       The 10-year ASCVD risk score (Josekerry KIRKPATRICK Jr., et al., 2013) is: 2.3%    Values used to calculate the score:      Age: 53 years      Sex: Female      Is Non- : No      Diabetic: No      Tobacco smoker: No      Systolic Blood Pressure: 145 mmHg      Is BP treated: No      HDL Cholesterol: 53 mg/dL      Total Cholesterol: 228 mg/dL

## 2019-12-04 ENCOUNTER — TELEPHONE (OUTPATIENT)
Dept: FAMILY MEDICINE | Facility: CLINIC | Age: 54
End: 2019-12-04

## 2019-12-04 DIAGNOSIS — J45.20 MILD INTERMITTENT ASTHMA WITHOUT COMPLICATION: ICD-10-CM

## 2019-12-04 RX ORDER — ALBUTEROL SULFATE 90 UG/1
6 AEROSOL, METERED RESPIRATORY (INHALATION) EVERY 6 HOURS PRN
Qty: 18 INHALER | Refills: 3 | Status: SHIPPED | OUTPATIENT
Start: 2019-12-04 | End: 2019-12-05

## 2019-12-04 NOTE — TELEPHONE ENCOUNTER
VENTOLIN HFA 90 MCG INHALER    Last Written Prescription Date:  8/6/2019  Last Fill Quantity: 18 g,   # refills: 1  Last Office Visit : 8/6/2019  Future Office visit:  None  18 inhaler, 3 Refills sent to pharmacy 12/4/2019.    Karine Lowry RN  Central Triage Red Flags/Med Refills        Warnings Override History for albuterol (PROAIR HFA/PROVENTIL HFA/VENTOLIN HFA) 108 (90 Base) MCG/ACT inhaler [[id number]]     Overridden by Jinny Bradshaw APRN CNP on Aug 6, 2019 11:22 AM   Allergy/Contraindication   1. CHLORPHENIRAMINE-PHENYLEPHRINE [Level: Cross-sensitive Class Match] [Reason: Benefit outweighs risk]

## 2019-12-04 NOTE — TELEPHONE ENCOUNTER
[facility] phone call message- medication clarification/question:    Full Medication Name: albuterol (VENTOLIN HFA) 108 (90 Base) MCG/ACT inhaler   Dose: See Chart    Question/Clarification needed: Pharmacy is calling because its written to take 6 puffs every 6 hours but insurance will not cover that and will only cover the standard 1-2 puffs. They would like to know what the provider would like to do.     Pharmacy confirmed as     [facility] [id number] [facility] - [place], [place] - [address] [place] [address]  Phone: [phone] Fax: [phone]  : Yes    Please leave ONLY preferred pharmacy    OK to leave a message on voice mail? Yes    Primary language: English      needed? No    Call taken on December 4, 2019 at 5:01 PM by Yael Hollins CMA

## 2019-12-05 DIAGNOSIS — J45.20 MILD INTERMITTENT ASTHMA WITHOUT COMPLICATION: ICD-10-CM

## 2019-12-05 RX ORDER — ALBUTEROL SULFATE 90 UG/1
1-2 AEROSOL, METERED RESPIRATORY (INHALATION) EVERY 6 HOURS PRN
Qty: 1 INHALER | Refills: 3 | Status: SHIPPED | OUTPATIENT
Start: 2019-12-05 | End: 2020-10-23

## 2020-10-09 ENCOUNTER — TRANSFERRED RECORDS (OUTPATIENT)
Dept: HEALTH INFORMATION MANAGEMENT | Facility: CLINIC | Age: 55
End: 2020-10-09

## 2020-10-22 NOTE — PROGRESS NOTES
"  SUBJECTIVE:   Susie is a 55 year old female who presents to clinic today to establish care and for annual wellness exam.    # Mild Intermittent Asthma  - became symptomatic [place] moving [place]  - uses albuterol 2 puffs daily before her exercise, improves her exercise tolerance  - Exacerbations Requiring Steroids in Past Year: none ever  - Hospitalizations/Intubations?: no  - ACT score today: 21  - Identified Triggers: seasonal allergies, cat allergies. To a lesser extent exercise. URI's.   - Controller Medications: Loratadine during spring/summer/fall, usually doesn't need in Winter  - even during allergy season, taking Claritin, she didn't need to use albuterol more frequently     # History of Carcinoid  - diagnosed in 1997  - surgically removed along with 2 lobes of lung    # GERD  - indigestion, bloating, sensitive to some foods, particularly heavy foods  - initially was managed with zantac, stopped this a year ago after NDMA news. Had been very helpful  - managed with dietary changes, intermittent fasting for the past 9 months, helps with bloating  - mother had IBS and was similarly sensitive to diet  - symptoms worse after meal, eaten gluten at night --> bloating and palpable distension in abdomen  - no reflux but does get \"uneasiness\" in the night, feeling uncomfortable. Sitting upright or taking Tums helps.    # Elevated A1c  - 8/6/19 A1c 6.0%    # Skin Issue  - present for about a year and a half  - right shin growth  - not painful or itchy  - not growing or changing in appearance     # Health Maintenance  - BP:   BP Readings from Last 3 Encounters:   10/23/20 126/79   08/06/19 (!) 145/82   10/22/18 132/84   - Cholesterol:   Recent Labs   Lab Test 08/06/19  1038   CHOL 228*   HDL 53   *   TRIG 150*   - mother had CAD in early 50s, had BMS placed  - maternal grandfather had MI in 50's    The 10-year ASCVD risk score (Jose KIRKPATRICK Jr., et al., 2013) is: 2.1%    Values used to calculate the score:      Age: " 55 years      Sex: Female      Is Non- : No      Diabetic: No      Tobacco smoker: No      Systolic Blood Pressure: 126 mmHg      Is BP treated: No      HDL Cholesterol: 53 mg/dL      Total Cholesterol: 228 mg/dL    - Last Pap: 2018, normal  - Colonoscopy: has never had  - Mammogram: 4/23/19 BI-RADS 1. Planning mammogram this November with ob/gyn  - Exercise: daily 30-45 minutes aerobic   - no bleeding since February 2020, no hot flashes  - Mirena placed 2/15/19    Today's PHQ-2 Score:   PHQ-2 ( 1999 Pfizer) 10/23/2020 10/22/2018   Q1: Little interest or pleasure in doing things 0 0   Q2: Feeling down, depressed or hopeless 0 0   PHQ-2 Score 0 0     Review of Systems:   Constitutional - no fevers, chills, night sweats, unintentional weight loss/gain   Eyes - no vision concerns   Ears/Nose/Throat - no hearing concerns, no dysphagia/odynophagia   Cardiovascular - no chest pain, palpitations   Pulmonary - no current shortness of breath, wheezing, coughing   GI - as above    - no dysuria, polyuria, hematuria   Musculoskeletal - more low back pain, body aches since switching to working at home.  Integument - no rash   Neuro - no weakness, numbness, paresthesia   Heme - no easy bruising/bleeding   Endocrine - no polyuria, weight loss/gain, dry skin, excessive sweating, hair loss   Psychiatric - no feelings of depressed mood or anhedonia in past 2 weeks   Allergic/Immunologic - no history of anaphylaxis, no history of recurrent infections    Past Medical History:   Diagnosis Date     Carcinoid tumor     right lung, upper and middle lobe resected     Elevated hemoglobin A1c     8/6/19 A1c 6.0%     Fibroid (bleeding) (uterine)     chonic ongoing     Uncomplicated asthma      Viral hepatitis A without hepatic coma 1981     Past Surgical History:   Procedure Laterality Date     BIOPSY Bilateral     breast biopsies for nipple discharge     DILATION AND CURETTAGE  10/2018    for menorrhagia related to  "fibroids. Helped     GYN SURGERY  1995     section     THORACIC SURGERY      Carcinoid tumor removed      Family History   Problem Relation Age of Onset     Parkinsonism Mother      Coronary Artery Disease Mother 53     Cancer Father 75        tongue cancer     Skin Cancer Father      Coronary Artery Disease Maternal Grandfather 55     Coronary Artery Disease Paternal Grandfather      Seasonal/Environmental Allergies Sister      Breast Cancer Paternal Aunt      Breast Cancer Paternal Cousin      Ovarian Cancer No family hx of      Colon Cancer No family hx of      Diabetes No family hx of      Social History     Tobacco Use     Smoking status: Never Smoker     Smokeless tobacco: Never Used   Substance Use Topics     Alcohol use: Yes     Frequency: 2-3 times a week     Drinks per session: 1 or 2     Binge frequency: Never     Comment: 2-3 glasses of wine per week     Drug use: No     Social History     Social History Narrative    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      I have reviewed the patient's past medical, surgical, family, and social history.     OBJECTIVE:   /79 (BP Location: Right arm, Patient Position: Sitting, Cuff Size: Adult Regular)   Pulse 83   Temp 97.1  F (36.2  C) (Skin)   Resp 14   Ht 1.624 m (5' 3.94\")   Wt 71.9 kg (158 lb 8 oz)   LMP 2020   SpO2 97%   Breastfeeding No   BMI 27.26 kg/m      Constitutional: well-appearing, appears stated " age  Eyes: conjunctivae without erythema, sclera anicteric. Pupils equal, round, and reactive to light.   ENT: oropharynx clear, TM grey bilateral  Cardiac: regular rate and rhythm, normal S1/S2, no murmur/rubs/gallops  Respiratory: lungs clear to auscultation bilaterally, normal work of breathing, no wheezes/crackles  GI: abdomen soft, non-tender, non-distended  Extremities: warm and well perfused, radial pulses 2+ and equal, cap refill brisk.  Lymph: no cervical or supraclavicular lymphadenopathy  Skin: no rashes, lesions, or wounds  Psych: affect is full and appropriate, speech is fluent and non-pressured    ASSESSMENT AND PLAN:     COUNSELING:   Reviewed preventive health counseling, as reflected in patient instructions       Regular exercise       Healthy diet/nutrition       Colon cancer screening      (Z01.419) Well woman exam  (primary encounter diagnosis)  Comment: Age appropriate screening and preventive services provided. BP good. Depression screening negative. Had elevated A1c last year - update today. Sending for first colonoscopy. Up to date on Pap.Plan: Hemoglobin A1c ([place]), Lipid Panel ([place])          (D64.9) Anemia, unspecified type  Comment: Update labs today.   Plan: CBC with Plt ([facility]), Ferritin,          (Z12.11) Screen for colon cancer  Comment:   Plan: GASTROENTEROLOGY ADULT REF PROCEDURE ONLY          (K21.9) Gastroesophageal reflux disease without esophagitis  Comment: Bloating after eating and discomfort when flaying flat, both relieved with ranitidine and/or Tums. Restart H2 blocker.   Plan:  famotidine  (PEPCID) 20 MG tablet    (J45.30) Mild persistent asthma without complication  Comment: Stable. Daily symptoms only because exercises daily. Discussed updated MARIE guidelines that recommend LABA/ICS as rescue inhaler instead of URIEL. Given how well controlled her symptoms are, even during illnesses, we will not make changes at this time. Pneumovax today. Updated asthma  action plan.   Plan: Asthma Action Plan (AAP), Pneumococcal vaccine         23 valent PPSV23  (Pneumovax) [99021], VACCINE         ADMINISTRATION, INITIAL, albuterol (VENTOLIN         HFA) 108 (90 Base) MCG/ACT inhaler          (Z86.39) History of vitamin D deficiency  Comment: Update labs today.   Plan: Vitamin D Deficiency            Lee Dong MD  [facility]  10/23/2020, 9:14 AM

## 2020-10-23 ENCOUNTER — OFFICE VISIT (OUTPATIENT)
Dept: FAMILY MEDICINE | Facility: CLINIC | Age: 55
End: 2020-10-23
Payer: COMMERCIAL

## 2020-10-23 ENCOUNTER — TRANSFERRED RECORDS (OUTPATIENT)
Dept: HEALTH INFORMATION MANAGEMENT | Facility: CLINIC | Age: 55
End: 2020-10-23

## 2020-10-23 VITALS
DIASTOLIC BLOOD PRESSURE: 79 MMHG | HEIGHT: 64 IN | RESPIRATION RATE: 14 BRPM | SYSTOLIC BLOOD PRESSURE: 126 MMHG | OXYGEN SATURATION: 97 % | BODY MASS INDEX: 27.06 KG/M2 | WEIGHT: 158.5 LBS | HEART RATE: 83 BPM | TEMPERATURE: 97.1 F

## 2020-10-23 DIAGNOSIS — D64.9 ANEMIA, UNSPECIFIED TYPE: ICD-10-CM

## 2020-10-23 DIAGNOSIS — Z12.11 SCREEN FOR COLON CANCER: ICD-10-CM

## 2020-10-23 DIAGNOSIS — J45.30 MILD PERSISTENT ASTHMA WITHOUT COMPLICATION: Chronic | ICD-10-CM

## 2020-10-23 DIAGNOSIS — Z86.39 HISTORY OF VITAMIN D DEFICIENCY: ICD-10-CM

## 2020-10-23 DIAGNOSIS — K21.9 GASTROESOPHAGEAL REFLUX DISEASE WITHOUT ESOPHAGITIS: ICD-10-CM

## 2020-10-23 DIAGNOSIS — Z01.419 WELL WOMAN EXAM: Primary | ICD-10-CM

## 2020-10-23 PROBLEM — Z30.431 SURVEILLANCE OF INTRAUTERINE CONTRACEPTIVE DEVICE: Chronic | Status: ACTIVE | Noted: 2020-10-23

## 2020-10-23 PROBLEM — N94.6 DYSMENORRHEA: Status: RESOLVED | Noted: 2017-04-11 | Resolved: 2020-10-23

## 2020-10-23 PROBLEM — N92.0 MENORRHAGIA: Status: RESOLVED | Noted: 2017-04-11 | Resolved: 2020-10-23

## 2020-10-23 PROBLEM — J31.0 CHRONIC RHINITIS: Status: RESOLVED | Noted: 2017-04-11 | Resolved: 2020-10-23

## 2020-10-23 PROBLEM — J30.81 CAT ALLERGIES: Chronic | Status: ACTIVE | Noted: 2020-10-23

## 2020-10-23 LAB
CHOLEST SERPL-MCNC: 267 MG/DL (ref 0–200)
CHOLEST/HDLC SERPL: 4 {RATIO} (ref 0–5)
ERYTHROCYTE [DISTWIDTH] IN BLOOD BY AUTOMATED COUNT: 12.2 %
FASTING SPECIMEN: YES
FERRITIN SERPL-MCNC: 64 NG/ML (ref 8–252)
HBA1C MFR BLD: 5.7 % (ref 4.1–5.7)
HCT VFR BLD AUTO: 41.4 % (ref 35–47)
HDLC SERPL-MCNC: 66 MG/DL
HEMOGLOBIN: 13.7 G/DL (ref 11.7–15.7)
LDLC SERPL CALC-MCNC: 164 MG/DL (ref 0–129)
MCH RBC QN AUTO: 30.1 PG (ref 26.5–35)
MCHC RBC AUTO-ENTMCNC: 33.1 G/DL (ref 32–36)
MCV RBC AUTO: 91 FL (ref 78–100)
PLATELET # BLD AUTO: 267 K/UL (ref 150–450)
RBC # BLD AUTO: 4.55 M/UL (ref 3.8–5.2)
TRIGL SERPL-MCNC: 185 MG/DL (ref 0–150)
VLDL-CHOLESTEROL: 37 (ref 7–32)
WBC # BLD AUTO: 6.1 K/UL (ref 4–11)

## 2020-10-23 RX ORDER — ALBUTEROL SULFATE 90 UG/1
1-2 AEROSOL, METERED RESPIRATORY (INHALATION) EVERY 6 HOURS PRN
Qty: 3 INHALER | Refills: 11 | Status: SHIPPED | OUTPATIENT
Start: 2020-10-23 | End: 2021-10-25

## 2020-10-23 RX ORDER — FAMOTIDINE 20 MG/1
20 TABLET, FILM COATED ORAL 2 TIMES DAILY PRN
Qty: 90 TABLET | Refills: 5 | Status: SHIPPED | OUTPATIENT
Start: 2020-10-23 | End: 2021-12-10

## 2020-10-23 SDOH — HEALTH STABILITY: MENTAL HEALTH: HOW OFTEN DO YOU HAVE 6 OR MORE DRINKS ON ONE OCCASION?: NEVER

## 2020-10-23 SDOH — HEALTH STABILITY: MENTAL HEALTH: HOW MANY STANDARD DRINKS CONTAINING ALCOHOL DO YOU HAVE ON A TYPICAL DAY?: 1 OR 2

## 2020-10-23 SDOH — HEALTH STABILITY: MENTAL HEALTH: HOW OFTEN DO YOU HAVE A DRINK CONTAINING ALCOHOL?: 2-3 TIMES A WEEK

## 2020-10-23 ASSESSMENT — ASTHMA QUESTIONNAIRES
QUESTION_5 LAST FOUR WEEKS HOW WOULD YOU RATE YOUR ASTHMA CONTROL: WELL CONTROLLED
QUESTION_3 LAST FOUR WEEKS HOW OFTEN DID YOUR ASTHMA SYMPTOMS (WHEEZING, COUGHING, SHORTNESS OF BREATH, CHEST TIGHTNESS OR PAIN) WAKE YOU UP AT NIGHT OR EARLIER THAN USUAL IN THE MORNING: NOT AT ALL
QUESTION_4 LAST FOUR WEEKS HOW OFTEN HAVE YOU USED YOUR RESCUE INHALER OR NEBULIZER MEDICATION (SUCH AS ALBUTEROL): ONE OR TWO TIMES PER DAY
ACUTE_EXACERBATION_TODAY: NO
ACT_TOTALSCORE: 21
QUESTION_1 LAST FOUR WEEKS HOW MUCH OF THE TIME DID YOUR ASTHMA KEEP YOU FROM GETTING AS MUCH DONE AT WORK, SCHOOL OR AT HOME: NONE OF THE TIME
QUESTION_2 LAST FOUR WEEKS HOW OFTEN HAVE YOU HAD SHORTNESS OF BREATH: NOT AT ALL

## 2020-10-23 ASSESSMENT — ANXIETY QUESTIONNAIRES
3. WORRYING TOO MUCH ABOUT DIFFERENT THINGS: NOT AT ALL
5. BEING SO RESTLESS THAT IT IS HARD TO SIT STILL: NOT AT ALL
6. BECOMING EASILY ANNOYED OR IRRITABLE: NOT AT ALL
GAD7 TOTAL SCORE: 0
IF YOU CHECKED OFF ANY PROBLEMS ON THIS QUESTIONNAIRE, HOW DIFFICULT HAVE THESE PROBLEMS MADE IT FOR YOU TO DO YOUR WORK, TAKE CARE OF THINGS AT HOME, OR GET ALONG WITH OTHER PEOPLE: NOT DIFFICULT AT ALL
1. FEELING NERVOUS, ANXIOUS, OR ON EDGE: NOT AT ALL
7. FEELING AFRAID AS IF SOMETHING AWFUL MIGHT HAPPEN: NOT AT ALL
2. NOT BEING ABLE TO STOP OR CONTROL WORRYING: NOT AT ALL

## 2020-10-23 ASSESSMENT — MIFFLIN-ST. JEOR: SCORE: 1302.95

## 2020-10-23 ASSESSMENT — PATIENT HEALTH QUESTIONNAIRE - PHQ9
SUM OF ALL RESPONSES TO PHQ QUESTIONS 1-9: 0
5. POOR APPETITE OR OVEREATING: NOT AT ALL

## 2020-10-23 NOTE — LETTER
Susie Dupont  [address]  [facility] [address]    [id number]    October 23, 2020    Dear Susie:    In an effort to improve care for our asthmatic patients we feel it is important to provide everyone with a written plan to help in taking care of their asthma. Experts in the field of asthma care have shown that having a written Asthma Action Plan can significantly reduce the number of acute asthma flare-ups, unnecessary Emergency Room visits, and lost days from school or work.     Enclosed with this letter is an Asthma Action Plan designed for you based on your severity of asthma as determined by the review of your records.     If you have not been seen recently, your asthma severity and treatment plan may need updating. Please contact us to schedule a follow-up visit at your convenience.     Thank you for allowing me to participate in your care. If you have any further questions or problems, please contact me at [phone].     Sincerely,        Sharda Rose [address] [address]  [phone]                      Asthma Action Plan For: Susie Dupont        10/23/2020   [facility] [place] [facility]  [address] [place] [id number]  [phone]    PATIENT ASTHMA SEVERITY:  Mild Persistent      =============== GREEN ZONE ==============  (doing well)  Symptoms     *Breathing is good     *No cough or wheeze     *Can work and play without cough or wheeze     *Sleep at night without cough or wheeze    Control Medications to be taken regularly to prevent asthma symptoms.     Flovent 2 puffs twice a day     Quick Relief Medications to be used when sick or having asthma symptoms.  Albuterol 2 puffs every four hours as needed     For those with Exercise induced Symptoms:  *Albuterol 2 puffs 5 TO 60 minutes before exercise as needed, based on your exercise symptoms                        ============== YELLOW ZONE =========== (getting worse)  Symptoms     *Some problems breathing     *Cough, wheeze or chest tight     *Problems working or playing     *Waking at night with cough or wheezing    Continue Taking your Green Zone Mediciation  Take your quick relief medicine now - 2 puffs every 20 minutes for up to 1 hour         IF your symptoms return to the Green Zone after one hour of the quick relief treatment, THEN:  Take the quick relief medicine every 4-6 hours for 1-2 days.       IF your symptoms DO NOT return to the Green Zone after one hour of the quick relief treatment, THEN:    Take the quick relief medicine every 2 hours for up to 12 hours.      If unrelieved, call your clinic.                                                                  CALL THE OFFICE IF:     *You are having problems staying in the green zone     *If unsure of any of the above                                                          =============== RED ZONE =============  (EMERGENCY!)  Symptoms     *Lots of problems breathing     *Cannot work or play     *Getting worse instead of better     *Medicine is not helping    Take your quick relief medicine now -- 4 puffs now!  Continue your control medicines.  Call the office immediately!                                          Call an ambulance immediately if the following danger signs are present:     *You are worried about how you will get through the next 30 minutes     *Trouble walking/talking due to shortness of breath     *Lips or fingernails are blue    Go to the hospital or call for an ambulance (911) IF:     *Still in the red zone after 15 minutes AND     *You have not been able to reach your physician/office for help           Once your symptoms return to the YELLOW ZONE continue with the Yellow Zone instructions.

## 2020-10-23 NOTE — NURSING NOTE
Prior to immunization administration, verified patients identity using patient s name and date of birth. Please see Immunization Activity for additional information.     Screening Questionnaire for Adult Immunization    Are you sick today?   No   Do you have allergies to medications, food, a vaccine component or latex?   No   Have you ever had a serious reaction after receiving a vaccination?   No   Do you have a long-term health problem with heart, lung, kidney, or metabolic disease (e.g., diabetes), asthma, a blood disorder, no spleen, complement component deficiency, a cochlear implant, or a spinal fluid leak?  Are you on long-term aspirin therapy?   No   Do you have cancer, leukemia, HIV/AIDS, or any other immune system problem?   No   Do you have a parent, brother, or sister with an immune system problem?   No   In the past 3 months, have you taken medications that affect  your immune system, such as prednisone, other steroids, or anticancer drugs; drugs for the treatment of rheumatoid arthritis, Crohn s disease, or psoriasis; or have you had radiation treatments?   No   Have you had a seizure, or a brain or other nervous system problem?   No   During the past year, have you received a transfusion of blood or blood    products, or been given immune (gamma) globulin or antiviral drug?   No   For women: Are you pregnant or is there a chance you could become       pregnant during the next month?   No   Have you received any vaccinations in the past 4 weeks?   No     Immunization questionnaire answers were all negative.        Per orders of Dr. Dong, injection of PPSV23 given by Sharda Rose CMA. Patient instructed to remain in clinic for 15 minutes afterwards, and to report any adverse reaction to me immediately.       Screening performed by Sharda Rose CMA on 10/23/2020 at 10:12 AM.

## 2020-10-23 NOTE — PATIENT INSTRUCTIONS
My Asthma Action Plan    Name: Susie Dupont   YOB: 1965  Date: 10/23/2020   My doctor: Lee Dong MD   My clinic: [facility]        My Rescue Medicine:   Albuterol inhaler (Proair/Ventolin/Proventil HFA)  2-4 puffs EVERY 4 HOURS as needed. Use a spacer if recommended by your provider.   My Asthma Severity:   Intermittent / Exercise Induced  Know your asthma triggers: upper respiratory infections, dust mites, pollens, animal dander and exercise or sports             GREEN ZONE   Good Control    I feel good    No cough or wheeze    Can work, sleep and play without asthma symptoms       Take your allergy medications every day during the appropriate seasons.     1. If exercise triggers your asthma, take your rescue medication    15 minutes before exercise or sports, and    During exercise if you have asthma symptoms  2. Spacer to use with inhaler: If you have a spacer, make sure to use it with your inhaler             YELLOW ZONE Getting Worse  I have ANY of these:    I do not feel good    Cough or wheeze    Chest feels tight    Wake up at night   1. Keep taking your Green Zone medications  2. Start taking your rescue medicine:    every 20 minutes for up to 1 hour. Then every 4 hours for 24-48 hours.  3. If you stay in the Yellow Zone for more than 12-24 hours, contact your doctor.  4. If you do not return to the Green Zone in 12-24 hours or you get worse, start taking your oral steroid medicine if prescribed by your provider.           RED ZONE Medical Alert - Get Help  I have ANY of these:    I feel awful    Medicine is not helping    Breathing getting harder    Trouble walking or talking    Nose opens wide to breathe       1. Take your rescue medicine NOW  2. If your provider has prescribed an oral steroid medicine, start taking it NOW  3. Call your doctor NOW  4. If you are still in the Red Zone after 20 minutes and you have not reached your doctor:    Take your rescue medicine again  and    Call 911 or go to the emergency room right away    See your regular doctor within 2 weeks of an Emergency Room or Urgent Care visit for follow-up treatment.          Annual Reminders:  Meet with Asthma Educator,  Flu Shot in the Fall, consider Pneumonia Vaccination for patients with asthma (aged 19 and older).    Pharmacy:    [id number] - [place], [place] - [address]  [id number] [place], [place] - [address]  [id number] - [place], [place] - [address] [facility]    Electronically signed by Lee Dong MD   Date: 10/23/20                    Asthma Triggers  How To Control Things That Make Your Asthma Worse    Triggers are things that make your asthma worse.  Look at the list below to help you find your triggers and   what you can do about them. You can help prevent asthma flare-ups by staying away from your triggers.      Trigger                                                          What you can do   Cigarette Smoke  Tobacco smoke can make asthma worse. Do not allow smoking in your home, car or around you.  Be sure no one smokes at a child s day care or school.  If you smoke, ask your health care provider for ways to help you quit.  Ask family members to quit too.  Ask your health care provider for a referral to Quit Plan to help you quit smoking, or call 1-215-582-PLAN.     Colds, Flu, Bronchitis  These are common triggers of asthma. Wash your hands often.  Don t touch your eyes, nose or mouth.  Get a flu shot every year.     Dust Mites  These are tiny bugs that live in cloth or carpet. They are too small to see. Wash sheets and blankets in hot water every week.   Encase pillows and mattress in dust mite proof covers.  Avoid having carpet if you can. If you have carpet, vacuum weekly.   Use a dust mask and HEPA vacuum.   Pollen and Outdoor Mold  Some people are allergic to trees, grass, or weed pollen, or molds. Try to keep your windows closed.  Limit time out doors when  pollen count is high.   Ask you health care provider about taking medicine during allergy season.     Animal Dander  Some people are allergic to skin flakes, urine or saliva from pets with fur or feathers. Keep pets with fur or feathers out of your home.    If you can t keep the pet outdoors, then keep the pet out of your bedroom.  Keep the bedroom door closed.  Keep pets off cloth furniture and away from stuffed toys.     Mice, Rats, and Cockroaches  Some people are allergic to the waste from these pests.   Cover food and garbage.  Clean up spills and food crumbs.  Store grease in the refrigerator.   Keep food out of the bedroom.   Indoor Mold  This can be a trigger if your home has high moisture. Fix leaking faucets, pipes, or other sources of water.   Clean moldy surfaces.  Dehumidify basement if it is damp and smelly.   Smoke, Strong Odors, and Sprays  These can reduce air quality. Stay away from strong odors and sprays, such as perfume, powder, hair spray, paints, smoke incense, paint, cleaning products, candles and new carpet.   Exercise or Sports  Some people with asthma have this trigger. Be active!  Ask your doctor about taking medicine before sports or exercise to prevent symptoms.    Warm up for 5-10 minutes before and after sports or exercise.     Other Triggers of Asthma  Cold air:  Cover your nose and mouth with a scarf.  Sometimes laughing or crying can be a trigger.  Some medicines and food can trigger asthma.

## 2020-10-23 NOTE — NURSING NOTE
"55 year old  Chief Complaint   Patient presents with     Physical     Referral     [facility] for Colonoscopy       Blood pressure 126/79, pulse 83, temperature 97.1  F (36.2  C), temperature source Skin, resp. rate 14, height 1.624 m (5' 3.94\"), weight 71.9 kg (158 lb 8 oz), last menstrual period 02/01/2020, SpO2 97 %, not currently breastfeeding. Body mass index is 27.26 kg/m .  Patient Active Problem List   Diagnosis     Mild persistent asthma without complication     Menorrhagia     Chronic rhinitis     Dysmenorrhea     Carcinoid tumor     Gastroesophageal reflux disease, esophagitis presence not specified       Wt Readings from Last 2 Encounters:   10/23/20 71.9 kg (158 lb 8 oz)   08/06/19 76.7 kg (169 lb)     BP Readings from Last 3 Encounters:   10/23/20 126/79   08/06/19 (!) 145/82   10/22/18 132/84         Current Outpatient Medications   Medication     albuterol (VENTOLIN HFA) 108 (90 Base) MCG/ACT inhaler     fluticasone (FLONASE) 50 MCG/ACT nasal spray     loratadine (CLARITIN) 10 MG tablet     ranitidine (ZANTAC) 150 MG tablet     tranexamic acid (LYSTEDA) 650 MG tablet     No current facility-administered medications for this visit.        Social History     Tobacco Use     Smoking status: Never Smoker     Smokeless tobacco: Never Used   Substance Use Topics     Alcohol use: Yes     Frequency: 2-3 times a week     Drinks per session: 1 or 2     Binge frequency: Never     Comment: 2-3 glasses of wine per week     Drug use: No       Health Maintenance Due   Topic Date Due     Pneumococcal Vaccine: Pediatrics (0 to 5 Years) and At-Risk Patients (6 to 64 Years) (1 of 1 - PPSV23) 08/08/1972     COLORECTAL CANCER SCREENING  08/08/1976     ASTHMA CONTROL TEST  02/06/2020     PREVENTIVE CARE VISIT  08/06/2020     ASTHMA ACTION PLAN  08/06/2020       No results found for: PAP      October 23, 2020 8:42 AM    "

## 2020-10-24 ASSESSMENT — ASTHMA QUESTIONNAIRES: ACT_TOTALSCORE: 21

## 2020-10-24 ASSESSMENT — ANXIETY QUESTIONNAIRES: GAD7 TOTAL SCORE: 0

## 2020-10-26 LAB — DEPRECATED CALCIDIOL+CALCIFEROL SERPL-MC: 19 UG/L (ref 20–75)

## 2020-11-08 LAB — NEGATIVE: NORMAL

## 2020-11-18 ENCOUNTER — TRANSFERRED RECORDS (OUTPATIENT)
Dept: HEALTH INFORMATION MANAGEMENT | Facility: CLINIC | Age: 55
End: 2020-11-18

## 2020-11-18 ENCOUNTER — HOSPITAL ENCOUNTER (OUTPATIENT)
Dept: MAMMOGRAPHY | Facility: CLINIC | Age: 55
Discharge: HOME | End: 2020-11-18
Attending: OBSTETRICS & GYNECOLOGY

## 2020-11-18 DIAGNOSIS — Z12.31 SCREENING MAMMOGRAM, ENCOUNTER FOR: ICD-10-CM

## 2021-05-25 ENCOUNTER — RECORDS - HEALTHEAST (OUTPATIENT)
Dept: ADMINISTRATIVE | Facility: CLINIC | Age: 56
End: 2021-05-25

## 2021-05-26 ENCOUNTER — RECORDS - HEALTHEAST (OUTPATIENT)
Dept: ADMINISTRATIVE | Facility: CLINIC | Age: 56
End: 2021-05-26

## 2021-05-28 ENCOUNTER — RECORDS - HEALTHEAST (OUTPATIENT)
Dept: ADMINISTRATIVE | Facility: CLINIC | Age: 56
End: 2021-05-28

## 2021-05-31 VITALS — WEIGHT: 166 LBS | BODY MASS INDEX: 28.49 KG/M2

## 2021-06-02 VITALS
BODY MASS INDEX: 28.34 KG/M2 | WEIGHT: 166 LBS | BODY MASS INDEX: 28.34 KG/M2 | WEIGHT: 166 LBS | HEIGHT: 64 IN | HEIGHT: 64 IN

## 2021-06-11 NOTE — PROGRESS NOTES
This is a 51 y.o. woman whom asked to see by Marine Woods MD for evaluation of left nipple discharge. She describes it as clear. This started about 4 months ago. It happens spontaneously.  She has no palpable mass. She had a mammogram and ultrasound done that shows 2 filling defects in a lateral duct.  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  She then developed a suspicious mass after that and developed mastitis.  It turned into quite a long drawn out ordeal with wound packing.  She has had no problems then until recently.    Family History: She has a moderate family history of breast cancer.  A cousin and an aunt.    PMH:      Medications:    Current Outpatient Prescriptions:      albuterol (PROAIR HFA;PROVENTIL HFA;VENTOLIN HFA) 90 mcg/actuation inhaler, Inhale 2 puffs., Disp: , Rfl:      fluticasone (FLONASE) 50 mcg/actuation nasal spray, 2 sprays into each nostril., Disp: , Rfl:      fluticasone (FLOVENT HFA) 220 mcg/actuation inhaler, Inhale 2 puffs., Disp: , Rfl:   Allergies:  Allergies   Allergen Reactions     Pollen Shortness Of Breath     Apple Rash     Kiwi Rash       Social History:  She does not smoke.    Review of systems is otherwise negative for full 12 point review of systems.    Physical exam:  /84 (Patient Site: Left Arm, Patient Position: Sitting, Cuff Size: Adult Regular)  Pulse 74  Wt 166 lb (75.3 kg)  LMP 05/30/2017  SpO2 98%  General: alert, appears stated age and cooperative  Lungs: clear to auscultation bilaterally  CV: regular rate and rhythm, S1, S2 normal, no murmur, click, rub or gallop  Breasts: No palpable mass in either breast.  There is clear drainage from the left nipple with minimal pressure on the lateral aspect of the areola.  I am unable to get any discharge from her right nipple.  Axilla: No axillary adenopathy.    Reviewed her mammograms and ultrasound.    Impression:   Left nipple discharge.  I recommend subareolar duct excision. Typically this is caused by papillomas. Explained to the patient that these are typically benign but are considered a pre-malignant condition so excision should be done.  Particularly since she has a history of papillomatosis these do need to be biopsied.    Plan: Left subareolar duct excision. This is typically an outpatient procedure done under local anesthetic with IV sedation.  All questions were answered.

## 2021-06-21 NOTE — ANESTHESIA PREPROCEDURE EVALUATION
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  Doing well from a functional status.                         Cardiovascular   Exercise tolerance: > or = 4 METS  Rhythm: regular  Rate: normal,         Neuro/Psych      Endo/Other       GI/Hepatic/Renal            Dental - normal exam                        Anesthesia Plan  Planned anesthetic: MAC    ASA 2   Induction: intravenous   Anesthetic plan and risks discussed with: patient and spouse  Anesthesia plan special considerations: antiemetics,   Post-op plan: routine recovery

## 2021-06-21 NOTE — OP NOTE
Operative report    date of service 10/30/2018    Preoperative diagnosis: Intracavitary fibroid, menorrhagia, lost IUD    Postoperative diagnoses: Same    Procedure: Hysteroscopy, removal of IUD, resection of intracavitary fibroid    Findings: The uterus sounded to 9-1/2 cm.  It contained a 3-4 cm fibroid attached to the mid fundus.  There were several large blood vessels over this fibroid.  The cavity was otherwise normal.  The IUD was noted behind this fibroid with the strings curled up.  The IUD was somewhat askew in a more transverse orientation then longitudinal.    Technique: After the patient was comfortable having been given sedative and analgesic medication she was prepped and draped in the dorsolithotomy position.  After timeout by the team the procedure was begun.  A vaginal speculum was placed in the anterior lip of the cervix injected with half percent Marcaine plain.  The anterior lip was grasped with a single-tooth tenaculum.  A circumferential paracervical block was given using 10 cc of the 0.5% Marcaine plain.  The uterus was sounded to 9-1/2 cm and easily dilated to #7 Hegar.  The Myosure scope was placed with findings as above.  A small grasper was placed through the scope and the IUD strings were grasped and the IUD removed with the entire scope apparatus.  The scope was then replaced.  The heavy resecting tool was then placed and the fibroid resected.  It was very firm and periodically there was enough bleeding once vessels were encountered to have to wait for the area to clear.  There also was a lot of fluid coming back from the cervix so 2 single-tooth tenaculums were placed tightly across the rest to reduce fluid loss.  Visualization was generally good.  Once about three quarters of the fibroid was resected it was difficult to get more tissue.  This might have been from dulling of the resecting blades.  The upper part of the fibroid was much softer and this was resected which ended up freeing  the fibroid into the cavity.  The amount of fibroid left was somewhere between 1-2 cm and without any surface blood vessels so this must of been from the interior of the fibroid as the pharmacist.  I then tried several times with various graspers after removing the scope to go through the cervix and grasp the remaining tissue for removal but could not get a firm enough purchase on it to remove it.  Decision was made to stop efforts at that at this and not put in a new IUD since there was a free-floating fibroid fragment left in the cavity.  Bleeding was minimal and the cavity was normalized by resection of the fibroid.  Sponge and needle counts reported as correct the patient was awakened and taken recovery in satisfactory condition.    Anesthesia: Local with sedation    Estimated blood loss: Minimal fluid loss was high but there was much lost on the floor and an accurate accounting of the hysteroscopic fluid will be difficult.    Tissues removed: Fibroid and endometrial curettings    Complications: None    Signed: Marine Woods

## 2021-06-21 NOTE — ANESTHESIA POSTPROCEDURE EVALUATION
Patient: Susie Dupont  HYSTEROSCOPY, WITH DILATION AND CURETTAGE, MYOMECTOMYINTRAUTERINE DEVICE REMOVAL  Anesthesia type: MAC    Patient location: PACU  Last vitals:   Vitals:    10/30/18 1200   BP: 144/74   Pulse: 70   Resp: 16   Temp:    SpO2: 94%     Post vital signs: stable  Level of consciousness: awake and responds to simple questions  Post-anesthesia pain: pain controlled  Post-anesthesia nausea and vomiting: no  Pulmonary: unassisted, return to baseline  Cardiovascular: stable and blood pressure at baseline  Hydration: adequate  Anesthetic events: no    QCDR Measures:  ASA# 11 - [place]-op Cardiac Arrest: ASA11B - Patient did NOT experience unanticipated cardiac arrest  ASA# 12 - [place]-op Mortality Rate: ASA12B - Patient did NOT die  ASA# 13 - PACU Re-Intubation Rate: NA - No ETT / LMA used for case  ASA# 10 - Composite Anes Safety: ASA10A - No serious adverse event    Additional Notes:

## 2021-06-21 NOTE — ANESTHESIA CARE TRANSFER NOTE
Last vitals:   Vitals:    10/30/18 1024   BP: 148/90   Pulse: 70   Resp: 16   Temp: (!) 35.7  C (96.3  F)   SpO2: 100%     Patient's level of consciousness is drowsy  Spontaneous respirations: yes  Maintains airway independently: yes  Dentition unchanged: yes  Oropharynx: oropharynx clear of all foreign objects    QCDR Measures:  ASA# 20 - Surgical Safety Checklist: WHO surgical safety checklist completed prior to induction  PQRS# 430 - Adult PONV Prevention: 4558F - Pt received => 2 anti-emetic agents (different classes) preop & intraop  ASA# 8 - Peds PONV Prevention: NA - Not pediatric patient, not GA or 2 or more risk factors NOT present  PQRS# 424 - [place]ma-op Temp Management: 4559F - At least one body temp DOCUMENTED => 35.5C or 95.9F within required timeframe  PQRS# 426 - PACU Transfer Protocol: - Transfer of care checklist used  ASA# 14 - Acute Post-op Pain: ASA14B - Patient did NOT experience pain >= 7 out of 10

## 2021-07-13 ENCOUNTER — RECORDS - HEALTHEAST (OUTPATIENT)
Dept: ADMINISTRATIVE | Facility: CLINIC | Age: 56
End: 2021-07-13

## 2021-07-21 ENCOUNTER — RECORDS - HEALTHEAST (OUTPATIENT)
Dept: ADMINISTRATIVE | Facility: CLINIC | Age: 56
End: 2021-07-21

## 2021-09-12 PROBLEM — Z12.11 SCREENING FOR COLON CANCER: Chronic | Status: ACTIVE | Noted: 2021-09-12

## 2021-10-09 ENCOUNTER — HEALTH MAINTENANCE LETTER (OUTPATIENT)
Age: 56
End: 2021-10-09

## 2021-10-15 ENCOUNTER — TRANSFERRED RECORDS (OUTPATIENT)
Dept: HEALTH INFORMATION MANAGEMENT | Facility: CLINIC | Age: 56
End: 2021-10-15

## 2021-10-24 NOTE — PROGRESS NOTES
ASSESSMENT AND PLAN:     COUNSELING:   Reviewed preventive health counseling, as reflected in patient instructions       Regular exercise       Healthy diet/nutrition    (Z01.419) Well woman exam  (primary encounter diagnosis)  Comment: Age apropriate screening and preventive services provided.   Lipids elevated. ASCVD risk estimated at 3.2% but that is not considering her mother's history of premature CAD. Working lifestyle changes to improve lipids. Recommended follow up with our dietician Jennie Bucio.   Plan to recheck lipids in 6 months.  Plan: Lipid panel reflex to direct LDL Fasting,         Mammogram - routine screening          (J45.30) Mild persistent asthma without complication  Comment: Stable, well controlled. Updated asthma action plan and send in [facility] [facility].   Plan: albuterol (VENTOLIN HFA) 108 (90 Base) MCG/ACT         inhaler          (R01.1) Systolic murmur  Comment: Newly heard today. Check US to evaluate for cause. Asymptomatic.   Plan: Echocardiogram Complete          (R03.0) Elevated BP without diagnosis of hypertension  Comment: Daughter who is a medical student just purchased a home BP machine. Will have them check once a day for 7 days and message me with numbers.   Plan:     (K42.9) Umbilical hernia without obstruction and without gangrene  Comment: History and exam consistent with small umbilical hernia. Easily reducible. No intervention planned unless becomes painful.   Plan:     (R14.0) Abdominal bloating  Comment: Trial of low FODMAP diet.   Plan:     (K21.9) Gastroesophageal reflux disease without esophagitis  Comment: Stable, well controlled. Try moving famotidine to PRN.   Plan:     (M25.551) Hip pain, right  Comment: Only bothersome in the first 10-15 minutes of the morning after getting out of bed. Try nighttime stretching. If ineffective, follow up with Dr. Vaughn for additional evaluation.   Plan:     Lee Dong MD  [facility]  10/25/2021, 3:03  "PM      SUBJECTIVE:   Susie Dupont is a 56 year old female who presents to clinic today for an annual wellness exam.    # Mild Intermittent Asthma  - became symptomatic after moving to   - uses albuterol 2 puffs daily before her exercise, improves her exercise tolerance  - has forgotten sometimes and hasn't notice a difference  - Exacerbations Requiring Steroids in Past Year: none ever  - Hospitalizations/Intubations?: no  - Identified Triggers: seasonal allergies, cat allergies. To a lesser extent exercise. URI's.   - Controller Medications: Loratadine during spring/summer/fall, usually doesn't need in Winter  - even during allergy season, taking Claritin, she didn't need to use albuterol more frequently     ACT Total Scores 8/6/2019 10/23/2020 10/25/2021   ACT TOTAL SCORE (Goal Greater than or Equal to 20) 22 21 22   In the past 12 months, how many times did you visit the emergency room for your asthma without being admitted to the hospital? 0 0 0   In the past 12 months, how many times were you hospitalized overnight because of your asthma? 0 0 0     # GERD  - managed with dietary changes   - no reflux but does get \"uneasiness\" in the night, feeling uncomfortable.     - bloating improved a lot with cleansing diet for colonoscopy in [place]  - symptoms have started before  - has noticed chili powder worse than green chilis      - has been taking famotidine 20mg daily at bedtime since our last visit, which helped    # Health Maintenance  - HIV Screening: needs  - Hep C Screening: needs  - BP:   BP Readings from Last 3 Encounters:   10/25/21 (!) 153/88   10/23/20 126/79   08/06/19 (!) 145/82   - Cholesterol:   Recent Labs   Lab Test 10/23/20  0926   CHOL 267.0*   HDL 66.0   .0*   TRIG 185.0*   10/15/21 Chol 260, , , HDL 62    - Diabetes Screening: 10/15/21 A1c 5.8%  - Last Pap: 2018, normal per report. Repeat 10/15/21, results pending  - Colonoscopy: 8/27/21 one polyp - repeat 7 years   - " Mammogram: 11/18/20 BIRADS 1  - Exercise: 7 days a week, [employer] [place] spins/treadmilll on alternate days  - has started tracking calories use [employer] [place]    Wt Readings from Last 6 Encounters:   10/25/21 70.9 kg (156 lb 4 oz)   10/23/20 71.9 kg (158 lb 8 oz)   08/06/19 76.7 kg (169 lb)   10/30/18 75.3 kg (166 lb)   10/22/18 75.3 kg (166 lb)   06/14/18 75.3 kg (166 lb)     - no bleeding since February 2020, no hot flashes  - Mirena placed 2/15/19    Today's PHQ-2 Score:   PHQ-2 ( 1999 Pfizer) 10/25/2021 10/23/2020   Q1: Little interest or pleasure in doing things 0 0   Q2: Feeling down, depressed or hopeless 0 0   PHQ-2 Score 0 0     Review of Systems:   Constitutional - no fevers, chills, night sweats, unintentional weight loss/gain   Eyes - no vision concerns   Ears/Nose/Throat - no hearing concerns, no dysphagia/odynophagia   Cardiovascular - no chest pain, palpitations   Pulmonary - no shortness of breath, wheezing, coughing   GI - as above    - no dysuria, polyuria, hematuria   Musculoskeletal - right lateral hip pain in the morning, 10-15 minutes, resolves with stretching  Integument - no rash   Neuro - no weakness, numbness, paresthesia   Heme - no easy bruising/bleeding   Endocrine - no polyuria, weight loss/gain, dry skin, excessive sweating, hair loss   Psychiatric - no feelings of depressed mood or anhedonia in past 2 weeks   Allergic/Immunologic - no history of anaphylaxis, no history of recurrent infections  153/88  Past Medical History:   Diagnosis Date     Asthma      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     Carcinoid tumor     right lung, upper and middle lobe resected     Elevated hemoglobin A1c     8/6/19 A1c 6.0%     Fibroid (bleeding) (uterine)     chonic ongoing     Infectious viral hepatitis     Food Born Many Years Ago     Uncomplicated asthma      Viral hepatitis A without hepatic coma 1981     Past Surgical History:   Procedure Laterality Date     BIOPSY Bilateral     breast biopsies for nipple  discharge     BIOPSY BREAST Left 10 yrs ago    benign     BREAST CYST ASPIRATION Left     10 years ago, couple times      SECTION       DILATION AND CURETTAGE  10/2018    for menorrhagia related to fibroids. Helped     GYN SURGERY  1995     section     LUNG SURGERY Right     Carcinoid     WI HYSTEROSCOPY,W/ENDO BX N/A 10/30/2018    Procedure: HYSTEROSCOPY, WITH DILATION AND CURETTAGE, MYOMECTOMYINTRAUTERINE DEVICE REMOVAL;  Surgeon: Marine Woods MD;  Location: [facility];  Service: Gynecology     THORACIC SURGERY      Carcinoid tumor removed      WISDOM TOOTH EXTRACTION       Family History   Problem Relation Age of Onset     Parkinsonism Mother      Coronary Artery Disease Mother 53     Cancer Father 75        tongue cancer     Skin Cancer Father      Coronary Artery Disease Maternal Grandfather 55     Coronary Artery Disease Paternal Grandfather      Seasonal/Environmental Allergies Sister      Breast Cancer Paternal Aunt      Breast Cancer Paternal Cousin      Ovarian Cancer No family hx of      Colon Cancer No family hx of      Diabetes No family hx of      Breast Cancer Paternal Aunt         50 something      Breast Cancer Cousin         late 40 s         Social History     Tobacco Use     Smoking status: Never Smoker     Smokeless tobacco: Never Used   Substance Use Topics     Alcohol use: Yes     Alcohol/week: 2.0 standard drinks     Comment: 1 glasses of wine per week     Drug use: No     Social History     Social History Narrative    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     I have reviewed the patient's past medical, surgical, family, and social history.     OBJECTIVE:   BP (!) 153/88 (BP Location: Right arm, Patient Position: Sitting, Cuff Size: Adult Regular)   Pulse 85   Temp 96.9  F (36.1  C) (Skin)   Resp 15   Ht 1.622 m (5' 3.86\")   Wt 70.9 kg (156 lb 4 oz)   SpO2 98%   BMI 26.94 kg/m      Constitutional: well-appearing, appears stated age  Eyes: conjunctivae without erythema, sclera anicteric. Pupils equal, round, and reactive to light.   ENT: oropharynx clear, TM grey bilateral  Cardiac: regular rate and rhythm, 2/6 early systolic murmur at RUSB  Respiratory: lungs clear to auscultation bilaterally, normal work of breathing, no wheezes/crackles  GI: small umbilical hernia- reducible, abdomen soft, non-tender, non-distended  Extremities: warm and well perfused, radial pulses 2+ and equal, cap refill brisk.  Lymph: no cervical or supraclavicular lymphadenopathy  Skin: no rashes, lesions, or wounds  Psych: affect is full and appropriate, speech is fluent and non-pressured  Right hip: no tenderness at right greater trochanter. FADIR negative. SHARLA reproduced lateral hip pain.   "

## 2021-10-25 ENCOUNTER — OFFICE VISIT (OUTPATIENT)
Dept: FAMILY MEDICINE | Facility: CLINIC | Age: 56
End: 2021-10-25
Payer: COMMERCIAL

## 2021-10-25 VITALS
SYSTOLIC BLOOD PRESSURE: 153 MMHG | TEMPERATURE: 96.9 F | WEIGHT: 156.25 LBS | RESPIRATION RATE: 15 BRPM | OXYGEN SATURATION: 98 % | DIASTOLIC BLOOD PRESSURE: 88 MMHG | HEART RATE: 85 BPM | BODY MASS INDEX: 26.67 KG/M2 | HEIGHT: 64 IN

## 2021-10-25 DIAGNOSIS — Z01.419 WELL WOMAN EXAM: Primary | ICD-10-CM

## 2021-10-25 DIAGNOSIS — J45.30 MILD PERSISTENT ASTHMA WITHOUT COMPLICATION: Chronic | ICD-10-CM

## 2021-10-25 DIAGNOSIS — R14.0 ABDOMINAL BLOATING: ICD-10-CM

## 2021-10-25 DIAGNOSIS — K42.9 UMBILICAL HERNIA WITHOUT OBSTRUCTION AND WITHOUT GANGRENE: ICD-10-CM

## 2021-10-25 DIAGNOSIS — R03.0 ELEVATED BP WITHOUT DIAGNOSIS OF HYPERTENSION: ICD-10-CM

## 2021-10-25 DIAGNOSIS — R01.1 SYSTOLIC MURMUR: ICD-10-CM

## 2021-10-25 DIAGNOSIS — M25.551 HIP PAIN, RIGHT: ICD-10-CM

## 2021-10-25 DIAGNOSIS — K21.9 GASTROESOPHAGEAL REFLUX DISEASE WITHOUT ESOPHAGITIS: Chronic | ICD-10-CM

## 2021-10-25 RX ORDER — ALBUTEROL SULFATE 90 UG/1
1-2 AEROSOL, METERED RESPIRATORY (INHALATION) EVERY 6 HOURS PRN
Qty: 8.5 G | Refills: 11 | Status: SHIPPED | OUTPATIENT
Start: 2021-10-25 | End: 2023-01-05

## 2021-10-25 ASSESSMENT — ASTHMA QUESTIONNAIRES
ACT_TOTALSCORE: 22
QUESTION_1 LAST FOUR WEEKS HOW MUCH OF THE TIME DID YOUR ASTHMA KEEP YOU FROM GETTING AS MUCH DONE AT WORK, SCHOOL OR AT HOME: NONE OF THE TIME
QUESTION_3 LAST FOUR WEEKS HOW OFTEN DID YOUR ASTHMA SYMPTOMS (WHEEZING, COUGHING, SHORTNESS OF BREATH, CHEST TIGHTNESS OR PAIN) WAKE YOU UP AT NIGHT OR EARLIER THAN USUAL IN THE MORNING: NOT AT ALL
QUESTION_4 LAST FOUR WEEKS HOW OFTEN HAVE YOU USED YOUR RESCUE INHALER OR NEBULIZER MEDICATION (SUCH AS ALBUTEROL): ONE OR TWO TIMES PER DAY
QUESTION_5 LAST FOUR WEEKS HOW WOULD YOU RATE YOUR ASTHMA CONTROL: COMPLETELY CONTROLLED
QUESTION_2 LAST FOUR WEEKS HOW OFTEN HAVE YOU HAD SHORTNESS OF BREATH: NOT AT ALL

## 2021-10-25 ASSESSMENT — MIFFLIN-ST. JEOR: SCORE: 1286.5

## 2021-10-25 NOTE — NURSING NOTE
"56 year old  Chief Complaint   Patient presents with     Physical     talk about recent lab results and colonoscopy        Blood pressure (!) 157/89, pulse 85, temperature 96.9  F (36.1  C), temperature source Skin, resp. rate 15, height 1.622 m (5' 3.86\"), weight 70.9 kg (156 lb 4 oz), SpO2 98 %, not currently breastfeeding. Body mass index is 26.94 kg/m .  Patient Active Problem List   Diagnosis     Mild persistent asthma without complication     Carcinoid tumor     Gastroesophageal reflux disease, esophagitis presence not specified     Cat allergies     Surveillance of intrauterine contraceptive device     Screening for colon cancer       Wt Readings from Last 2 Encounters:   10/25/21 70.9 kg (156 lb 4 oz)   10/23/20 71.9 kg (158 lb 8 oz)     BP Readings from Last 3 Encounters:   10/25/21 (!) 157/89   10/23/20 126/79   08/06/19 (!) 145/82         Current Outpatient Medications   Medication     albuterol (VENTOLIN HFA) 108 (90 Base) MCG/ACT inhaler     famotidine (PEPCID) 20 MG tablet     fluticasone (FLONASE) 50 MCG/ACT nasal spray     loratadine (CLARITIN) 10 MG tablet     Multiple Vitamins-Minerals (MULTIVITAMIN ADULT PO)     Probiotic Product (PROBIOTIC DAILY PO)     No current facility-administered medications for this visit.       Social History     Tobacco Use     Smoking status: Never Smoker     Smokeless tobacco: Never Used   Substance Use Topics     Alcohol use: Yes     Alcohol/week: 2.0 standard drinks     Comment: 1 glasses of wine per week     Drug use: No       Health Maintenance Due   Topic Date Due     ADVANCE CARE PLANNING  Never done     HEPATITIS C SCREENING  Never done     ZOSTER IMMUNIZATION (1 of 2) Never done     ASTHMA CONTROL TEST  04/23/2021     PREVENTIVE CARE VISIT  10/23/2021     ASTHMA ACTION PLAN  10/23/2021       No results found for: PAP      October 25, 2021 2:24 PM    "

## 2021-10-25 NOTE — LETTER
My Asthma Action Plan    Name: Susie Dupont   YOB: 1965  Date: 10/25/2021   My doctor: Lee Dong MD   My clinic: [facility]        My Rescue Medicine:   Albuterol inhaler (Proair/Ventolin/Proventil HFA)  2-4 puffs EVERY 4 HOURS as needed. Use a spacer if recommended by your provider.   My Asthma Severity:   Intermittent / Exercise Induced  Know your asthma triggers: pollens, animal dander and exercise or sports             GREEN ZONE   Good Control    I feel good    No cough or wheeze    Can work, sleep and play without asthma symptoms         1. If exercise triggers your asthma, take your rescue medication    15 minutes before exercise or sports, and    During exercise if you have asthma symptoms  2. Spacer to use with inhaler: If you have a spacer, make sure to use it with your inhaler             YELLOW ZONE Getting Worse  I have ANY of these:    I do not feel good    Cough or wheeze    Chest feels tight    Wake up at night   1. Keep taking your Green Zone medications  2. Start taking your rescue medicine:    every 20 minutes for up to 1 hour. Then every 4 hours for 24-48 hours.  3. If you stay in the Yellow Zone for more than 12-24 hours, contact your doctor.  4. If you do not return to the Green Zone in 12-24 hours or you get worse, start taking your oral steroid medicine if prescribed by your provider.           RED ZONE Medical Alert - Get Help  I have ANY of these:    I feel awful    Medicine is not helping    Breathing getting harder    Trouble walking or talking    Nose opens wide to breathe       1. Take your rescue medicine NOW  2. If your provider has prescribed an oral steroid medicine, start taking it NOW  3. Call your doctor NOW  4. If you are still in the Red Zone after 20 minutes and you have not reached your doctor:    Take your rescue medicine again and    Call 911 or go to the emergency room right away    See your regular doctor within 2 weeks of an Emergency  Room or Urgent Care visit for follow-up treatment.          Annual Reminders:  Meet with Asthma Educator,  Flu Shot in the Fall, consider Pneumonia Vaccination for patients with asthma (aged 19 and older).    Pharmacy:    [id number] [place], [place] - [address]  [id number] [place], [place] - [address] [facility]  [id number] - [place], [place] - [address]    Electronically signed by Lee Dong MD   Date: 10/25/21                    Asthma Triggers  How To Control Things That Make Your Asthma Worse    Triggers are things that make your asthma worse.  Look at the list below to help you find your triggers and   what you can do about them. You can help prevent asthma flare-ups by staying away from your triggers.      Trigger                                                          What you can do   Cigarette Smoke  Tobacco smoke can make asthma worse. Do not allow smoking in your home, car or around you.  Be sure no one smokes at a child s day care or school.  If you smoke, ask your health care provider for ways to help you quit.  Ask family members to quit too.  Ask your health care provider for a referral to Quit Plan to help you quit smoking, or call 4-747-637-PLAN.     Colds, Flu, Bronchitis  These are common triggers of asthma. Wash your hands often.  Don t touch your eyes, nose or mouth.  Get a flu shot every year.     Dust Mites  These are tiny bugs that live in cloth or carpet. They are too small to see. Wash sheets and blankets in hot water every week.   Encase pillows and mattress in dust mite proof covers.  Avoid having carpet if you can. If you have carpet, vacuum weekly.   Use a dust mask and HEPA vacuum.   Pollen and Outdoor Mold  Some people are allergic to trees, grass, or weed pollen, or molds. Try to keep your windows closed.  Limit time out doors when pollen count is high.   Ask you health care provider about taking medicine during allergy season.     Animal  Dander  Some people are allergic to skin flakes, urine or saliva from pets with fur or feathers. Keep pets with fur or feathers out of your home.    If you can t keep the pet outdoors, then keep the pet out of your bedroom.  Keep the bedroom door closed.  Keep pets off cloth furniture and away from stuffed toys.     Mice, Rats, and Cockroaches  Some people are allergic to the waste from these pests.   Cover food and garbage.  Clean up spills and food crumbs.  Store grease in the refrigerator.   Keep food out of the bedroom.   Indoor Mold  This can be a trigger if your home has high moisture. Fix leaking faucets, pipes, or other sources of water.   Clean moldy surfaces.  Dehumidify basement if it is damp and smelly.   Smoke, Strong Odors, and Sprays  These can reduce air quality. Stay away from strong odors and sprays, such as perfume, powder, hair spray, paints, smoke incense, paint, cleaning products, candles and new carpet.   Exercise or Sports  Some people with asthma have this trigger. Be active!  Ask your doctor about taking medicine before sports or exercise to prevent symptoms.    Warm up for 5-10 minutes before and after sports or exercise.     Other Triggers of Asthma  Cold air:  Cover your nose and mouth with a scarf.  Sometimes laughing or crying can be a trigger.  Some medicines and food can trigger asthma.

## 2021-10-25 NOTE — PATIENT INSTRUCTIONS
"1) Schedule with our dietician Jennie    2) Send me a copy of your colonoscopy report    3) Goal of 1200mg of calcium a day    [email or web address]-[email or web address]-calcium-content-of-common-foods    4) Let me know what your Pap smear results are when you get them    5) Confirm your insurance plan covers Shingrix, the shingles vaccine. If yes, schedule a \"Nurse visit\" to have it done. It's 2 doses, given 2-6 months apart.     6) Call to schedule an echocardiogram (ultrasound of your heart) at [address] [place] [address], [phone]    7) Schedule a mammogram: [phone]    8) I recommend you check your blood pressure once a day, at different times of the day, for 7 days, and then send me those numbers in [email or web address].    How to check your blood pressure at home:  1) Make sure you use the correct size blood pressure cuff. Usually there will be symptoms on the cuff that will show you if it is too big or too small when you put it on your arm  2) Put the cuff on your bare arm. Don't have clothes between the cuff and your arm  3) Do not talk right before or during the check  4) Have you legs uncrossed and feet flat on the floor  5) Rest in the chair you are going to check your pressure in for at least 5 minutes before checking your pressure  6) Have you arm supported while the machine is checking your pressure  7) Make sure your bladder is empty before checking  8) Check your pressure in both arms and see which one has a higher pressure. From then on, continue to check your pressure in the arm you know has the higher pressure.    "

## 2021-10-26 ASSESSMENT — ASTHMA QUESTIONNAIRES: ACT_TOTALSCORE: 22

## 2021-11-11 ENCOUNTER — OFFICE VISIT (OUTPATIENT)
Dept: FAMILY MEDICINE | Facility: CLINIC | Age: 56
End: 2021-11-11
Payer: COMMERCIAL

## 2021-11-11 ENCOUNTER — MYC MEDICAL ADVICE (OUTPATIENT)
Dept: FAMILY MEDICINE | Facility: CLINIC | Age: 56
End: 2021-11-11

## 2021-11-11 ENCOUNTER — ALLIED HEALTH/NURSE VISIT (OUTPATIENT)
Dept: FAMILY MEDICINE | Facility: CLINIC | Age: 56
End: 2021-11-11
Payer: COMMERCIAL

## 2021-11-11 VITALS — BODY MASS INDEX: 26.55 KG/M2 | WEIGHT: 154 LBS

## 2021-11-11 DIAGNOSIS — E78.00 ELEVATED CHOLESTEROL: ICD-10-CM

## 2021-11-11 DIAGNOSIS — Z23 NEED FOR VACCINATION: Primary | ICD-10-CM

## 2021-11-11 DIAGNOSIS — Z71.3 DIETARY COUNSELING AND SURVEILLANCE: Primary | ICD-10-CM

## 2021-11-11 NOTE — NURSING NOTE
Prior to immunization administration, verified patients identity using patient s name and date of birth. Please see Immunization Activity for additional information.     Screening Questionnaire for Adult Immunization    Are you sick today?   No   Do you have allergies to medications, food, a vaccine component or latex?   No   Have you ever had a serious reaction after receiving a vaccination?   No   Do you have a long-term health problem with heart, lung, kidney, or metabolic disease (e.g., diabetes), asthma, a blood disorder, no spleen, complement component deficiency, a cochlear implant, or a spinal fluid leak?  Are you on long-term aspirin therapy?   No   Do you have cancer, leukemia, HIV/AIDS, or any other immune system problem?   No   Do you have a parent, brother, or sister with an immune system problem?   No   In the past 3 months, have you taken medications that affect  your immune system, such as prednisone, other steroids, or anticancer drugs; drugs for the treatment of rheumatoid arthritis, Crohn s disease, or psoriasis; or have you had radiation treatments?   No   Have you had a seizure, or a brain or other nervous system problem?   No   During the past year, have you received a transfusion of blood or blood    products, or been given immune (gamma) globulin or antiviral drug?   No   For women: Are you pregnant or is there a chance you could become       pregnant during the next month?   No   Have you received any vaccinations in the past 4 weeks?   No     Immunization questionnaire answers were all negative.        Per orders of Dr. Dong, injection of Shingrix given by Sharda Rose CMA. Patient instructed to remain in clinic for 15 minutes afterwards, and to report any adverse reaction to me immediately.       Screening performed by Sharda Rose CMA on 11/11/2021 at 4:24 PM.

## 2021-11-11 NOTE — PROGRESS NOTES
"[facility] Nutrition Assessment    Susie Dupont is a 56 year old female who presents for nutrition counseling related to elevated A1c, cholesterol and hx of IBS. Describes having IBS bloating and gas \"for a while\", did have a colonoscopy in August. Recent labs indicates elevated cholesterol and pre-diabetes. These are her main concerns. Finding eating difficult this past year due to the pandemic. Has started making some changes already, but interested in calorie goals, weight loss goals, and macronutrient balance. Using [employer] kyle, interested in maybe some weight loss. Kyle is suggesting 1366 kcal (net calories after exercise 8818-0650 kcal).     IBS has really improved since colonoscopy, much less gas then previously.     Tried intermittent fasting, but stopped one month ago. Was doing black coffee, lunch, snack and dinner (14-16 hour fast). That resulted in weight loss over the past year.     Recent diet recall:  Breakfast: oatmeal with nuts, raisins, and 1 date  Lunch: salad or sandwich (1 slice whole grain bread)  Snack: loves nuts, also hummus and carrots or celery   Dinner: [employer] foods   Desserts: not regularly, but likes sweets     Has identified red wine and red chili pepper as triggers for gas    Social: Works with [employer] at a desk based job, working from home.   Physical activity: Exercise daily, alternate spin bike, treadmill, elliptical; moderate intensity 30 minutes, 10-15 some weights and stretching.   Medications: pepcid  Vitamins/Supplements: multivitamin women's over 50, probiotic, calcium (haven't started yet)    Recent weights:   Wt Readings from Last 6 Encounters:   11/11/21 69.9 kg (154 lb)   10/25/21 70.9 kg (156 lb 4 oz)   10/23/20 71.9 kg (158 lb 8 oz)   08/06/19 76.7 kg (169 lb)   10/30/18 75.3 kg (166 lb)   10/22/18 75.3 kg (166 lb)     Recent A1C values:   Hemoglobin A1C   Date Value Ref Range Status   10/23/2020 5.7 4.1 - 5.7 % Final   08/06/2019 6.0 (H) 4.1 - 5.7 % " Final     Recent lipid values:   Cholesterol   Date Value Ref Range Status   10/23/2020 267.0 (H) 0.0 - 200.0 Final   08/06/2019 228 (H) <200 mg/dL Final     Comment:     Desirable:       <200 mg/dl     HDL Cholesterol   Date Value Ref Range Status   10/23/2020 66.0 >50.0 Final   08/06/2019 53 >49 mg/dL Final     LDL Cholesterol Calculated   Date Value Ref Range Status   08/06/2019 144 (H) <100 mg/dL Final     Comment:     Above desirable:  100-129 mg/dl  Borderline High:  130-159 mg/dL  High:             160-189 mg/dL  Very high:       >189 mg/dl       LDL Cholesterol Direct   Date Value Ref Range Status   10/23/2020 164.0 (H) 0.0 - 129.0 Final     Triglycerides   Date Value Ref Range Status   10/23/2020 185.0 (H) 0.0 - 150.0 Final   08/06/2019 150 (H) <150 mg/dL Final     Comment:     Borderline high:  150-199 mg/dl  High:             200-499 mg/dl  Very high:       >499 mg/dl       BP Readings from Last 3 Encounters:   10/25/21 (!) 153/88   10/23/20 126/79   08/06/19 (!) 145/82         Intervention(s):  Susie Dupont is a 56 year old female who presents for nutrition counseling related to elevated A1c, cholesterol and hx of IBS.  1. Dietary changes to lower cholesterol  Reduce intake of the following sources of cholesterol and saturated fat  - Red meat and fatty meat that isn't trimmed  - Full fat dairy products such as whole milk, cream, ice cream, butter and cheese  - Baked goods made with saturated or trans fats such as donuts, cakes and cookies  - Saturated oils such as coconut, palm oil and palm kernel oil  - Solid fats such as shortening, margarine  Increase intake of whole grains and soluble fiber  - A variety of fruit and vegetables, aim for 5 servings or more per day  - Include a variety of whole grains such as whole grain bread, cereal, oatmeal, barley and brown rice  - Include low fat dairy products  - Consume poultry without skin  - Include fatty fish such as salmon, albacore tuna and sardines  -  Unsalted nuts, seeds (anita seeds, flax seeds) and legumes are also great additions    2. In terms of A1c, continue daily exercise and moderate portions of carbohydrates. Aiming for high fiber carbohydrates as often as possible.   3. For IBS, continue to monitor (written if possible), symptoms and food associations  4. For hypertension, limit sodium to 2300 mg/day. Look at the DASH diet as well.   5. Your current calorie guidelines appear appropriate, but listen to your body and allow more foods if needed.     Monitoring/Evaluation:  Follow up in 3 months - February 10 at 4:40 pm     Patient referred by Lee Dong MD   Total time spent with patient 52 minutes    Jennie Bucio RD

## 2021-11-11 NOTE — PATIENT INSTRUCTIONS
1. Dietary changes to lower cholesterol  Reduce intake of the following sources of cholesterol and saturated fat  - Red meat and fatty meat that isn't trimmed  - Full fat dairy products such as whole milk, cream, ice cream, butter and cheese  - Baked goods made with saturated or trans fats such as donuts, cakes and cookies  - Saturated oils such as coconut, palm oil and palm kernel oil  - Solid fats such as shortening, margarine  Increase intake of whole grains and soluble fiber  - A variety of fruit and vegetables, aim for 5 servings or more per day  - Include a variety of whole grains such as whole grain bread, cereal, oatmeal, barley and brown rice  - Include low fat dairy products  - Consume poultry without skin  - Include fatty fish such as salmon, albacore tuna and sardines  - Unsalted nuts, seeds (anita seeds, flax seeds) and legumes are also great additions    2. In terms of A1c, continue daily exercise and moderate portions of carbohydrates. Aiming for high fiber carbohydrates as often as possible.   3. For IBS, continue to monitor (written if possible), symptoms and food associations  4. For hypertension, limit sodium to 2300 mg/day. Look at the DASH diet as well.   5. Your current calorie guidelines appear appropriate, but listen to your body and allow more foods if needed.     Monitoring/Evaluation:  Follow up in 3 months - February 10 at 4:40 pm

## 2021-11-22 ENCOUNTER — ANCILLARY PROCEDURE (OUTPATIENT)
Dept: CARDIOLOGY | Facility: CLINIC | Age: 56
End: 2021-11-22
Attending: FAMILY MEDICINE
Payer: COMMERCIAL

## 2021-11-22 DIAGNOSIS — R01.1 SYSTOLIC MURMUR: ICD-10-CM

## 2021-11-22 LAB — LVEF ECHO: NORMAL

## 2021-11-22 PROCEDURE — 93306 TTE W/DOPPLER COMPLETE: CPT | Performed by: STUDENT IN AN ORGANIZED HEALTH CARE EDUCATION/TRAINING PROGRAM

## 2021-11-29 ENCOUNTER — ANCILLARY PROCEDURE (OUTPATIENT)
Dept: MAMMOGRAPHY | Facility: CLINIC | Age: 56
End: 2021-11-29
Attending: FAMILY MEDICINE
Payer: COMMERCIAL

## 2021-11-29 DIAGNOSIS — Z01.419 WELL WOMAN EXAM: ICD-10-CM

## 2021-11-29 PROCEDURE — 77067 SCR MAMMO BI INCL CAD: CPT | Mod: GC | Performed by: RADIOLOGY

## 2021-11-29 PROCEDURE — 77063 BREAST TOMOSYNTHESIS BI: CPT | Mod: GC | Performed by: RADIOLOGY

## 2021-12-09 DIAGNOSIS — J45.30 MILD PERSISTENT ASTHMA WITHOUT COMPLICATION: Chronic | ICD-10-CM

## 2021-12-09 DIAGNOSIS — K21.9 GASTROESOPHAGEAL REFLUX DISEASE WITHOUT ESOPHAGITIS: ICD-10-CM

## 2021-12-10 RX ORDER — FAMOTIDINE 20 MG/1
20 TABLET, FILM COATED ORAL 2 TIMES DAILY PRN
Qty: 90 TABLET | Refills: 0 | Status: SHIPPED | OUTPATIENT
Start: 2021-12-10 | End: 2022-05-20

## 2021-12-10 RX ORDER — ALBUTEROL SULFATE 90 UG/1
1-2 AEROSOL, METERED RESPIRATORY (INHALATION) EVERY 6 HOURS PRN
Qty: 18 G | Refills: 11 | OUTPATIENT
Start: 2021-12-10

## 2021-12-10 NOTE — TELEPHONE ENCOUNTER
Medication requested: famotidine (PEPCID) 20 MG tablet  Last office visit: 10/25/21  [facility] appointments: NONE  Medication last refilled: 10/23/20 #90 + 5 refills - takes BID PRN  Last qualifying labs: n/a    Prescription approved per Refill Protocol.  Iqra Queen MS RN-BC  12/10/21  10:47 AM

## 2022-01-17 ENCOUNTER — OFFICE VISIT (OUTPATIENT)
Dept: FAMILY MEDICINE | Facility: CLINIC | Age: 57
End: 2022-01-17
Payer: COMMERCIAL

## 2022-01-17 VITALS
BODY MASS INDEX: 26.08 KG/M2 | HEART RATE: 70 BPM | HEIGHT: 64 IN | WEIGHT: 152.75 LBS | TEMPERATURE: 97.2 F | SYSTOLIC BLOOD PRESSURE: 165 MMHG | OXYGEN SATURATION: 100 % | DIASTOLIC BLOOD PRESSURE: 88 MMHG

## 2022-01-17 DIAGNOSIS — I10 ESSENTIAL HYPERTENSION: Primary | ICD-10-CM

## 2022-01-17 RX ORDER — HYDROCHLOROTHIAZIDE 25 MG/1
25 TABLET ORAL DAILY
Qty: 30 TABLET | Refills: 3 | Status: SHIPPED | OUTPATIENT
Start: 2022-01-17 | End: 2022-02-25

## 2022-01-17 ASSESSMENT — MIFFLIN-ST. JEOR: SCORE: 1269.38

## 2022-01-17 NOTE — PATIENT INSTRUCTIONS
Non-medication ways to improve your blood pressure include:  1) Eating a heart-healthy diet, like the DASH diet or Mediterranean diet, that is high in fruits, vegetables, and lean protein sources (fish, chicken, low-fat dairy products).  2) Reducing sodium (salt) in your diet. For most adults, this means limiting your sodium intake to 2000-2500mg a day or reducing your current intake by about 1000mg a day.  3) Increasing potassium in your diet unless you have kidney disease or take medications (like Lisinopril or Spironolactone) that increase your blood potassium level. For most adults, this means trying to get at least 3500mg of potassium in your diet a day. Common sources of potassium include vegetables and fruits, as well as potassium-based table salts.  4) Increasing your exercise. Ideally you would get  minutes of aerobic exercise a week, shooting to get your heart rate to 65-75% of your maximal predicted rate (220 minus your age). Resistance exercise (both dynamic and isometric) also help lower your pressure.    6) If you are overweight or obese, losing weight can help lower your blood pressure  7) Avoid regular use of medications like ibuprofen, naproxen, Aleve, Motrin, Excedrin, Advil. Tylenol (acetaminophen) is okay.    9) Getting good-quality, regular, adequate-duration sleep is associated with lower blood pressures

## 2022-01-17 NOTE — PROGRESS NOTES
ASSESSMENT AND PLAN:     (I10) Essential hypertension  (primary encounter diagnosis)  Comment: Stage 2 hypertension based on office and home recordings.   Discussed extensively lifestyle changes that can help lower BP.  Recommended starting on pharmacotherapy as well. Discussed primary options for hypertension managements. Susie opted for thiazide therapy to start with. We reviewed the potential side effects of hydrochlorothiazide. I suspect she will need combination therapy for optimal control (goal <130/80), which I informed her.    Started on hydrochlorothiazide 25mg daily. Follow up via [facility] with ~7 days of home recordings in a month's time. Will also plan for a lab visit then for BMP.    Plan: hydrochlorothiazide (HYDRODIURIL) 25 MG tablet          I spent a total of 31 minutes on the day of the visit.   Time spent doing chart review, history and exam, documentation and further activities per the note    MD AIMEE [facility]  01/17/2022, 5:20 PM      SUBJECTIVE:   Susie is a 56 year old female who presents to clinic today for a return visit.    # Hypertension   BP Readings from Last 5 Encounters:   01/17/22 (!) 165/88   10/25/21 (!) 153/88   10/23/20 126/79   08/06/19 (!) 145/82   10/22/18 132/84   - has been checking BP at home and seeing similar numbers to office readings (see MyChart encounter)  - Current Regimen: not on medication    - mother had hypertension, father never did  - exercising 30 minutes aerobic activity, average , every day  - 1-2 alcoholic drinks a week  - doesn't smoke    STOP BANG Questionnaire   Body mass index is 26.4 kg/m .  1. Snoring   Do you snore loudly (louder than talking or loud enough to be heard   through closed doors)? yes  2. Tired   Do you often feel tired, fatigued, or sleepy during daytime? no  3. Observed   Has anyone observed you stop breathing during your sleep? no  4. Blood pressure   Do you have or are you being treated for  Subjective:       Patient ID: Florentino Gr high blood pressure? yes  5. BMI   BMI more than 35 kg/m2? no  6. Age   Age over 50 yr old? yes  7. Neck circumference   Neck circumference greater than 40 cm? no  8. Gender   Gender male? no    High risk of ALIA: answering yes to three or more items   Low risk of ALIA: answering yes to less than three items     Adapted from STOP Questionnaire: A Tool to Screen Patients for Obstructive Sleep Apnea. Robert Gonzales, Brian Trujillo, Laurie Franco, Anna Marie Burgess, Rachel Durand, Destiny Berrios, and Hiren Hill. Anesthesiology 2008; [id number]:812-21      Patient Active Problem List   Diagnosis     Mild persistent asthma without complication     Carcinoid tumor     Gastroesophageal reflux disease, esophagitis presence not specified     Cat allergies     Surveillance of intrauterine contraceptive device     Screening for colon cancer     Umbilical hernia without obstruction and without gangrene     Current Outpatient Medications   Medication     albuterol (VENTOLIN HFA) 108 (90 Base) MCG/ACT inhaler     fluticasone (FLONASE) 50 MCG/ACT nasal spray     loratadine (CLARITIN) 10 MG tablet     Multiple Vitamins-Minerals (MULTIVITAMIN ADULT PO)     Probiotic Product (PROBIOTIC DAILY PO)     famotidine (PEPCID) 20 MG tablet     No current facility-administered medications for this visit.       I have reviewed the patient's relevant past medical history.     OBJECTIVE:   BP (!) 165/88 (BP Location: Left arm, Patient Position: Sitting, Cuff Size: Adult Regular)   Pulse 70   Temp 97.2  F (36.2  C) (Temporal)   Ht 1.62 m (5' 3.78\")   Wt 69.3 kg (152 lb 12 oz)   SpO2 100%   BMI 26.40 kg/m      Constitutional: well-appearing, appears stated age  Eyes: conjunctivae without erythema, sclera anicteric.   Skin: no rashes, lesions, or wounds  Psych: affect is full and appropriate, speech is fluent and non-pressured  " Carmina Carmona  10/23/2017

## 2022-01-17 NOTE — NURSING NOTE
"56 year old  Chief Complaint   Patient presents with     Hypertension     f/u BP        Blood pressure (!) 156/85, pulse 70, temperature 97.2  F (36.2  C), temperature source Temporal, height 1.62 m (5' 3.78\"), weight 69.3 kg (152 lb 12 oz), SpO2 100 %, not currently breastfeeding. Body mass index is 26.4 kg/m .  Patient Active Problem List   Diagnosis     Mild persistent asthma without complication     Carcinoid tumor     Gastroesophageal reflux disease, esophagitis presence not specified     Cat allergies     Surveillance of intrauterine contraceptive device     Screening for colon cancer     Umbilical hernia without obstruction and without gangrene       Wt Readings from Last 2 Encounters:   01/17/22 69.3 kg (152 lb 12 oz)   11/11/21 69.9 kg (154 lb)     BP Readings from Last 3 Encounters:   01/17/22 (!) 156/85   10/25/21 (!) 153/88   10/23/20 126/79         Current Outpatient Medications   Medication     albuterol (VENTOLIN HFA) 108 (90 Base) MCG/ACT inhaler     fluticasone (FLONASE) 50 MCG/ACT nasal spray     loratadine (CLARITIN) 10 MG tablet     Multiple Vitamins-Minerals (MULTIVITAMIN ADULT PO)     Probiotic Product (PROBIOTIC DAILY PO)     famotidine (PEPCID) 20 MG tablet     No current facility-administered medications for this visit.       Social History     Tobacco Use     Smoking status: Never Smoker     Smokeless tobacco: Never Used   Substance Use Topics     Alcohol use: Yes     Alcohol/week: 2.0 standard drinks     Comment: 1 glasses of wine per week     Drug use: No       Health Maintenance Due   Topic Date Due     ADVANCE CARE PLANNING  Never done     HEPATITIS C SCREENING  Never done     PHQ-2  01/01/2022     ZOSTER IMMUNIZATION (2 of 2) 01/06/2022       No results found for: PAP      January 17, 2022 4:39 PM  "

## 2022-02-10 ENCOUNTER — OFFICE VISIT (OUTPATIENT)
Dept: FAMILY MEDICINE | Facility: CLINIC | Age: 57
End: 2022-02-10
Payer: COMMERCIAL

## 2022-02-10 DIAGNOSIS — I10 ESSENTIAL HYPERTENSION: ICD-10-CM

## 2022-02-10 DIAGNOSIS — Z23 NEED FOR VACCINATION: Primary | ICD-10-CM

## 2022-02-10 DIAGNOSIS — E78.00 ELEVATED CHOLESTEROL: ICD-10-CM

## 2022-02-10 DIAGNOSIS — Z71.3 DIETARY COUNSELING AND SURVEILLANCE: ICD-10-CM

## 2022-02-10 NOTE — NURSING NOTE
Prior to immunization administration, verified patients identity using patient s name and date of birth. Please see Immunization Activity for additional information.     Screening Questionnaire for Adult Immunization    Are you sick today?   No   Do you have allergies to medications, food, a vaccine component or latex?   No   Have you ever had a serious reaction after receiving a vaccination?   No   Do you have a long-term health problem with heart, lung, kidney, or metabolic disease (e.g., diabetes), asthma, a blood disorder, no spleen, complement component deficiency, a cochlear implant, or a spinal fluid leak?  Are you on long-term aspirin therapy?   No   Do you have cancer, leukemia, HIV/AIDS, or any other immune system problem?   No   Do you have a parent, brother, or sister with an immune system problem?   No   In the past 3 months, have you taken medications that affect  your immune system, such as prednisone, other steroids, or anticancer drugs; drugs for the treatment of rheumatoid arthritis, Crohn s disease, or psoriasis; or have you had radiation treatments?   No   Have you had a seizure, or a brain or other nervous system problem?   No   During the past year, have you received a transfusion of blood or blood    products, or been given immune (gamma) globulin or antiviral drug?   No   For women: Are you pregnant or is there a chance you could become       pregnant during the next month?   No   Have you received any vaccinations in the past 4 weeks?   No     Immunization questionnaire answers were all negative.        Per orders of Dr. Dong , injection of shingrix  given by Jazmine Mccann. Patient instructed to remain in clinic for 15 minutes afterwards, and to report any adverse reaction to me immediately.       Screening performed by Jazmine Mccann on 2/10/2022 at 4:50 PM.

## 2022-02-10 NOTE — PATIENT INSTRUCTIONS
Intervention(s):  1. Agree with goal of 8760-7699 mg/day of sodium  2. Start with one tab of calcium with lunchtime meal    Monitoring/Evaluation:  Follow up:  Not planned today, will plan for 3-4 months pending follow up labs    Jennie Bucio RD

## 2022-02-10 NOTE — PROGRESS NOTES
Nutrition Reassessment    Susie Dupont is a 56 year old female who presents for follow up nutrition counseling related to high cholesterol, IBS and now high blood pressure. She adopted nutrition changes in November 2021 and maintained except for a 3 week hiatus from eating plan while in [place]. She is counting calories as often as possible - goal is usually 1350 kcal up to 200 kcal more based on exercise.   Has been monitoring BP at home and it has been trending high. Has started a diuretic. Will start checking BP again in the next few weeks and follow up with Dr. Dong.  Has cut back on salt slightly; limits to 1/4 tsp of added salt over the course of several days.  GI struggles remain reduced and stable.    Reduced famotidine use from daily to less than once a month.    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   Physical activity: Exercise daily, alternate spin bike, treadmill, elliptical; moderate intensity 30 minutes, 10-15 some weights and stretching.   Medications: pepcid  Vitamins/Supplements: multivitamin women's over 50, probiotic, calcium (haven't started yet)    Recent weights:   Wt Readings from Last 6 Encounters:   01/17/22 69.3 kg (152 lb 12 oz)   11/11/21 69.9 kg (154 lb)   10/25/21 70.9 kg (156 lb 4 oz)   10/23/20 71.9 kg (158 lb 8 oz)   08/06/19 76.7 kg (169 lb)   10/30/18 75.3 kg (166 lb)       Intervention(s):  Susie Dupont is a 56 year old female who presents for follow up nutrition counseling related to high cholesterol, IBS and now high blood pressure. She has adhered to previous recommendations. Today we reinforced sodium recommendations and briefly discussed the concepts of the DASH diet (very close to her current food plan).   1. Agree with goal of 3205-1636 mg/day of sodium  2. Start with one tab of calcium with lunchtime  meal    Monitoring/Evaluation:  Follow up:  Not planned today, will plan for 3-4 months pending follow up labs    Patient referred by Lee Dong MD   Total time spent with patient 20 minutes    Jennie Bucio RD

## 2022-02-17 PROBLEM — K21.9 GASTROESOPHAGEAL REFLUX DISEASE: Chronic | Status: ACTIVE | Noted: 2017-06-23

## 2022-02-24 ENCOUNTER — LAB (OUTPATIENT)
Dept: LAB | Facility: CLINIC | Age: 57
End: 2022-02-24
Payer: COMMERCIAL

## 2022-02-24 DIAGNOSIS — I10 ESSENTIAL HYPERTENSION: ICD-10-CM

## 2022-02-24 DIAGNOSIS — Z01.419 WELL WOMAN EXAM: ICD-10-CM

## 2022-02-24 LAB
ANION GAP SERPL CALCULATED.3IONS-SCNC: 5 MMOL/L (ref 3–14)
BUN SERPL-MCNC: 17 MG/DL (ref 7–30)
CALCIUM SERPL-MCNC: 9.2 MG/DL (ref 8.5–10.1)
CHLORIDE BLD-SCNC: 105 MMOL/L (ref 94–109)
CHOLEST SERPL-MCNC: 239 MG/DL
CO2 SERPL-SCNC: 30 MMOL/L (ref 20–32)
CREAT SERPL-MCNC: 0.79 MG/DL (ref 0.52–1.04)
FASTING STATUS PATIENT QL REPORTED: NO
GFR SERPL CREATININE-BSD FRML MDRD: 88 ML/MIN/1.73M2
GLUCOSE BLD-MCNC: 98 MG/DL (ref 70–99)
HDLC SERPL-MCNC: 49 MG/DL
LDLC SERPL CALC-MCNC: 128 MG/DL
LDLC SERPL CALC-MCNC: ABNORMAL MG/DL
NONHDLC SERPL-MCNC: 190 MG/DL
POTASSIUM BLD-SCNC: 4.6 MMOL/L (ref 3.4–5.3)
SODIUM SERPL-SCNC: 140 MMOL/L (ref 133–144)
TRIGL SERPL-MCNC: 526 MG/DL

## 2022-02-24 PROCEDURE — 83721 ASSAY OF BLOOD LIPOPROTEIN: CPT | Performed by: FAMILY MEDICINE

## 2022-02-24 PROCEDURE — 80048 BASIC METABOLIC PNL TOTAL CA: CPT | Performed by: FAMILY MEDICINE

## 2022-02-24 PROCEDURE — 80061 LIPID PANEL: CPT | Performed by: FAMILY MEDICINE

## 2022-04-26 ENCOUNTER — LAB (OUTPATIENT)
Dept: LAB | Facility: CLINIC | Age: 57
End: 2022-04-26
Payer: COMMERCIAL

## 2022-04-26 DIAGNOSIS — E78.1 HIGH TRIGLYCERIDES: ICD-10-CM

## 2022-04-26 DIAGNOSIS — I10 ESSENTIAL HYPERTENSION: ICD-10-CM

## 2022-04-26 LAB
ANION GAP SERPL CALCULATED.3IONS-SCNC: 3 MMOL/L (ref 3–14)
BUN SERPL-MCNC: 11 MG/DL (ref 7–30)
CALCIUM SERPL-MCNC: 9.5 MG/DL (ref 8.5–10.1)
CHLORIDE BLD-SCNC: 108 MMOL/L (ref 94–109)
CHOLEST SERPL-MCNC: 232 MG/DL
CO2 SERPL-SCNC: 31 MMOL/L (ref 20–32)
CREAT SERPL-MCNC: 0.83 MG/DL (ref 0.52–1.04)
FASTING STATUS PATIENT QL REPORTED: YES
GFR SERPL CREATININE-BSD FRML MDRD: 83 ML/MIN/1.73M2
GLUCOSE BLD-MCNC: 110 MG/DL (ref 70–99)
HDLC SERPL-MCNC: 58 MG/DL
LDLC SERPL CALC-MCNC: 140 MG/DL
NONHDLC SERPL-MCNC: 174 MG/DL
POTASSIUM BLD-SCNC: 4.6 MMOL/L (ref 3.4–5.3)
SODIUM SERPL-SCNC: 142 MMOL/L (ref 133–144)
TRIGL SERPL-MCNC: 169 MG/DL

## 2022-04-26 PROCEDURE — 80061 LIPID PANEL: CPT | Performed by: FAMILY MEDICINE

## 2022-04-26 PROCEDURE — 80048 BASIC METABOLIC PNL TOTAL CA: CPT | Performed by: FAMILY MEDICINE

## 2022-05-02 DIAGNOSIS — I10 ESSENTIAL HYPERTENSION: ICD-10-CM

## 2022-05-02 RX ORDER — LISINOPRIL AND HYDROCHLOROTHIAZIDE 20; 25 MG/1; MG/1
1 TABLET ORAL DAILY
Qty: 90 TABLET | Refills: 1 | Status: SHIPPED | OUTPATIENT
Start: 2022-05-02 | End: 2022-11-01

## 2022-05-02 NOTE — TELEPHONE ENCOUNTER
Medication requested: lisinopril-hydrochlorothiazide (ZESTORETIC) 20-25 MG tablet  Last office visit: 2/10/22  [facility] appointments: none  Medication last refilled: 2/25/22; 30 + 3 refills  Last qualifying labs:   BP Readings from Last 3 Encounters:   04/27/22 129/72   02/25/22 (!) 145/82   01/17/22 (!) 165/88     Component      Latest Ref Rng & Units 4/26/2022   Sodium      133 - 144 mmol/L 142   Potassium      3.4 - 5.3 mmol/L 4.6   Chloride      94 - 109 mmol/L 108   Carbon Dioxide      20 - 32 mmol/L 31   Anion Gap      3 - 14 mmol/L 3   Urea Nitrogen      7 - 30 mg/dL 11   Creatinine      0.52 - 1.04 mg/dL 0.83   Calcium      8.5 - 10.1 mg/dL 9.5   Glucose      70 - 99 mg/dL 110 (H)   GFR Estimate      >60 mL/min/1.73m2 83     Prescription approved per [facility] Refill Protocol.    Jg CORTES, RN  05/02/22 4:28 PM

## 2022-05-18 ENCOUNTER — VIRTUAL VISIT (OUTPATIENT)
Dept: FAMILY MEDICINE | Facility: CLINIC | Age: 57
End: 2022-05-18
Payer: COMMERCIAL

## 2022-05-18 DIAGNOSIS — E78.00 ELEVATED CHOLESTEROL: ICD-10-CM

## 2022-05-18 DIAGNOSIS — I10 ESSENTIAL HYPERTENSION: ICD-10-CM

## 2022-05-18 DIAGNOSIS — Z71.3 DIETARY COUNSELING AND SURVEILLANCE: Primary | ICD-10-CM

## 2022-05-18 NOTE — PROGRESS NOTES
Nutrition Reassessment    Previous goal(s):  1. Agree with goal of 9138-2729 mg/day of sodium  2. Start with one tab of calcium with lunchtime meal    Susie is a 56 year old female who presents for nutrition counseling related to high cholesterol and blood pressure. Blood pressure now improved, she is still working on lifestyle changes to reduce weight and decrease cholesterol if possible. We reviewed her most recent lab results as they compare to her previous results.     For the most part, Susie had maintained the previous changes to diet and exercise. Going back into the office which impacts commute time and is not using the [place] as often as possible.    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   Physical activity: Exercise daily, alternate spin bike, treadmill, elliptical; moderate intensity 30 minutes, 10-15 some weights and stretching.   Medications: pepcid  Vitamins/Supplements: multivitamin women's over 50, probiotic, calcium (haven't started yet)    Recent weights:   Wt Readings from Last 6 Encounters:   01/17/22 69.3 kg (152 lb 12 oz)   11/11/21 69.9 kg (154 lb)   10/25/21 70.9 kg (156 lb 4 oz)   10/23/20 71.9 kg (158 lb 8 oz)   08/06/19 76.7 kg (169 lb)   10/30/18 75.3 kg (166 lb)     Home 150 lbs    Recent Labs   Lab Test 04/26/22  0833 02/24/22  1506 10/23/20  0926   CHOL 232* 239* 267.0*   HDL 58 49* 66.0   * 128* 164.0*   TRIG 169* 526* 185.0*   CHOLHDLRATIO  --   --  4.0     10/16/21 - Chol 260, , , A1c 5.8%,       Lab Results   Component Value Date    A1C 5.7 10/23/2020    A1C 6.0 08/06/2019     Lab Results   Component Value Date      "04/26/2022    GLC 98 02/24/2022    GLC 13.6 10/30/2018         Intervention(s):  Susie is a 56 year old female who presents for nutrition counseling related to high cholesterol and blood pressure. We reviewed her most recent labs compared to previous, overall much improved.   1. Reviewed cholesterol sources in diet, no obvious areas to change as Susie has maintained a low saturated fat eating plan. Feel it is okay to continue using coconut milk in cooking as unsure that this change alone would result in meaningful decrease in cholesterol values.   2. Carbohydrate sources in diet reviewed along with general serving sizes. If blood glucose were to trend up in the future, we would focus on moderate intake of total carbohydrate at meal/snack times rather then restricting added sugar only.   3. Current sodium intake appears appropriate    Monitoring/Evaluation:  Follow up:  As needed and/or questions via My Chart    Patient referred by Lee Dong MD   Total time spent with patient 27 minutes    Jennie Bucio RD     Family Medicine Video Visit Note  Susie is being evaluated via a billable video visit.             Video Visit Consent     The patient has been notified of following:     \"This video visit will be conducted via a call between you and your physician/provider. We have found that certain health care needs can be provided without the need for an in-person physical exam.  This service lets us provide the care you need with a video conversation.  If a prescription is necessary we can send it directly to your pharmacy.  If lab work is needed we can place an order for that and you can then stop by our lab to have the test done at a later time.    Video visits are billed at different rates depending on your insurance coverage.  Please reach out to your insurance provider with any questions.    If during the course of the call the physician/provider feels a video visit is not appropriate, you will not " "be charged for this service.\"    Patient has given verbal consent for Video visit? Yes    How would you like to obtain your AVS? Malikharmiguel angel    Patient would like the video invitation sent by: Other e-mail: My Chart    Will anyone else be joining your video visit? No       Video-Visit Details    Type of service:  Video Visit    Video Start Time: 3:40 PM  THIS is the time provider and patient connect.    Video End Time (time video stopped): 4:07 PM    Originating Location (pt. Location): Home    Distant Location (provider location):  [facility]     Mode of Communication:  Video Conference via Kwan Bucio RD                    "

## 2022-05-20 ENCOUNTER — MYC REFILL (OUTPATIENT)
Dept: FAMILY MEDICINE | Facility: CLINIC | Age: 57
End: 2022-05-20
Payer: COMMERCIAL

## 2022-05-20 DIAGNOSIS — K21.9 GASTROESOPHAGEAL REFLUX DISEASE WITHOUT ESOPHAGITIS: ICD-10-CM

## 2022-05-20 RX ORDER — FAMOTIDINE 20 MG/1
20 TABLET, FILM COATED ORAL 2 TIMES DAILY PRN
Qty: 90 TABLET | Refills: 0 | Status: SHIPPED | OUTPATIENT
Start: 2022-05-20 | End: 2022-06-16

## 2022-05-20 NOTE — TELEPHONE ENCOUNTER
Famotidine (Pepcid) 20 mg    Last Office Visit: 5/18/22  [facility] Appointments: None  Medication last refilled: 12/10/21 #90 with 0 refill(s)    Prescription approved per [facility] Refill Protocol.    MITESH LeijaN, RN, CCM

## 2022-06-16 DIAGNOSIS — K21.9 GASTROESOPHAGEAL REFLUX DISEASE WITHOUT ESOPHAGITIS: ICD-10-CM

## 2022-06-16 RX ORDER — FAMOTIDINE 20 MG/1
20 TABLET, FILM COATED ORAL 2 TIMES DAILY PRN
Qty: 180 TABLET | Refills: 0 | Status: SHIPPED | OUTPATIENT
Start: 2022-06-16 | End: 2022-10-12

## 2022-06-16 NOTE — TELEPHONE ENCOUNTER
Famotidine (Pepcid) 20 mg    Last Office Visit: 1/17/22  [facility] Appointments: None  Medication last refilled: 5/20/22 #90 with 0 refill(s)    Prescription approved per [facility] Refill Protocol.      *Patient taking BID PRN.  Increased quantity to #180 (enough for BID for 3 months)    MITESH LeijaN, RN, CCM

## 2022-07-12 ENCOUNTER — NURSE TRIAGE (OUTPATIENT)
Dept: NURSING | Facility: CLINIC | Age: 57
End: 2022-07-12

## 2022-07-13 NOTE — TELEPHONE ENCOUNTER
Nurse Triage SBAR    Situation: Triage call for drug overdose.     Background: Patient, Susie, calling.     Assessment: Pt takes lisinopril-hydrochlorothiazide (ZESTORETIC) 20-25 MG tablet (one tab daily).  She took her usual dose today at 2PM.  She mistakenly took another dose tonight at 8PM.  She is asymptomatic at this time.      Protocol Recommended Disposition: Referred to other Agency  - call transferred to poison control.  Patient verbalized understanding and had no further questions.  Call transferred at 8:07PM.     Christina Braswell RN  [facility] Nurse Advisor  _____________________________________________      COVID 19 Nurse Triage Plan/Patient Instructions    Please be aware that novel coronavirus (COVID-19) may be circulating in the community. If you develop symptoms such as fever, cough, or SOB or if you have concerns about the presence of another infection including coronavirus (COVID-19), please contact your health care provider or visit [email or web address].     Thank you for taking steps to prevent the spread of this virus.  o Limit your contact with others.  o Wear a simple mask to cover your cough.  o Wash your hands well and often.    Resources    [facility]: About COVID-19: [email or web address]    CDC: What to Do If You're Sick: www.cdc.gov/coronavirus/2019-ncov/about/steps-when-sick.html    CDC: Ending Home Isolation: www.cdc.gov/coronavirus/2019-ncov/hcp/disposition-in-home-patients.html     CDC: Caring for Someone: www.cdc.gov/coronavirus/2019-ncov/if-you-are-sick/care-for-someone.html     [facility]: Interim Guidance for Hospital Discharge to Home: www.health.[facility].mn.us/diseases/coronavirus/hcp/hospdischarge.pdf    [facility] clinical trials (COVID-19 research studies): [email or web address]/umn-clinical-trials     Below are the COVID-19 hotlines at the [place] Department of Health ([facility]). Interpreters are available.   o For health  questions: Call 509-812-7889 or 1-366.845.2150 (7 a.m. to 7 p.m.)  o For questions about schools and childcare: Call 909-325-6170 or 1-231.256.7169 (7 a.m. to 7 p.m.)         Reason for Disposition    [1] DOUBLE DOSE (an extra dose or lesser amount) of prescription drug AND [2] NO symptoms (Exception: a double dose of antibiotics)    Additional Information    Negative: MORE THAN A DOUBLE DOSE of a prescription or over-the-counter (OTC) drug    Negative: [1] DOUBLE DOSE (an extra dose or lesser amount) of over-the-counter (OTC) drug AND [2] any symptoms (e.g., dizziness, nausea, pain, sleepiness)    Negative: [1] DOUBLE DOSE (an extra dose or lesser amount) of prescription drug AND [2] any symptoms (e.g., dizziness, nausea, pain, sleepiness)    Negative: Took another person's prescription drug    Protocols used: MEDICATION QUESTION CALL-A-

## 2022-08-08 NOTE — PROGRESS NOTES
ASSESSMENT AND PLAN:     1. Common warts  Increasing in number since last visit, now present on the right shin, right ankle, left ankle, and right hand. We discussed methods to prevent spread, including avoiding using lotions on the area or scratching. Given the numerous warts present and possibility of skin discoloration with removal, recommend a referral to dermatology to discuss options.   - Adult Dermatology Referral; Future    2. Essential hypertension  Unstable on Lisinopril-hydrochlorothiazide 20-25 mg tablet. Blood pressure today was 106/68 and she has noted some lightheadedness/dizziness when going from squatting to standing. We discussed options to reduce medication including lowering the dose of Lisinopril-hydrochlorothiazide or discontinuing either Lisinopril or hydrochlorothiazide. She will check blood pressure readings at home and send a Para[employer] message with BP readings. If they continue to be low, we will plan to decrease medication appropriately depending on the blood pressure numbers.    3. Stress  Stable with yoga and meditation. We discussed medication options including propranolol. Given her borderline low blood pressure and feeling that symptoms are well-controlled with non-medication techniques, she is not interested in starting medication at this time. If stress or anxiety symptoms worsen, she can schedule a follow up or message in [employer] to discuss options.               Felipa Paris, MS3    I was present with the medical student who participated in the service and in the documentation of the note. I have verified the history and personally performed the physical exam and medical decision making. I agree with the assessment and plan of care as documented in the note.  Lee Dong MD   [facility]  08/11/2022, 1:17 PM      SUBJECTIVE:   Susie is a 57 year old female who presents to clinic today for a return visit.    # Skin Issue  - present for about a year and a  half when we first discussed it in 10/2020  - right shin growth  - not painful or itchy  - Occurred abruptly   - Spread over the past 3-4 months, first noticed a few additional and now there are 10+  - She thinks it might be warts  - Has not tried anything for it at home or over the counter. Would be interested in having these removed today.   - Doesn't like the cosmetic appearance and that it's spreading  - A few months ago notiecd a spot on her right hand that may be wart  - She does have a history of seasonal allergies with itching around her ankles, has not been scratching the area   - Applying topical anti-itch cream   - Also has dry skin on the palm of the her hand for many years, applies aquaphor    # Blood Pressure  - Some lightheadedness when going from squatting to standing   - Not taking blood pressure at home  - On Lisinopril-hydrochlorothiazide 20-25  - Exercises every day - jogging, spin biking, 30-45 minutes  - Thinks it spikes from 30-60 minutes with anxiety or stress    # Anxiety/Stress  - Stress and anxiety with big events  - Feels blood pressure increasing   - Triggered by certain things and would like to set boundaries and avoid these triggers   - Is doing yoga and meditation    # COVID  - Recently contracted Covid, not symptomatic today  - Vaccinated and boosted    # Hypertension   BP Readings from Last 5 Encounters:   08/11/22 106/68   04/27/22 129/72   02/25/22 (!) 145/82   01/17/22 (!) 165/88   10/25/21 (!) 153/88     Creatinine   Date Value Ref Range Status   04/26/2022 0.83 0.52 - 1.04 mg/dL Final   02/24/2022 0.79 0.52 - 1.04 mg/dL Final     Sodium   Date Value Ref Range Status   04/26/2022 142 133 - 144 mmol/L Final     Potassium   Date Value Ref Range Status   04/26/2022 4.6 3.4 - 5.3 mmol/L Final     - Current Regimen: Lisinopril-hydrochlorothiazide 20-25mg daily  - Missed doses?: No  - Headache, dizziness, blurry vision, chest pain, dyspnea, orthopnea, PND, lower extremity edema?:  "Dizziness upon standing        Patient Active Problem List   Diagnosis     Mild persistent asthma without complication     Carcinoid tumor     Gastroesophageal reflux disease, esophagitis presence not specified     Cat allergies     Surveillance of intrauterine contraceptive device     Screening for colon cancer     Umbilical hernia without obstruction and without gangrene     Essential hypertension     Current Outpatient Medications   Medication     albuterol (VENTOLIN HFA) 108 (90 Base) MCG/ACT inhaler     famotidine (PEPCID) 20 MG tablet     fluticasone (FLONASE) 50 MCG/ACT nasal spray     lisinopril-hydrochlorothiazide (ZESTORETIC) 20-25 MG tablet     loratadine (CLARITIN) 10 MG tablet     Multiple Vitamins-Minerals (MULTIVITAMIN ADULT PO)     Probiotic Product (PROBIOTIC DAILY PO)     No current facility-administered medications for this visit.       I have reviewed the patient's relevant past medical history.     OBJECTIVE:   /70   Pulse 90   Temp 97.6  F (36.4  C)   Ht 1.62 m (5' 3.78\")   Wt 69 kg (152 lb 1.3 oz)   SpO2 98%   BMI 26.28 kg/m      Constitutional: well-appearing, appears stated age  Eyes: conjunctivae without erythema, sclera anicteric.   Cardiac: regular rate and rhythm, 2/6 systolic murmur only audible at RUSB.     Skin: 2 mm papule on left anterior ankle. 7 mm round papule on right anterior shin stable from 2020, firm, rough and with sharp demarcations. Multiple (10+) smaller papules with similar appearance on the right shin and ankle. There is a 3 mm round, firm papule on the right palm between the fourth and fifth digits with rough texture.     Left wrist: 1cm firm nodule right anterior wrist, transilluminates  "

## 2022-08-11 ENCOUNTER — OFFICE VISIT (OUTPATIENT)
Dept: FAMILY MEDICINE | Facility: CLINIC | Age: 57
End: 2022-08-11
Payer: COMMERCIAL

## 2022-08-11 VITALS
HEIGHT: 64 IN | SYSTOLIC BLOOD PRESSURE: 106 MMHG | HEART RATE: 90 BPM | WEIGHT: 152.08 LBS | OXYGEN SATURATION: 98 % | BODY MASS INDEX: 25.96 KG/M2 | TEMPERATURE: 97.6 F | DIASTOLIC BLOOD PRESSURE: 68 MMHG

## 2022-08-11 DIAGNOSIS — F43.9 STRESS: ICD-10-CM

## 2022-08-11 DIAGNOSIS — B07.8 COMMON WART: Primary | ICD-10-CM

## 2022-08-11 DIAGNOSIS — I10 ESSENTIAL HYPERTENSION: ICD-10-CM

## 2022-08-11 ASSESSMENT — ASTHMA QUESTIONNAIRES
QUESTION_3 LAST FOUR WEEKS HOW OFTEN DID YOUR ASTHMA SYMPTOMS (WHEEZING, COUGHING, SHORTNESS OF BREATH, CHEST TIGHTNESS OR PAIN) WAKE YOU UP AT NIGHT OR EARLIER THAN USUAL IN THE MORNING: NOT AT ALL
ACT_TOTALSCORE: 21
QUESTION_2 LAST FOUR WEEKS HOW OFTEN HAVE YOU HAD SHORTNESS OF BREATH: NOT AT ALL
QUESTION_5 LAST FOUR WEEKS HOW WOULD YOU RATE YOUR ASTHMA CONTROL: WELL CONTROLLED
QUESTION_1 LAST FOUR WEEKS HOW MUCH OF THE TIME DID YOUR ASTHMA KEEP YOU FROM GETTING AS MUCH DONE AT WORK, SCHOOL OR AT HOME: NONE OF THE TIME
QUESTION_4 LAST FOUR WEEKS HOW OFTEN HAVE YOU USED YOUR RESCUE INHALER OR NEBULIZER MEDICATION (SUCH AS ALBUTEROL): ONE OR TWO TIMES PER DAY
ACT_TOTALSCORE: 21

## 2022-08-18 ENCOUNTER — TRANSFERRED RECORDS (OUTPATIENT)
Dept: HEALTH INFORMATION MANAGEMENT | Facility: CLINIC | Age: 57
End: 2022-08-18

## 2022-09-11 ENCOUNTER — HEALTH MAINTENANCE LETTER (OUTPATIENT)
Age: 57
End: 2022-09-11

## 2022-10-11 DIAGNOSIS — K21.9 GASTROESOPHAGEAL REFLUX DISEASE WITHOUT ESOPHAGITIS: ICD-10-CM

## 2022-10-12 RX ORDER — FAMOTIDINE 20 MG/1
20 TABLET, FILM COATED ORAL 2 TIMES DAILY PRN
Qty: 180 TABLET | Refills: 1 | Status: SHIPPED | OUTPATIENT
Start: 2022-10-12 | End: 2023-04-04

## 2022-10-12 NOTE — TELEPHONE ENCOUNTER
Medication requested: famotidine (PEPCID) 20 MG tablet  Last office visit: 8/11/22  [facility] appointments: none  Medication last refilled: 6/16/22; 180 + 0 refills  Last qualifying labs: N/A    Prescription approved per [facility] Refill Protocol.    Jg CORTES, RN  10/12/22 3:29 PM

## 2022-10-31 DIAGNOSIS — I10 ESSENTIAL HYPERTENSION: ICD-10-CM

## 2022-11-01 RX ORDER — LISINOPRIL AND HYDROCHLOROTHIAZIDE 20; 25 MG/1; MG/1
TABLET ORAL
Qty: 90 TABLET | Refills: 0 | Status: SHIPPED | OUTPATIENT
Start: 2022-11-01 | End: 2023-01-05

## 2022-11-01 NOTE — CONFIDENTIAL NOTE
Lisinopril-hydrochlorothiazide (ZESTORETIC) 20-25 MG    Last Office Visit: 8/11/22  [facility] Appointments: None  Medication last refilled: 5/2/22 #90 with 1 refill(s)    Vital Signs 10/25/2021 1/17/2022 8/11/2022   Systolic 153 156 106   Diastolic 88 85 68     Required labs per protocol:    LAB REF RANGE 2/24/22 4/26/22   Creatinine 0.8-1.25 mg/dL 0.79 0.83   Potassium 3.4-5.3 mmol/L 4.6 4.6   Sodium 137.3-146.3 mmol/L 140 142     Prescription approved per [facility] Refill Protocol.    MITESH LeijaN, RN, CCM
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
## 2023-01-04 DIAGNOSIS — J45.30 MILD PERSISTENT ASTHMA WITHOUT COMPLICATION: Chronic | ICD-10-CM

## 2023-01-04 DIAGNOSIS — I10 ESSENTIAL HYPERTENSION: ICD-10-CM

## 2023-01-05 ENCOUNTER — HOSPITAL ENCOUNTER (OUTPATIENT)
Dept: MAMMOGRAPHY | Facility: CLINIC | Age: 58
Discharge: HOME | End: 2023-01-05
Attending: FAMILY MEDICINE | Admitting: FAMILY MEDICINE
Payer: COMMERCIAL

## 2023-01-05 DIAGNOSIS — Z12.31 VISIT FOR SCREENING MAMMOGRAM: ICD-10-CM

## 2023-01-05 PROCEDURE — 77067 SCR MAMMO BI INCL CAD: CPT

## 2023-01-05 RX ORDER — LISINOPRIL AND HYDROCHLOROTHIAZIDE 20; 25 MG/1; MG/1
TABLET ORAL
Qty: 90 TABLET | Refills: 1 | Status: SHIPPED | OUTPATIENT
Start: 2023-01-05 | End: 2023-04-25

## 2023-01-05 RX ORDER — ALBUTEROL SULFATE 90 UG/1
1-2 AEROSOL, METERED RESPIRATORY (INHALATION) EVERY 6 HOURS PRN
Qty: 8.5 G | Refills: 3 | Status: SHIPPED | OUTPATIENT
Start: 2023-01-05 | End: 2024-01-16

## 2023-01-05 NOTE — CONFIDENTIAL NOTE
Albuterol 108 (90 Base) mcg/ACT inhaler + Lisinopril-hydrochlorothiazide (Zestoretic) 20-25 mg    Last Office Visit: 8/11/22  [facility] Appointments: 1/6/23  Medication last refilled:     10/25/21 #8.5 g with 11 refill(s)-Albuterol  11/1/22 #90 with 0 refill(s)-Lisinopril-hydrochlorothiazide    Asthma Control Test 10/23/2020 10/25/2021 8/11/2022   ACT Total Score (Goal Greater than or Equal to 20) 21 22 21     Vital Signs 2/25/2022 4/27/2022 8/11/2022   Systolic 145 129 117   Diastolic 82 72 70     Required labs per protocol:    LAB REF RANGE 2/24/22 4/26/22   Creatinine 0.8-1.25 mg/dL 0.79 0.83   Potassium 3.4-5.3 mmol/L 4.6 4.6   Sodium 137.3-146.3 mmol/L 140 142     Prescription approved per [facility] Refill Protocol.    Ginette Blanco, MITESHN, RN, CCM

## 2023-01-06 ENCOUNTER — OFFICE VISIT (OUTPATIENT)
Dept: FAMILY MEDICINE | Facility: CLINIC | Age: 58
End: 2023-01-06
Payer: COMMERCIAL

## 2023-01-06 VITALS
TEMPERATURE: 97.9 F | DIASTOLIC BLOOD PRESSURE: 72 MMHG | HEART RATE: 81 BPM | OXYGEN SATURATION: 94 % | BODY MASS INDEX: 27.66 KG/M2 | WEIGHT: 162.04 LBS | SYSTOLIC BLOOD PRESSURE: 116 MMHG | HEIGHT: 64 IN

## 2023-01-06 DIAGNOSIS — M67.432 GANGLION CYST OF WRIST, LEFT: Primary | ICD-10-CM

## 2023-01-06 DIAGNOSIS — J32.9 PURULENT POSTNASAL DRAINAGE: ICD-10-CM

## 2023-01-06 ASSESSMENT — ASTHMA QUESTIONNAIRES: ACT_TOTALSCORE: 20

## 2023-01-23 ENCOUNTER — HEALTH MAINTENANCE LETTER (OUTPATIENT)
Age: 58
End: 2023-01-23

## 2023-04-04 DIAGNOSIS — K21.9 GASTROESOPHAGEAL REFLUX DISEASE WITHOUT ESOPHAGITIS: ICD-10-CM

## 2023-04-04 RX ORDER — FAMOTIDINE 20 MG/1
20 TABLET, FILM COATED ORAL 2 TIMES DAILY PRN
Qty: 180 TABLET | Refills: 1 | Status: SHIPPED | OUTPATIENT
Start: 2023-04-04 | End: 2024-05-01

## 2023-04-04 NOTE — TELEPHONE ENCOUNTER
Medication requested: famotidine (PEPCID) 20 MG tablet  Last office visit: 1/6/2023  [facility] appointments: none  Medication last refilled: 10/12/2022; 180 + 1 refill  Last qualifying labs: n/a    Prescription approved per [facility] Refill Protocol.    SEBASTIAN Del Rosario, RN  04/04/23, 3:12 PM

## 2023-04-22 NOTE — PROGRESS NOTES
ASSESSMENT AND PLAN:     (I10) Essential hypertension  (primary encounter diagnosis)  Comment: Chronic, stable. Continue current regimen.   Plan: Basic metabolic panel,         lisinopril-hydrochlorothiazide (ZESTORETIC)         20-25 MG tablet          (R22.32) Mass of left wrist  Comment: Chronic, stable. Original diagnosis of ganglion cyst based on exam and successful aspiration of synovial fluid from it. Has returned and is much firmer this time. Will check US just to confirm that this wasn't a more complex/concerning mass.   Plan: US [facility]          (Z23) Need for vaccination for Strep pneumoniae  Comment: Plan: PNEUMOCOCCAL 20 VALENT CONJUGATE (PREVNAR 20)          (Z11.59) Encounter for hepatitis C screening test for low risk patient  Comment: Plan: Hepatitis C Screen Reflex to HCV RNA Quant and         Genotype          (Z86.39) History of vitamin D deficiency  Comment: Many years ago. Not on supplement.   Plan: Vitamin D Deficiency            Lee Dong MD   [facility]  04/25/2023, 1:15 PM      SUBJECTIVE:   Susie is a 57 year old female who presents to clinic today for a return visit.    # Hypertension   BP Readings from Last 5 Encounters:   04/25/23 134/83   01/06/23 116/72   08/11/22 106/68   04/27/22 129/72   02/25/22 (!) 145/82     Creatinine   Date Value Ref Range Status   04/26/2022 0.83 0.52 - 1.04 mg/dL Final   02/24/2022 0.79 0.52 - 1.04 mg/dL Final     Sodium   Date Value Ref Range Status   04/26/2022 142 133 - 144 mmol/L Final     Potassium   Date Value Ref Range Status   04/26/2022 4.6 3.4 - 5.3 mmol/L Final     - Current Regimen: Lisinopril-hydrochlorothiazide 20-25mg daily  - Missed doses?: had a few recently with travel, has otherwise been consistent    - has had some disruptions with the move to [place] but still regularly active    - occasionally very short lived orthostatic dizziness if she crouches     # Wrist Cyst  - aspirated last visit  - has  returned somewhat, firmer  - not bothersome    # Asthma  - Identified Triggers: exercise, seasonal allergies, cat allergies, URIs  - Controller Medications: Loratadine during spring-/summer/fall  - takes albuterol 2 puffs before exercise every day, never has to use for rescue inhaler purposes        Patient Active Problem List   Diagnosis     Mild persistent asthma without complication     Carcinoid tumor     Gastroesophageal reflux disease, esophagitis presence not specified     Cat allergies     Surveillance of intrauterine contraceptive device     Screening for colon cancer     Umbilical hernia without obstruction and without gangrene     Essential hypertension     Current Outpatient Medications   Medication     albuterol (PROAIR HFA/PROVENTIL HFA/VENTOLIN HFA) 108 (90 Base) MCG/ACT inhaler     famotidine (PEPCID) 20 MG tablet     fluticasone (FLONASE) 50 MCG/ACT nasal spray     lisinopril-hydrochlorothiazide (ZESTORETIC) 20-25 MG tablet     loratadine (CLARITIN) 10 MG tablet     Multiple Vitamins-Minerals (MULTIVITAMIN ADULT PO)     Probiotic Product (PROBIOTIC DAILY PO)     UNABLE TO FIND     No current facility-administered medications for this visit.       I have reviewed the patient's relevant past medical history.     OBJECTIVE:   /83 (BP Location: Left arm)   Pulse 80   Temp 97.5  F (36.4  C)   Wt 73 kg (161 lb)   SpO2 96%   BMI 27.83 kg/m      Constitutional: well-appearing, appears stated age  Eyes: conjunctivae without erythema, sclera anicteric.     Left wrist: approx 1cm firm nodule anterior wrist, transilluminates

## 2023-04-25 ENCOUNTER — OFFICE VISIT (OUTPATIENT)
Dept: FAMILY MEDICINE | Facility: CLINIC | Age: 58
End: 2023-04-25
Payer: COMMERCIAL

## 2023-04-25 VITALS
BODY MASS INDEX: 27.83 KG/M2 | DIASTOLIC BLOOD PRESSURE: 83 MMHG | WEIGHT: 161 LBS | OXYGEN SATURATION: 96 % | SYSTOLIC BLOOD PRESSURE: 134 MMHG | TEMPERATURE: 97.5 F | HEART RATE: 80 BPM

## 2023-04-25 DIAGNOSIS — I10 ESSENTIAL HYPERTENSION: Primary | Chronic | ICD-10-CM

## 2023-04-25 DIAGNOSIS — Z86.39 HISTORY OF VITAMIN D DEFICIENCY: ICD-10-CM

## 2023-04-25 DIAGNOSIS — R22.32 MASS OF LEFT WRIST: ICD-10-CM

## 2023-04-25 DIAGNOSIS — Z11.59 ENCOUNTER FOR HEPATITIS C SCREENING TEST FOR LOW RISK PATIENT: ICD-10-CM

## 2023-04-25 DIAGNOSIS — Z23 NEED FOR VACCINATION FOR STREP PNEUMONIAE: ICD-10-CM

## 2023-04-25 PROBLEM — D3A.00 CARCINOID TUMOR (H): Chronic | Status: ACTIVE | Noted: 2017-04-11

## 2023-04-25 LAB
ANION GAP SERPL CALCULATED.3IONS-SCNC: 14 MMOL/L (ref 7–15)
BUN SERPL-MCNC: 15.1 MG/DL (ref 6–20)
CALCIUM SERPL-MCNC: 9.9 MG/DL (ref 8.6–10)
CHLORIDE SERPL-SCNC: 101 MMOL/L (ref 98–107)
CREAT SERPL-MCNC: 0.97 MG/DL (ref 0.51–0.95)
DEPRECATED HCO3 PLAS-SCNC: 26 MMOL/L (ref 22–29)
GFR SERPL CREATININE-BSD FRML MDRD: 68 ML/MIN/1.73M2
GLUCOSE SERPL-MCNC: 106 MG/DL (ref 70–99)
POTASSIUM SERPL-SCNC: 4.4 MMOL/L (ref 3.4–5.3)
SODIUM SERPL-SCNC: 141 MMOL/L (ref 136–145)

## 2023-04-25 PROCEDURE — 80048 BASIC METABOLIC PNL TOTAL CA: CPT | Mod: ORL | Performed by: FAMILY MEDICINE

## 2023-04-25 PROCEDURE — 82306 VITAMIN D 25 HYDROXY: CPT | Mod: ORL | Performed by: FAMILY MEDICINE

## 2023-04-25 PROCEDURE — 86803 HEPATITIS C AB TEST: CPT | Mod: ORL | Performed by: FAMILY MEDICINE

## 2023-04-25 RX ORDER — LISINOPRIL AND HYDROCHLOROTHIAZIDE 20; 25 MG/1; MG/1
1 TABLET ORAL DAILY
Qty: 90 TABLET | Refills: 3 | Status: SHIPPED | OUTPATIENT
Start: 2023-04-25 | End: 2024-01-16

## 2023-04-25 NOTE — NURSING NOTE
57 year old  Chief Complaint   Patient presents with     Hypertension     Bp check in       Blood pressure (!) 144/78, pulse 80, temperature 97.5  F (36.4  C), weight 73 kg (161 lb), SpO2 96 %, not currently breastfeeding. Body mass index is 27.83 kg/m .  Patient Active Problem List   Diagnosis     Mild persistent asthma without complication     Carcinoid tumor     Gastroesophageal reflux disease, esophagitis presence not specified     Cat allergies     Surveillance of intrauterine contraceptive device     Screening for colon cancer     Umbilical hernia without obstruction and without gangrene     Essential hypertension       Wt Readings from Last 2 Encounters:   04/25/23 73 kg (161 lb)   01/06/23 73.5 kg (162 lb 0.6 oz)     BP Readings from Last 3 Encounters:   04/25/23 (!) 144/78   01/06/23 116/72   08/11/22 106/68         Current Outpatient Medications   Medication     albuterol (PROAIR HFA/PROVENTIL HFA/VENTOLIN HFA) 108 (90 Base) MCG/ACT inhaler     famotidine (PEPCID) 20 MG tablet     fluticasone (FLONASE) 50 MCG/ACT nasal spray     lisinopril-hydrochlorothiazide (ZESTORETIC) 20-25 MG tablet     loratadine (CLARITIN) 10 MG tablet     Multiple Vitamins-Minerals (MULTIVITAMIN ADULT PO)     Probiotic Product (PROBIOTIC DAILY PO)     No current facility-administered medications for this visit.       Social History     Tobacco Use     Smoking status: Never     Smokeless tobacco: Never   Substance Use Topics     Alcohol use: Yes     Alcohol/week: 2.0 standard drinks of alcohol     Comment: 1 glasses of wine per week     Drug use: No       Health Maintenance Due   Topic Date Due     ADVANCE CARE PLANNING  Never done     HEPATITIS B IMMUNIZATION (1 of 3 - 3-dose series) Never done     HEPATITIS C SCREENING  Never done     Pneumococcal Vaccine: Pediatrics (0 to 5 Years) and At-Risk Patients (6 to 64 Years) (2 - PCV) 10/23/2021     YEARLY PREVENTIVE VISIT  10/25/2022     ASTHMA ACTION PLAN  10/25/2022     HPV TEST   06/01/2023     PAP  06/01/2023       No results found for: PAP      April 25, 2023 11:04 AM

## 2023-04-25 NOTE — NURSING NOTE
Prior to immunization administration, verified patients identity using patient s name and date of birth. Please see Immunization Activity for additional information.     Screening Questionnaire for Adult Immunization    Are you sick today?   No   Do you have allergies to medications, food, a vaccine component or latex?   No   Have you ever had a serious reaction after receiving a vaccination?   No   Do you have a long-term health problem with heart, lung, kidney, or metabolic disease (e.g., diabetes), asthma, a blood disorder, no spleen, complement component deficiency, a cochlear implant, or a spinal fluid leak?  Are you on long-term aspirin therapy?   No   Do you have cancer, leukemia, HIV/AIDS, or any other immune system problem?   No   Do you have a parent, brother, or sister with an immune system problem?   No   In the past 3 months, have you taken medications that affect  your immune system, such as prednisone, other steroids, or anticancer drugs; drugs for the treatment of rheumatoid arthritis, Crohn s disease, or psoriasis; or have you had radiation treatments?   No   Have you had a seizure, or a brain or other nervous system problem?   No   During the past year, have you received a transfusion of blood or blood    products, or been given immune (gamma) globulin or antiviral drug?   No   For women: Are you pregnant or is there a chance you could become       pregnant during the next month?   No   Have you received any vaccinations in the past 4 weeks?   No     Immunization questionnaire answers were all negative.      Injection of Prevnar 20 given by Estela Clark MA. Patient instructed to remain in clinic for 15 minutes afterwards, and to report any adverse reactions.     Screening performed by Estela Clark MA on 4/25/2023 at 11:49 AM.

## 2023-04-26 LAB
DEPRECATED CALCIDIOL+CALCIFEROL SERPL-MC: 34 UG/L (ref 20–75)
HCV AB SERPL QL IA: NONREACTIVE

## 2023-06-20 ENCOUNTER — HOSPITAL ENCOUNTER (OUTPATIENT)
Dept: ULTRASOUND IMAGING | Facility: CLINIC | Age: 58
Discharge: HOME | End: 2023-06-20
Attending: FAMILY MEDICINE | Admitting: FAMILY MEDICINE
Payer: COMMERCIAL

## 2023-06-20 DIAGNOSIS — R22.32 MASS OF LEFT WRIST: ICD-10-CM

## 2023-06-20 PROCEDURE — 76882 US LMTD JT/FCL EVL NVASC XTR: CPT | Mod: LT

## 2024-01-14 NOTE — PROGRESS NOTES
ASSESSMENT AND PLAN:     (J45.30) Mild persistent asthma without complication  (primary encounter diagnosis)  Comment: Chronic, stable.   Continue albuterol PRN.  Susie sometimes has fluctuations in her respiratory capacity and wonders if it is asthma related. Will have her get a peak flow meter, establish a post-albuterol baseline on good days, and then re-assess as needed.   Plan: albuterol (PROAIR HFA/PROVENTIL HFA/VENTOLIN         HFA) 108 (90 Base) MCG/ACT inhaler, Other         Respiratory Supplies for DME - ONLY FOR DME          (R79.89) Creatinine elevation  Comment: Acute, uncomplicated  Plan: Found last year on routine monitoring labs - update today.  BMP    (Z13.220) Screening cholesterol level  Comment: Lipid panel    (I10) Essential hypertension  Comment: Chronic, stable.  Continue home monitoring. If consistently seeing BP <110/70, would decrease dose.   Plan: lisinopril-hydrochlorothiazide (ZESTORETIC)         20-25 MG tablet                    Lee Dong MD   [facility]  01/16/2024, 10:27 AM      SUBJECTIVE:   Susie is a 58 year old female who presents to clinic today for a return visit.    Leaving for [place] this Sunday    # Hypertension   BP Readings from Last 5 Encounters:   01/16/24 119/71   04/25/23 134/83   01/06/23 116/72   08/11/22 106/68   04/27/22 129/72     Creatinine   Date Value Ref Range Status   04/25/2023 0.97 (H) 0.51 - 0.95 mg/dL Final   04/26/2022 0.83 0.52 - 1.04 mg/dL Final     Sodium   Date Value Ref Range Status   04/25/2023 141 136 - 145 mmol/L Final     Potassium   Date Value Ref Range Status   04/25/2023 4.4 3.4 - 5.3 mmol/L Final   04/26/2022 4.6 3.4 - 5.3 mmol/L Final     - Current Regimen: Lisinopril-hydrochlorothiazide 20-25mg daily   - hasn't checked BP at home recently  - not often orthostasis, just once or twice in the past month    # Asthma  - Exacerbations Requiring Steroids in Past Year: no  - Hospitalizations/Intubations?: no  -  "Identified Triggers: exercise, seasonal allergies, cat allergies, URIs, air pollution  - Controller Medications: Loratadine during spring-/summer/fall  - Rescue Inhaler Use Per Week: 1 puff of albuterol every morning  - Medication Side Effects: n/a        10/25/2021     2:21 PM 8/11/2022    10:00 AM 1/6/2023     4:26 PM   ACT Total Scores   ACT TOTAL SCORE (Goal Greater than or Equal to 20) 22 21 20   In the past 12 months, how many times did you visit the emergency room for your asthma without being admitted to the hospital? 0 0 0   In the past 12 months, how many times were you hospitalized overnight because of your asthma? 0 0 0     # HM  - goes to [facility]      Patient Active Problem List   Diagnosis    Mild persistent asthma without complication    Carcinoid tumor (H28)    Gastroesophageal reflux disease, esophagitis presence not specified    Cat allergies    Surveillance of intrauterine contraceptive device    Screening for colon cancer    Umbilical hernia without obstruction and without gangrene    Essential hypertension     Current Outpatient Medications   Medication    albuterol (PROAIR HFA/PROVENTIL HFA/VENTOLIN HFA) 108 (90 Base) MCG/ACT inhaler    famotidine (PEPCID) 20 MG tablet    fluticasone (FLONASE) 50 MCG/ACT nasal spray    lisinopril-hydrochlorothiazide (ZESTORETIC) 20-25 MG tablet    loratadine (CLARITIN) 10 MG tablet    Multiple Vitamins-Minerals (MULTIVITAMIN ADULT PO)    Probiotic Product (PROBIOTIC DAILY PO)    UNABLE TO FIND     No current facility-administered medications for this visit.       I have reviewed the patient's relevant past medical history.     OBJECTIVE:   /71   Pulse 73   Temp 97.3  F (36.3  C)   Ht 1.651 m (5' 5\")   Wt 74.9 kg (165 lb 1.3 oz)   SpO2 99%   BMI 27.47 kg/m      Constitutional: well-appearing, appears stated age  Eyes: conjunctivae without erythema, sclera anicteric.   Skin: no rashes, lesions, or wounds  Psych: affect is full and appropriate, " speech is fluent and non-pressured

## 2024-01-16 ENCOUNTER — OFFICE VISIT (OUTPATIENT)
Dept: FAMILY MEDICINE | Facility: CLINIC | Age: 59
End: 2024-01-16
Payer: COMMERCIAL

## 2024-01-16 VITALS
TEMPERATURE: 97.3 F | SYSTOLIC BLOOD PRESSURE: 119 MMHG | WEIGHT: 165.08 LBS | DIASTOLIC BLOOD PRESSURE: 71 MMHG | HEART RATE: 73 BPM | HEIGHT: 65 IN | OXYGEN SATURATION: 99 % | BODY MASS INDEX: 27.5 KG/M2

## 2024-01-16 DIAGNOSIS — Z13.220 SCREENING CHOLESTEROL LEVEL: ICD-10-CM

## 2024-01-16 DIAGNOSIS — J45.30 MILD PERSISTENT ASTHMA WITHOUT COMPLICATION: Primary | Chronic | ICD-10-CM

## 2024-01-16 DIAGNOSIS — I10 ESSENTIAL HYPERTENSION: Chronic | ICD-10-CM

## 2024-01-16 LAB
ANION GAP SERPL CALCULATED.3IONS-SCNC: 10 MMOL/L (ref 7–15)
BUN SERPL-MCNC: 14.5 MG/DL (ref 6–20)
CALCIUM SERPL-MCNC: 9.4 MG/DL (ref 8.6–10)
CHLORIDE SERPL-SCNC: 103 MMOL/L (ref 98–107)
CHOLEST SERPL-MCNC: 222 MG/DL
CREAT SERPL-MCNC: 0.83 MG/DL (ref 0.51–0.95)
DEPRECATED HCO3 PLAS-SCNC: 26 MMOL/L (ref 22–29)
EGFRCR SERPLBLD CKD-EPI 2021: 82 ML/MIN/1.73M2
FASTING STATUS PATIENT QL REPORTED: YES
GLUCOSE SERPL-MCNC: 110 MG/DL (ref 70–99)
HDLC SERPL-MCNC: 52 MG/DL
LDLC SERPL CALC-MCNC: 145 MG/DL
NONHDLC SERPL-MCNC: 170 MG/DL
POTASSIUM SERPL-SCNC: 4 MMOL/L (ref 3.4–5.3)
SODIUM SERPL-SCNC: 139 MMOL/L (ref 135–145)
TRIGL SERPL-MCNC: 126 MG/DL

## 2024-01-16 PROCEDURE — 80048 BASIC METABOLIC PNL TOTAL CA: CPT | Mod: ORL | Performed by: FAMILY MEDICINE

## 2024-01-16 PROCEDURE — 80061 LIPID PANEL: CPT | Mod: ORL | Performed by: FAMILY MEDICINE

## 2024-01-16 RX ORDER — ALBUTEROL SULFATE 90 UG/1
1-2 AEROSOL, METERED RESPIRATORY (INHALATION) EVERY 6 HOURS PRN
Qty: 8.5 G | Refills: 3 | Status: SHIPPED | OUTPATIENT
Start: 2024-01-16 | End: 2024-05-01

## 2024-01-16 RX ORDER — LISINOPRIL AND HYDROCHLOROTHIAZIDE 20; 25 MG/1; MG/1
1 TABLET ORAL DAILY
Qty: 90 TABLET | Refills: 3 | Status: SHIPPED | OUTPATIENT
Start: 2024-01-16

## 2024-01-16 ASSESSMENT — ASTHMA QUESTIONNAIRES
ACT_TOTALSCORE: 22
QUESTION_4 LAST FOUR WEEKS HOW OFTEN HAVE YOU USED YOUR RESCUE INHALER OR NEBULIZER MEDICATION (SUCH AS ALBUTEROL): ONE OR TWO TIMES PER DAY
QUESTION_3 LAST FOUR WEEKS HOW OFTEN DID YOUR ASTHMA SYMPTOMS (WHEEZING, COUGHING, SHORTNESS OF BREATH, CHEST TIGHTNESS OR PAIN) WAKE YOU UP AT NIGHT OR EARLIER THAN USUAL IN THE MORNING: NOT AT ALL
QUESTION_1 LAST FOUR WEEKS HOW MUCH OF THE TIME DID YOUR ASTHMA KEEP YOU FROM GETTING AS MUCH DONE AT WORK, SCHOOL OR AT HOME: NONE OF THE TIME
ACUTE_EXACERBATION_TODAY: NO
ACT_TOTALSCORE: 22
QUESTION_2 LAST FOUR WEEKS HOW OFTEN HAVE YOU HAD SHORTNESS OF BREATH: NOT AT ALL
QUESTION_5 LAST FOUR WEEKS HOW WOULD YOU RATE YOUR ASTHMA CONTROL: COMPLETELY CONTROLLED

## 2024-01-16 NOTE — LETTER
My Asthma Action Plan    Name: Susie Dupont   YOB: 1965  Date: 1/16/2024   My doctor: Lee Dong MD   My clinic: [facility]        My Rescue Medicine:   Albuterol inhaler (Proair/Ventolin/Proventil HFA)  2-4 puffs EVERY 4 HOURS as needed. Use a spacer if recommended by your provider.   My Asthma Severity:   Intermittent / Exercise Induced  Know your asthma triggers: upper respiratory infections, pollens, animal dander, and exercise or sports             GREEN ZONE   Good Control  I feel good  No cough or wheeze  Can work, sleep and play without asthma symptoms       If exercise triggers your asthma, take your rescue medication  15 minutes before exercise or sports, and  During exercise if you have asthma symptoms  2.    Spacer to use with inhaler: If you have a spacer, make sure to use it with your inhaler             YELLOW ZONE Getting Worse  I have ANY of these:  I do not feel good  Cough or wheeze  Chest feels tight  Wake up at night   Keep taking your Green Zone medications  Start taking your rescue medicine:  every 20 minutes for up to 1 hour. Then every 4 hours for 24-48 hours.  If you stay in the Yellow Zone for more than 12-24 hours, contact your doctor.  If you do not return to the Green Zone in 12-24 hours or you get worse, start taking your oral steroid medicine if prescribed by your provider.           RED ZONE Medical Alert - Get Help  I have ANY of these:  I feel awful  Medicine is not helping  Breathing getting harder  Trouble walking or talking  Nose opens wide to breathe       Take your rescue medicine NOW  If your provider has prescribed an oral steroid medicine, start taking it NOW  Call your doctor NOW  If you are still in the Red Zone after 20 minutes and you have not reached your doctor:  Take your rescue medicine again and  Call 911 or go to the emergency room right away    See your regular doctor within 2 weeks of an Emergency Room or Urgent Care  visit for follow-up treatment.          Annual Reminders:  Meet with Asthma Educator,  Flu Shot in the Fall, consider Pneumonia Vaccination for patients with asthma (aged 19 and older).    Pharmacy:    [id number] [place], [place] - [address]  [id number] [place], [place] - [address]  [id number] - [place], [place] - [address]    Electronically signed by Lee Dong MD   Date: 01/16/24                    Asthma Triggers  How To Control Things That Make Your Asthma Worse    Triggers are things that make your asthma worse.  Look at the list below to help you find your triggers and   what you can do about them. You can help prevent asthma flare-ups by staying away from your triggers.      Trigger                                                          What you can do   Cigarette Smoke  Tobacco smoke can make asthma worse. Do not allow smoking in your home, car or around you.  Be sure no one smokes at a child s day care or school.  If you smoke, ask your health care provider for ways to help you quit.  Ask family members to quit too.  Ask your health care provider for a referral to Quit Plan to help you quit smoking, or call 7-841-057-PLAN.     Colds, Flu, Bronchitis  These are common triggers of asthma. Wash your hands often.  Don t touch your eyes, nose or mouth.  Get a flu shot every year.     Dust Mites  These are tiny bugs that live in cloth or carpet. They are too small to see. Wash sheets and blankets in hot water every week.   Encase pillows and mattress in dust mite proof covers.  Avoid having carpet if you can. If you have carpet, vacuum weekly.   Use a dust mask and HEPA vacuum.   Pollen and Outdoor Mold  Some people are allergic to trees, grass, or weed pollen, or molds. Try to keep your windows closed.  Limit time out doors when pollen count is high.   Ask you health care provider about taking medicine during allergy season.     Animal Dander  Some people are  allergic to skin flakes, urine or saliva from pets with fur or feathers. Keep pets with fur or feathers out of your home.    If you can t keep the pet outdoors, then keep the pet out of your bedroom.  Keep the bedroom door closed.  Keep pets off cloth furniture and away from stuffed toys.     Mice, Rats, and Cockroaches  Some people are allergic to the waste from these pests.   Cover food and garbage.  Clean up spills and food crumbs.  Store grease in the refrigerator.   Keep food out of the bedroom.   Indoor Mold  This can be a trigger if your home has high moisture. Fix leaking faucets, pipes, or other sources of water.   Clean moldy surfaces.  Dehumidify basement if it is damp and smelly.   Smoke, Strong Odors, and Sprays  These can reduce air quality. Stay away from strong odors and sprays, such as perfume, powder, hair spray, paints, smoke incense, paint, cleaning products, candles and new carpet.   Exercise or Sports  Some people with asthma have this trigger. Be active!  Ask your doctor about taking medicine before sports or exercise to prevent symptoms.    Warm up for 5-10 minutes before and after sports or exercise.     Other Triggers of Asthma  Cold air:  Cover your nose and mouth with a scarf.  Sometimes laughing or crying can be a trigger.  Some medicines and food can trigger asthma.

## 2024-01-16 NOTE — PATIENT INSTRUCTIONS
Check your blood pressure a few times at time  If you are typically less than 110/70 (either number), let me know so we can decrease your medications    Ask your gynecologist to fax us your most recent Pap smear   Our fax number is [phone]

## 2024-01-17 PROBLEM — R73.01 ELEVATED FASTING GLUCOSE: Status: ACTIVE | Noted: 2024-01-17

## 2024-01-18 ENCOUNTER — TRANSFERRED RECORDS (OUTPATIENT)
Dept: HEALTH INFORMATION MANAGEMENT | Facility: CLINIC | Age: 59
End: 2024-01-18
Payer: COMMERCIAL

## 2024-01-19 ENCOUNTER — LAB (OUTPATIENT)
Dept: LAB | Facility: CLINIC | Age: 59
End: 2024-01-19
Payer: COMMERCIAL

## 2024-01-19 DIAGNOSIS — R73.01 ELEVATED FASTING GLUCOSE: ICD-10-CM

## 2024-01-19 LAB — HBA1C MFR BLD: 5.8 % (ref 0–5.6)

## 2024-02-24 ENCOUNTER — HEALTH MAINTENANCE LETTER (OUTPATIENT)
Age: 59
End: 2024-02-24

## 2024-03-05 ENCOUNTER — HOSPITAL ENCOUNTER (OUTPATIENT)
Dept: MAMMOGRAPHY | Facility: CLINIC | Age: 59
Discharge: HOME | End: 2024-03-05
Attending: FAMILY MEDICINE | Admitting: FAMILY MEDICINE
Payer: COMMERCIAL

## 2024-03-05 ENCOUNTER — TELEPHONE (OUTPATIENT)
Dept: FAMILY MEDICINE | Facility: CLINIC | Age: 59
End: 2024-03-05

## 2024-03-05 DIAGNOSIS — Z12.31 VISIT FOR SCREENING MAMMOGRAM: ICD-10-CM

## 2024-03-05 PROCEDURE — 77063 BREAST TOMOSYNTHESIS BI: CPT

## 2024-03-05 NOTE — TELEPHONE ENCOUNTER
Received call from Susie with request for mammography order. She had screening mammogram done today which identified a possible mass in her left breast at the 6 o'clock position. She will need follow-up diagnostic mammogram and left breast ultrasound. She travels frequently to [place] and will be leaving [place] on Friday - she is worried about needing further imaging and being able to get it in time. Shared that we would do our best to accommodate her schedule but did not make guarantee.    Spoke with team at [facility] who explained that their call-back team will be in touch with Susie soon regarding next steps for imaging.    Jg Wright - SEBASTIAN, RN  03/05/24 5:06 PM

## 2024-03-06 ENCOUNTER — LAB REQUISITION (OUTPATIENT)
Dept: LAB | Facility: CLINIC | Age: 59
End: 2024-03-06
Payer: COMMERCIAL

## 2024-03-06 DIAGNOSIS — Z12.4 ENCOUNTER FOR SCREENING FOR MALIGNANT NEOPLASM OF CERVIX: ICD-10-CM

## 2024-03-06 PROCEDURE — G0145 SCR C/V CYTO,THINLAYER,RESCR: HCPCS | Mod: ORL | Performed by: OBSTETRICS & GYNECOLOGY

## 2024-03-06 PROCEDURE — 87624 HPV HI-RISK TYP POOLED RSLT: CPT | Mod: ORL | Performed by: OBSTETRICS & GYNECOLOGY

## 2024-03-07 ENCOUNTER — ANCILLARY PROCEDURE (OUTPATIENT)
Dept: MAMMOGRAPHY | Facility: CLINIC | Age: 59
End: 2024-03-07
Attending: FAMILY MEDICINE
Payer: COMMERCIAL

## 2024-03-07 DIAGNOSIS — N64.89 BREAST ASYMMETRY: ICD-10-CM

## 2024-03-07 PROCEDURE — 76642 ULTRASOUND BREAST LIMITED: CPT | Mod: LT | Performed by: STUDENT IN AN ORGANIZED HEALTH CARE EDUCATION/TRAINING PROGRAM

## 2024-03-07 PROCEDURE — 77065 DX MAMMO INCL CAD UNI: CPT | Mod: LT | Performed by: STUDENT IN AN ORGANIZED HEALTH CARE EDUCATION/TRAINING PROGRAM

## 2024-03-07 PROCEDURE — G0279 TOMOSYNTHESIS, MAMMO: HCPCS | Mod: LT | Performed by: STUDENT IN AN ORGANIZED HEALTH CARE EDUCATION/TRAINING PROGRAM

## 2024-03-11 LAB
BKR LAB AP GYN ADEQUACY: NORMAL
BKR LAB AP GYN INTERPRETATION: NORMAL
BKR LAB AP HPV REFLEX: NORMAL
BKR LAB AP LMP: NORMAL
BKR LAB AP PREVIOUS ABNL DX: NORMAL
BKR LAB AP PREVIOUS ABNORMAL: NORMAL
PATH REPORT.COMMENTS IMP SPEC: NORMAL
PATH REPORT.COMMENTS IMP SPEC: NORMAL
PATH REPORT.RELEVANT HX SPEC: NORMAL

## 2024-03-12 LAB
HUMAN PAPILLOMA VIRUS 16 DNA: NEGATIVE
HUMAN PAPILLOMA VIRUS 18 DNA: NEGATIVE
HUMAN PAPILLOMA VIRUS FINAL DIAGNOSIS: NORMAL
HUMAN PAPILLOMA VIRUS OTHER HR: NEGATIVE

## 2024-05-01 ENCOUNTER — OFFICE VISIT (OUTPATIENT)
Dept: FAMILY MEDICINE | Facility: CLINIC | Age: 59
End: 2024-05-01
Payer: COMMERCIAL

## 2024-05-01 VITALS
RESPIRATION RATE: 17 BRPM | SYSTOLIC BLOOD PRESSURE: 115 MMHG | HEART RATE: 71 BPM | OXYGEN SATURATION: 98 % | DIASTOLIC BLOOD PRESSURE: 75 MMHG | BODY MASS INDEX: 26.83 KG/M2 | TEMPERATURE: 98.1 F | WEIGHT: 161.25 LBS

## 2024-05-01 DIAGNOSIS — K21.9 GASTROESOPHAGEAL REFLUX DISEASE WITHOUT ESOPHAGITIS: ICD-10-CM

## 2024-05-01 DIAGNOSIS — J45.30 MILD PERSISTENT ASTHMA WITHOUT COMPLICATION: Chronic | ICD-10-CM

## 2024-05-01 RX ORDER — ALBUTEROL SULFATE 90 UG/1
1-2 AEROSOL, METERED RESPIRATORY (INHALATION) EVERY 6 HOURS PRN
Qty: 8.5 G | Refills: 3 | Status: SHIPPED | OUTPATIENT
Start: 2024-05-01

## 2024-05-01 RX ORDER — FLUTICASONE FUROATE AND VILANTEROL 100; 25 UG/1; UG/1
1 POWDER RESPIRATORY (INHALATION) DAILY
Qty: 30 EACH | Refills: 3 | Status: SHIPPED | OUTPATIENT
Start: 2024-05-01 | End: 2024-06-26

## 2024-05-01 RX ORDER — FAMOTIDINE 20 MG/1
20 TABLET, FILM COATED ORAL 2 TIMES DAILY PRN
Qty: 180 TABLET | Refills: 1 | Status: SHIPPED | OUTPATIENT
Start: 2024-05-01

## 2024-05-01 ASSESSMENT — ANXIETY QUESTIONNAIRES
IF YOU CHECKED OFF ANY PROBLEMS ON THIS QUESTIONNAIRE, HOW DIFFICULT HAVE THESE PROBLEMS MADE IT FOR YOU TO DO YOUR WORK, TAKE CARE OF THINGS AT HOME, OR GET ALONG WITH OTHER PEOPLE: NOT DIFFICULT AT ALL
4. TROUBLE RELAXING: NOT AT ALL
7. FEELING AFRAID AS IF SOMETHING AWFUL MIGHT HAPPEN: NOT AT ALL
3. WORRYING TOO MUCH ABOUT DIFFERENT THINGS: SEVERAL DAYS
7. FEELING AFRAID AS IF SOMETHING AWFUL MIGHT HAPPEN: NOT AT ALL
5. BEING SO RESTLESS THAT IT IS HARD TO SIT STILL: NOT AT ALL
GAD7 TOTAL SCORE: 3
GAD7 TOTAL SCORE: 3
1. FEELING NERVOUS, ANXIOUS, OR ON EDGE: SEVERAL DAYS
8. IF YOU CHECKED OFF ANY PROBLEMS, HOW DIFFICULT HAVE THESE MADE IT FOR YOU TO DO YOUR WORK, TAKE CARE OF THINGS AT HOME, OR GET ALONG WITH OTHER PEOPLE?: NOT DIFFICULT AT ALL
6. BECOMING EASILY ANNOYED OR IRRITABLE: NOT AT ALL
GAD7 TOTAL SCORE: 3
2. NOT BEING ABLE TO STOP OR CONTROL WORRYING: SEVERAL DAYS

## 2024-05-01 ASSESSMENT — PATIENT HEALTH QUESTIONNAIRE - PHQ9
SUM OF ALL RESPONSES TO PHQ QUESTIONS 1-9: 0
SUM OF ALL RESPONSES TO PHQ QUESTIONS 1-9: 0

## 2024-05-01 ASSESSMENT — ASTHMA QUESTIONNAIRES
QUESTION_5 LAST FOUR WEEKS HOW WOULD YOU RATE YOUR ASTHMA CONTROL: SOMEWHAT CONTROLLED
ACT_TOTALSCORE: 17
QUESTION_1 LAST FOUR WEEKS HOW MUCH OF THE TIME DID YOUR ASTHMA KEEP YOU FROM GETTING AS MUCH DONE AT WORK, SCHOOL OR AT HOME: SOME OF THE TIME
QUESTION_4 LAST FOUR WEEKS HOW OFTEN HAVE YOU USED YOUR RESCUE INHALER OR NEBULIZER MEDICATION (SUCH AS ALBUTEROL): ONE OR TWO TIMES PER DAY
ACT_TOTALSCORE: 17
QUESTION_2 LAST FOUR WEEKS HOW OFTEN HAVE YOU HAD SHORTNESS OF BREATH: NOT AT ALL
QUESTION_3 LAST FOUR WEEKS HOW OFTEN DID YOUR ASTHMA SYMPTOMS (WHEEZING, COUGHING, SHORTNESS OF BREATH, CHEST TIGHTNESS OR PAIN) WAKE YOU UP AT NIGHT OR EARLIER THAN USUAL IN THE MORNING: ONCE OR TWICE

## 2024-05-01 NOTE — PROGRESS NOTES
ASSESSMENT and PLAN:     Asthma  Suggested trying Breo inhaler. Will start on the low dose. May increase to higher dose as needed  Continue Albuterol but only as needed.   Anxiety, now under control. She has learned how to calm herself down with breathing exercises.   Hypertension, under control   GERD   Refilled Famotidine      Follow-up in about 3-6 months to assess A1C and follow up.     Pablo Vaughn MD  Family Medicine and Sports Medicine  [facility]      Medical assistant intake:  Susie Dupont is a 58 year old female who presents to [facility] today for Asthma (Wondering if related to current stress/anxiety. Hx of pollen allergies. Uses albuterol every morning prior to exercise.), Follow Up (Anxiety -- was having sweaty palms occ, waking at night without being able to go back to sleep.), and Hypertension (Has bene monitoring at home past couple of days. 114/65 4/29, 127/70 this morning)      SUBJECTIVE:   This is my first time meeting Susie. She's a 59 yo with asthma, allergies and HTN.   Was cared for by Dr. Dong.   Here today to discuss above. No major changes.   Takes Claritin daily all year. Also, uses Albuteral inhaler daily in the morning. Used to use a steroid inhaler (Flovent).    Also, here as she had some anxiety after a stressful work related event and was travelling and answering colleagues questions. Plus, had a friend die in [place]. She was feeling sweaty. The anxiety symptoms have improved over the past few days. She knows that slow deep breaths help her.     Review Of Systems:    See subjective.   Has otherwise been in usual state of health, e.g.   Gastrointestinal: negative  Genitourinary: negative    Problem list per EMR:  Patient Active Problem List   Diagnosis    Mild persistent asthma without complication    Carcinoid tumor (H28)    Gastroesophageal reflux disease, esophagitis presence not specified    Cat allergies    Surveillance of intrauterine contraceptive  device    Screening for colon cancer    Umbilical hernia without obstruction and without gangrene    Essential hypertension    Elevated fasting glucose       Current Outpatient Medications   Medication Sig Dispense Refill    albuterol (PROAIR HFA/PROVENTIL HFA/VENTOLIN HFA) 108 (90 Base) MCG/ACT inhaler Inhale 1-2 puffs into the lungs every 6 hours as needed for shortness of breath or wheezing 8.5 g 3    famotidine (PEPCID) 20 MG tablet TAKE 1 TABLET (20 MG) BY MOUTH 2 TIMES DAILY AS NEEDED (BLOATING OR REFLUX) 180 tablet 1    fluticasone (FLONASE) 50 MCG/ACT nasal spray Spray 2 sprays into both nostrils daily 9.9 mL 1    lisinopril-hydrochlorothiazide (ZESTORETIC) 20-25 MG tablet Take 1 tablet by mouth daily 90 tablet 3    loratadine (CLARITIN) 10 MG tablet Take 10 mg by mouth daily      Multiple Vitamins-Minerals (MULTIVITAMIN ADULT PO)       Probiotic Product (PROBIOTIC DAILY PO)       UNABLE TO FIND three times a week MEDICATION NAME: Wart peel, topical ointment. Pt applies ointment to ankle 3 times weekly.         Allergies   Allergen Reactions    Chlorpheniramine-Phenylephrine Palpitations    Seasonal Allergies Difficulty breathing    Apples [Apple Juice] GI Disturbance     Bloating, even with small amounts    Kiwi Rash     Perioral itching and redness        Social:   Works for [employer].  Her  is now working in [place] and traveling there frequently.    OBJECTIVE    Vitals: /75 (BP Location: Left arm, Patient Position: Sitting, Cuff Size: Adult Large)   Pulse 71   Temp 98.1  F (36.7  C) (Temporal)   Resp 17   Wt 73.1 kg (161 lb 4 oz)   SpO2 98%   BMI 26.83 kg/m    BMI= Body mass index is 26.83 kg/m .    She appears well and in no distress.  Head and neck exam is normal with no no lymphadenopathy.  Oropharynx is clear.    Lungs are clear to auscultation throughout    Cardiovascular-regular rate and rhythm without murmurs.    SEE TOP OF NOTE FOR ASSESSMENT AND PLAN    --Pablo  MD Roderick  [facility], Department of Family Medicine and Community Health

## 2024-05-01 NOTE — PATIENT INSTRUCTIONS
ASSESSMENT and PLAN:     Asthma  Suggested trying Breo inhaler. Will start on the low dose. May increase to higher dose as needed  Continue Albuterol but only as needed.   Anxiety, now under control. She has learned how to calm herself down with breathing exercises.   Hypertension, under control   GERD   Refilled Famotidine      Follow-up in about 3-6 months to assess A1C and follow up.     Pablo Vaughn MD  Family Medicine and Sports Medicine  [facility]

## 2024-05-01 NOTE — NURSING NOTE
58 year old  Chief Complaint   Patient presents with    Asthma     Wondering if related to current stress/anxiety. Hx of pollen allergies. Uses albuterol every morning prior to exercise.    Follow Up     Anxiety -- was having sweaty palms occ, waking at night without being able to go back to sleep.    Hypertension     Has bene monitoring at home past couple of days. 114/65 4/29, 127/70 this morning       Blood pressure 115/75, pulse 71, temperature 98.1  F (36.7  C), temperature source Temporal, resp. rate 17, weight 73.1 kg (161 lb 4 oz), SpO2 98%, not currently breastfeeding. Body mass index is 26.83 kg/m .  Patient Active Problem List   Diagnosis    Mild persistent asthma without complication    Carcinoid tumor (H28)    Gastroesophageal reflux disease, esophagitis presence not specified    Cat allergies    Surveillance of intrauterine contraceptive device    Screening for colon cancer    Umbilical hernia without obstruction and without gangrene    Essential hypertension    Elevated fasting glucose       Wt Readings from Last 2 Encounters:   05/01/24 73.1 kg (161 lb 4 oz)   01/16/24 74.9 kg (165 lb 1.3 oz)     BP Readings from Last 3 Encounters:   05/01/24 115/75   01/16/24 119/71   04/25/23 134/83         Current Outpatient Medications   Medication Sig Dispense Refill    albuterol (PROAIR HFA/PROVENTIL HFA/VENTOLIN HFA) 108 (90 Base) MCG/ACT inhaler Inhale 1-2 puffs into the lungs every 6 hours as needed for shortness of breath or wheezing 8.5 g 3    famotidine (PEPCID) 20 MG tablet TAKE 1 TABLET (20 MG) BY MOUTH 2 TIMES DAILY AS NEEDED (BLOATING OR REFLUX) 180 tablet 1    fluticasone (FLONASE) 50 MCG/ACT nasal spray Spray 2 sprays into both nostrils daily 9.9 mL 1    lisinopril-hydrochlorothiazide (ZESTORETIC) 20-25 MG tablet Take 1 tablet by mouth daily 90 tablet 3    loratadine (CLARITIN) 10 MG tablet Take 10 mg by mouth daily      Multiple Vitamins-Minerals (MULTIVITAMIN ADULT PO)       Probiotic Product  "(PROBIOTIC DAILY PO)       UNABLE TO FIND three times a week MEDICATION NAME: Wart peel, topical ointment. Pt applies ointment to ankle 3 times weekly.       No current facility-administered medications for this visit.       Social History     Tobacco Use    Smoking status: Never    Smokeless tobacco: Never   Substance Use Topics    Alcohol use: Yes     Alcohol/week: 2.0 standard drinks of alcohol     Comment: 1 glasses of wine per week    Drug use: No       Health Maintenance Due   Topic Date Due    ADVANCE CARE PLANNING  Never done    HEPATITIS B IMMUNIZATION (1 of 3 - 19+ 3-dose series) Never done    YEARLY PREVENTIVE VISIT  10/25/2022    COVID-19 Vaccine (7 - 2023-24 season) 11/26/2023       No results found for: \"PAP\"      May 1, 2024 9:21 AM    "

## 2024-07-25 ENCOUNTER — E-VISIT (OUTPATIENT)
Dept: URGENT CARE | Facility: CLINIC | Age: 59
End: 2024-07-25
Payer: COMMERCIAL

## 2024-07-25 DIAGNOSIS — R05.1 ACUTE COUGH: Primary | ICD-10-CM

## 2024-07-25 PROCEDURE — 99207 PR NON-BILLABLE SERV PER CHARTING: CPT | Performed by: PHYSICIAN ASSISTANT

## 2024-07-25 NOTE — PATIENT INSTRUCTIONS
Dear Susie Dupont,    We are sorry you are not feeling well. Based on the responses you provided, it is recommended that you be seen in-person in urgent care so we can better evaluate your symptoms. Please click here to find the nearest urgent care location to you.   You will not be charged for this Visit. Thank you for trusting us with your care.    Chiquita Dixon PA-C

## 2024-09-23 ENCOUNTER — MYC REFILL (OUTPATIENT)
Dept: FAMILY MEDICINE | Facility: CLINIC | Age: 59
End: 2024-09-23

## 2024-09-23 DIAGNOSIS — J45.30 MILD PERSISTENT ASTHMA WITHOUT COMPLICATION: Chronic | ICD-10-CM

## 2024-09-24 RX ORDER — FLUTICASONE FUROATE AND VILANTEROL 100; 25 UG/1; UG/1
1 POWDER RESPIRATORY (INHALATION) DAILY
Qty: 180 EACH | Refills: 0 | Status: SHIPPED | OUTPATIENT
Start: 2024-09-24

## 2024-09-24 NOTE — TELEPHONE ENCOUNTER
Medication requested: fluticasone-vilanterol (BREO ELLIPTA) 100-25 MCG/ACT inhaler   Last office visit: 5/1/24  [facility] appointments: none  Medication last refilled: 6/27/24; 180 + 0 refills  Last qualifying labs:    5/1/2024  9:10 AM   Asthma Control Test    ACT Total Score (Goal Greater than or Equal to 20) 17      Prescription approved per [facility] Refill Protocol.    Jg CORTES, RN  09/24/24 10:34 AM

## 2024-09-28 DIAGNOSIS — J45.30 MILD PERSISTENT ASTHMA WITHOUT COMPLICATION: Chronic | ICD-10-CM

## 2024-10-01 ENCOUNTER — MYC MEDICAL ADVICE (OUTPATIENT)
Dept: FAMILY MEDICINE | Facility: CLINIC | Age: 59
End: 2024-10-01

## 2024-10-01 RX ORDER — FLUTICASONE FUROATE AND VILANTEROL TRIFENATATE 100; 25 UG/1; UG/1
1 POWDER RESPIRATORY (INHALATION) DAILY
OUTPATIENT
Start: 2024-10-01

## 2024-10-01 NOTE — TELEPHONE ENCOUNTER
Sent [facility] message to pt to see if he requested inhaler in [place].    SEBASTIAN Del Rosario, RN  10/01/24, 9:43 AM

## 2024-11-11 ENCOUNTER — TELEPHONE (OUTPATIENT)
Dept: FAMILY MEDICINE | Facility: CLINIC | Age: 59
End: 2024-11-11

## 2024-11-11 DIAGNOSIS — M72.2 BILATERAL PLANTAR FASCIITIS: Primary | ICD-10-CM

## 2024-11-11 NOTE — TELEPHONE ENCOUNTER
Orders/Referrals (route to triage team)    Who is calling - Patient  Order/referral that is being requested - Podiatrist  For referrals only - specify the specialty if applicable and/or location being requested  Has the patient discussed this request with their provider? Does not know  Additional details/comments - Plantar Fasciitis in both feet.   Ok to leave a message on VM? Yes, prefers a [employer] message.

## 2024-11-11 NOTE — TELEPHONE ENCOUNTER
Referral placed, [employer] message sent to patient.  MITESH LeijaN, RN, CCM  RN Care Coordinator  [facility]  11/11/24  3:07 PM  Phone: [phone]

## 2024-11-12 ENCOUNTER — PATIENT OUTREACH (OUTPATIENT)
Dept: CARE COORDINATION | Facility: CLINIC | Age: 59
End: 2024-11-12
Payer: COMMERCIAL

## 2024-11-14 ENCOUNTER — PATIENT OUTREACH (OUTPATIENT)
Dept: CARE COORDINATION | Facility: CLINIC | Age: 59
End: 2024-11-14
Payer: COMMERCIAL

## 2024-11-20 ENCOUNTER — ANCILLARY PROCEDURE (OUTPATIENT)
Dept: GENERAL RADIOLOGY | Facility: CLINIC | Age: 59
End: 2024-11-20
Attending: PODIATRIST
Payer: COMMERCIAL

## 2024-11-20 ENCOUNTER — OFFICE VISIT (OUTPATIENT)
Dept: PODIATRY | Facility: CLINIC | Age: 59
End: 2024-11-20
Attending: FAMILY MEDICINE
Payer: COMMERCIAL

## 2024-11-20 VITALS
HEIGHT: 65 IN | WEIGHT: 165 LBS | BODY MASS INDEX: 27.49 KG/M2 | DIASTOLIC BLOOD PRESSURE: 64 MMHG | SYSTOLIC BLOOD PRESSURE: 132 MMHG

## 2024-11-20 DIAGNOSIS — M72.2 BILATERAL PLANTAR FASCIITIS: Primary | ICD-10-CM

## 2024-11-20 DIAGNOSIS — M72.2 PLANTAR FASCIITIS: ICD-10-CM

## 2024-11-20 PROCEDURE — 99203 OFFICE O/P NEW LOW 30 MIN: CPT | Performed by: PODIATRIST

## 2024-11-20 ASSESSMENT — PAIN SCALES - GENERAL: PAINLEVEL_OUTOF10: MODERATE PAIN (5)

## 2024-11-20 NOTE — LETTER
11/20/2024      Susie Dupont  [address]  [facility] [address]      Dear Colleague,    Thank you for referring your patient, Susie Dupont, to the [facility]. Please see a copy of my visit note below.    HPI:  Susie Dupont is a 59 year old female who is seen in consultation at the request of Pablo Vaughn MD    Pt presents for eval of:   (Onset, Location, L/R, Character, Treatments, Injury if yes)    XR Left and Right foot 11/20/2024     Onset July 2024, plantar, medial, lateral, posterior Right > Left heel pain. No injury noted.  Constant dull ache. Tightness with first steps after lying or sitting that improves after walking for a few minutes.    Onset Aug 2024, intermittent lateral Right 5th toe/bunion pain.     Intermittent left and right toe cramping.      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    ROS: 10 point ROS neg other than the symptoms noted above in the HPI.    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     Carcinoid tumor (H)     right lung, upper and middle lobe resected     Elevated hemoglobin A1c     8/6/19 A1c 6.0%     Fibroid (bleeding) (uterine)     chonic ongoing     Infectious viral hepatitis     Food Born Many Years Ago     Uncomplicated asthma      Viral hepatitis A without hepatic coma 1981     PAST SURGICAL HISTORY:   Past Surgical History:   Procedure Laterality Date     BIOPSY Bilateral     breast biopsies for  nipple discharge     BIOPSY BREAST Left 10 yrs ago    benign     BREAST CYST ASPIRATION Left     10 years ago, couple times      SECTION       DILATION AND CURETTAGE  10/2018    for menorrhagia related to fibroids. Helped     GYN SURGERY  1995     section     LUNG SURGERY Right     Carcinoid     NH HYSTEROSCOPY,W/ENDO BX N/A 10/30/2018    Procedure: HYSTEROSCOPY, WITH DILATION AND CURETTAGE, MYOMECTOMYINTRAUTERINE DEVICE REMOVAL;  Surgeon: Marine Woods MD;  Location: [facility];  Service: Gynecology     THORACIC SURGERY      Carcinoid tumor removed      WISDOM TOOTH EXTRACTION       MEDICATIONS:   Current Outpatient Medications:      albuterol (PROAIR HFA/PROVENTIL HFA/VENTOLIN HFA) 108 (90 Base) MCG/ACT inhaler, Inhale 1-2 puffs into the lungs every 6 hours as needed for shortness of breath or wheezing, Disp: 8.5 g, Rfl: 3     famotidine (PEPCID) 20 MG tablet, Take 1 tablet (20 mg) by mouth 2 times daily as needed (bloating or reflux), Disp: 180 tablet, Rfl: 1     fluticasone (FLONASE) 50 MCG/ACT nasal spray, Spray 2 sprays into both nostrils daily, Disp: 9.9 mL, Rfl: 1     fluticasone-vilanterol (BREO ELLIPTA) 100-25 MCG/ACT inhaler, Inhale 1 puff into the lungs daily., Disp: 180 each, Rfl: 0     lisinopril-hydrochlorothiazide (ZESTORETIC) 20-25 MG tablet, Take 1 tablet by mouth daily, Disp: 90 tablet, Rfl: 3     loratadine (CLARITIN) 10 MG tablet, Take 10 mg by mouth daily, Disp: , Rfl:      Multiple Vitamins-Minerals (MULTIVITAMIN ADULT PO), , Disp: , Rfl:      Probiotic Product (PROBIOTIC DAILY PO), , Disp: , Rfl:      UNABLE TO FIND, three times a week MEDICATION NAME: Wart peel, topical ointment. Pt applies ointment to ankle 3 times weekly., Disp: , Rfl:   ALLERGIES:    Allergies   Allergen Reactions     Chlorpheniramine-Phenylephrine Palpitations     Seasonal Allergies Difficulty breathing     Apples [Apple Juice] GI Disturbance     Bloating, even with small amounts      Kiwi Rash     Perioral itching and redness     SOCIAL HISTORY:   Social History     Socioeconomic History     Marital status:      Spouse name: Ernie Gee     Number of children: 2     Years of education: PhD     Highest education level: Not on file   Occupational History     Occupation: [employer]     Employer: [employer]     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    PhD in chemical engineering    Lives with     Has two daughters (one at college, one at home and is MS1 at [employer] medical school)    Splits time between [place] and [place]     Social Drivers of Health     Financial Resource Strain: Low Risk  (5/1/2024)    Financial Resource Strain      Within the past 12 months, have you or your family members you live with been unable to get utilities (heat, electricity) when it was really needed?: No   Food Insecurity: Low Risk  (5/1/2024)    Food Insecurity      Within the past 12 months, did you worry that your food would run out before you got money to buy more?: No      Within the past 12 months, did the food you bought just not last and you didn t have money to get more?: No   Transportation Needs: Low Risk  (5/1/2024)    Transportation Needs      Within the past 12 months, has lack of transportation kept you from medical appointments, getting your medicines, non-medical meetings or appointments, work, or from getting things that you need?: No   Physical Activity: Not on  "file   Stress: Not on file   Social Connections: Not on file   Interpersonal Safety: Low Risk  (2024)    Interpersonal Safety      Do you feel physically and emotionally safe where you currently live?: Yes      Within the past 12 months, have you been hit, slapped, kicked or otherwise physically hurt by someone?: No      Within the past 12 months, have you been humiliated or emotionally abused in other ways by your partner or ex-partner?: No   Housing Stability: Low Risk  (2024)    Housing Stability      Do you have housing? : Yes      Are you worried about losing your housing?: No     FAMILY HISTORY:   Family History   Problem Relation Age of Onset     Parkinsonism Mother      Coronary Artery Disease Mother 53     Hypertension Mother      Cancer Father 75        tongue cancer     Skin Cancer Father      Seasonal/Environmental Allergies Sister      Coronary Artery Disease Maternal Grandfather 55     Coronary Artery Disease Paternal Grandfather      Breast Cancer Paternal Aunt      Breast Cancer Paternal Aunt         50 something      Breast Cancer Cousin         late 40 s         Breast Cancer Paternal Cousin      Ovarian Cancer No family hx of      Colon Cancer No family hx of      Diabetes No family hx of        EXAM:Vitals: /64 (BP Location: Left arm, Patient Position: Sitting, Cuff Size: Adult Regular)   Ht 1.651 m (5' 5\")   Wt 74.8 kg (165 lb)   BMI 27.46 kg/m    BMI= Body mass index is 27.46 kg/m .    General appearance: Patient is alert and fully cooperative with history & exam.  No sign of distress is noted during the visit.     Psychiatric: Affect is pleasant & appropriate.  Patient appears motivated to improve health.     Respiratory: Breathing is regular & unlabored while sitting.     HEENT: Hearing is intact to spoken word.  Speech is clear.  No gross evidence of visual impairment that would impact ambulation.     Vascular: DP & PT 2/4 & regular bilaterally.  No significant " edema, rubor or varicosities noted.  CFT and skin temperature is normal to both lower extremities.       Neurologic: Lower extremity sensation is intact to light touch.  No evidence of weakness in the lower extremities.  No evidence of neuropathy and negative tinel sign.     Dermatologic: Skin is intact to both lower extremities without significant lesions, rash or abrasion.  Normal texture turgor and tone. No paronychia or evidence of soft tissue infection is noted.    Musculoskeletal: Patient is ambulatory without assistive device or brace. Pain is noted with firm palpation along the medial band of the plantar fascia bilateral foot most notably at the origination upon the calcaneus not through the arch.  No pain with compression of the calcaneus medial to lateral or with palpation of the achilles, peroneal or posterior tibial tendons.  Slightly more than 0  of ankle joint dorsiflexion without crepitus or pain throughout the ankle, subtalar or midtarsal joints.  No pain or limitations throughout manual muscle strength testing plus 5/5 to all four quadrants bilateral.  No palpable edema noted.      Radiographs:  Osteophyte noted about the plantar calcaneus consistent with plantar fasciitis. Diminished calcaneal inclination angle consistent with pes valgus.     ASSESSMENT:       ICD-10-CM    1. Bilateral plantar fasciitis  M72.2 Orthopedic  Referral     Orthotics, Mastectomy and Custom Compression Orders          PLAN:  Reviewed patient's chart in [facility] and discussed etiology and treatment options.      Treatments:  11/20/2024  Obtained and interpreted radiographs.   Discontinue barefoot walking or unsupported walking in shoes without shank.  Dispensed written instructions to obtain appropriate shoe gear and/or OTC inserts.  Dispensed anterior night splint to use all night every night.  Offered oral NSAID and patient preferred to no NSAID due to level of symptoms  Prescription for custom molded orthotics  11/20/2024 to reduce variables and how she supports her foot.  Follow up in 6 weeks with any continued questions or symptoms otherwise as needed  All questions were answered no work limitations.    Ti Brito DPM        Again, thank you for allowing me to participate in the care of your patient.        Sincerely,        Ti Brito DPM

## 2024-11-20 NOTE — PROGRESS NOTES
HPI:  Susie Dupont is a 59 year old female who is seen in consultation at the request of Pablo Vaughn MD    Pt presents for eval of:   (Onset, Location, L/R, Character, Treatments, Injury if yes)    XR Left and Right foot 2024     Onset 2024, plantar, medial, lateral, posterior Right > Left heel pain. No injury noted.  Constant dull ache. Tightness with first steps after lying or sitting that improves after walking for a few minutes.    Onset Aug 2024, intermittent lateral Right 5th toe/bunion pain.     Intermittent left and right toe cramping.      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    ROS: 10 point ROS neg other than the symptoms noted above in the HPI.    Patient Active Problem List   Diagnosis    Mild persistent asthma without complication    Carcinoid tumor (H)    Gastroesophageal reflux disease, esophagitis presence not specified    Cat allergies    Surveillance of intrauterine contraceptive device    Screening for colon cancer    Umbilical hernia without obstruction and without gangrene    Essential hypertension    Elevated fasting glucose       PAST MEDICAL HISTORY:   Past Medical History:   Diagnosis Date    Asthma     Breast mass     papillomatosis    Carcinoid tumor (H)     right lung, upper and middle lobe resected    Elevated hemoglobin A1c     19 A1c 6.0%    Fibroid (bleeding) (uterine)     chonic ongoing    Infectious viral hepatitis     Food Born Many Years Ago    Uncomplicated asthma     Viral hepatitis A without hepatic coma      PAST SURGICAL HISTORY:   Past Surgical History:   Procedure Laterality Date    BIOPSY Bilateral     breast biopsies for nipple discharge    BIOPSY BREAST Left 10 yrs ago    benign    BREAST CYST ASPIRATION Left     10 years ago, couple times     SECTION      DILATION AND CURETTAGE  10/2018    for menorrhagia related to fibroids. Helped    GYN SURGERY  1995      section    LUNG SURGERY Right 1997    Carcinoid    KY HYSTEROSCOPY,W/ENDO BX N/A 10/30/2018    Procedure: HYSTEROSCOPY, WITH DILATION AND CURETTAGE, MYOMECTOMYINTRAUTERINE DEVICE REMOVAL;  Surgeon: Marine Woods MD;  Location: [facility];  Service: Gynecology    THORACIC SURGERY      Carcinoid tumor removed     WISDOM TOOTH EXTRACTION       MEDICATIONS:   Current Outpatient Medications:     albuterol (PROAIR HFA/PROVENTIL HFA/VENTOLIN HFA) 108 (90 Base) MCG/ACT inhaler, Inhale 1-2 puffs into the lungs every 6 hours as needed for shortness of breath or wheezing, Disp: 8.5 g, Rfl: 3    famotidine (PEPCID) 20 MG tablet, Take 1 tablet (20 mg) by mouth 2 times daily as needed (bloating or reflux), Disp: 180 tablet, Rfl: 1    fluticasone (FLONASE) 50 MCG/ACT nasal spray, Spray 2 sprays into both nostrils daily, Disp: 9.9 mL, Rfl: 1    fluticasone-vilanterol (BREO ELLIPTA) 100-25 MCG/ACT inhaler, Inhale 1 puff into the lungs daily., Disp: 180 each, Rfl: 0    lisinopril-hydrochlorothiazide (ZESTORETIC) 20-25 MG tablet, Take 1 tablet by mouth daily, Disp: 90 tablet, Rfl: 3    loratadine (CLARITIN) 10 MG tablet, Take 10 mg by mouth daily, Disp: , Rfl:     Multiple Vitamins-Minerals (MULTIVITAMIN ADULT PO), , Disp: , Rfl:     Probiotic Product (PROBIOTIC DAILY PO), , Disp: , Rfl:     UNABLE TO FIND, three times a week MEDICATION NAME: Wart peel, topical ointment. Pt applies ointment to ankle 3 times weekly., Disp: , Rfl:   ALLERGIES:    Allergies   Allergen Reactions    Chlorpheniramine-Phenylephrine Palpitations    Seasonal Allergies Difficulty breathing    Apples [Apple Juice] GI Disturbance     Bloating, even with small amounts    Kiwi Rash     Perioral itching and redness     SOCIAL HISTORY:   Social History     Socioeconomic History    Marital status:      Spouse name: Ernie Gee    Number of children: 2    Years of education: PhD    Highest education level: Not on file   Occupational  History    Occupation: [employer]     Employer: [employer]     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    Lives with     Has two daughters (one at college, one at home and is MS1 at [employer] school)    Splits time between [place] and [place]     Social Drivers of Health     Financial Resource Strain: Low Risk  (5/1/2024)    Financial Resource Strain     Within the past 12 months, have you or your family members you live with been unable to get utilities (heat, electricity) when it was really needed?: No   Food Insecurity: Low Risk  (5/1/2024)    Food Insecurity     Within the past 12 months, did you worry that your food would run out before you got money to buy more?: No     Within the past 12 months, did the food you bought just not last and you didn t have money to get more?: No   Transportation Needs: Low Risk  (5/1/2024)    Transportation Needs     Within the past 12 months, has lack of transportation kept you from medical appointments, getting your medicines, non-medical meetings or appointments, work, or from getting things that you need?: No   Physical Activity: Not on file   Stress: Not on file   Social Connections: Not on file   Interpersonal Safety: Low Risk  (5/1/2024)    Interpersonal Safety     Do you feel physically and emotionally safe where you currently live?: Yes     Within the past 12 months, have you been hit, slapped, kicked or otherwise  "physically hurt by someone?: No     Within the past 12 months, have you been humiliated or emotionally abused in other ways by your partner or ex-partner?: No   Housing Stability: Low Risk  (2024)    Housing Stability     Do you have housing? : Yes     Are you worried about losing your housing?: No     FAMILY HISTORY:   Family History   Problem Relation Age of Onset    Parkinsonism Mother     Coronary Artery Disease Mother 53    Hypertension Mother     Cancer Father 75        tongue cancer    Skin Cancer Father     Seasonal/Environmental Allergies Sister     Coronary Artery Disease Maternal Grandfather 55    Coronary Artery Disease Paternal Grandfather     Breast Cancer Paternal Aunt     Breast Cancer Paternal Aunt         50 something     Breast Cancer Cousin         late 40 s        Breast Cancer Paternal Cousin     Ovarian Cancer No family hx of     Colon Cancer No family hx of     Diabetes No family hx of        EXAM:Vitals: /64 (BP Location: Left arm, Patient Position: Sitting, Cuff Size: Adult Regular)   Ht 1.651 m (5' 5\")   Wt 74.8 kg (165 lb)   BMI 27.46 kg/m    BMI= Body mass index is 27.46 kg/m .    General appearance: Patient is alert and fully cooperative with history & exam.  No sign of distress is noted during the visit.     Psychiatric: Affect is pleasant & appropriate.  Patient appears motivated to improve health.     Respiratory: Breathing is regular & unlabored while sitting.     HEENT: Hearing is intact to spoken word.  Speech is clear.  No gross evidence of visual impairment that would impact ambulation.     Vascular: DP & PT 2/4 & regular bilaterally.  No significant edema, rubor or varicosities noted.  CFT and skin temperature is normal to both lower extremities.       Neurologic: Lower extremity sensation is intact to light touch.  No evidence of weakness in the lower extremities.  No evidence of neuropathy and negative tinel sign.     Dermatologic: Skin is intact to both " lower extremities without significant lesions, rash or abrasion.  Normal texture turgor and tone. No paronychia or evidence of soft tissue infection is noted.    Musculoskeletal: Patient is ambulatory without assistive device or brace. Pain is noted with firm palpation along the medial band of the plantar fascia bilateral foot most notably at the origination upon the calcaneus not through the arch.  No pain with compression of the calcaneus medial to lateral or with palpation of the achilles, peroneal or posterior tibial tendons.  Slightly more than 0  of ankle joint dorsiflexion without crepitus or pain throughout the ankle, subtalar or midtarsal joints.  No pain or limitations throughout manual muscle strength testing plus 5/5 to all four quadrants bilateral.  No palpable edema noted.      Radiographs:  Osteophyte noted about the plantar calcaneus consistent with plantar fasciitis. Diminished calcaneal inclination angle consistent with pes valgus.     ASSESSMENT:       ICD-10-CM    1. Bilateral plantar fasciitis  M72.2 Orthopedic  Referral     Orthotics, Mastectomy and Custom Compression Orders          PLAN:  Reviewed patient's chart in [facility] and discussed etiology and treatment options.      Treatments:  11/20/2024  Obtained and interpreted radiographs.   Discontinue barefoot walking or unsupported walking in shoes without shank.  Dispensed written instructions to obtain appropriate shoe gear and/or OTC inserts.  Dispensed anterior night splint to use all night every night.  Offered oral NSAID and patient preferred to no NSAID due to level of symptoms  Prescription for custom molded orthotics 11/20/2024 to reduce variables and how she supports her foot.  Follow up in 6 weeks with any continued questions or symptoms otherwise as needed  All questions were answered no work limitations.    Ti Brito DPM

## 2024-11-20 NOTE — PATIENT INSTRUCTIONS
PLANTAR FASCIITIS  The  plantar fascia  is a thick fibrous layer of tissue that covers the bones on the bottom of your foot. It supports the foot bones in an arched position.  Plantar fasciitis  is a painful inflammation of the plantar fascia due to overuse. This can develop gradually or suddenly. It usually affects one foot at a time but can affect both feet. Heel pain can be sharp and feel like a knife sticking in the bottom of your foot. Pain may occur after exercising, long distance jogging, stair climbing, long periods of standing, or after getting up from a seated position.  Risk factors include arthritis, diabetes, obesity or recent weight gain, flat-foot, high arch, wearing high heels or loose shoes or shoes with poor arch support.  Sudden changes in activity or shoe gear may contribute to symptoms.  Foot pain from this condition is usually worse in the morning and improves with walking. By the end of the day there may be a dull aching. Treatment requires improved support of feet, short-term rest and controlling inflammation. It may take up to nine months before all symptoms go away with the measures described below.  A steroid injection into the foot, or surgery may be needed if this is becomes long standing or severe.  HOME CARE  If you are overweight, lose weight to promote healing.  Choose supportive shoes (stiff through the shank) with good arch support and shock absorbency. Replace athletic shoes when they become worn out. Don t walk or run barefoot.  Shoe inserts are an important part of treatment. You can buy off-the-shelf shoe inserts inexpensively such as [employer].  The best ones are custom molded to your foot with a prescription.  Night splints keep the plantar fascia gently stretched while you sleep and will eliminate morning pain. Wear it ALL NIGHT EVERY NIGHT, or any time you sit for a long time.  Reduce by 10% or more the activities that stress the feet: jogging, prolonged standing or  walking, high impact sports, etc.  Stretch your feet. Gently flex your ankle by leaning into a wall or counter or drop your heel from a step.  Stretch two minutes of every hour you are awake.  Icing or massage may help heel pain. Apply an ice pack or frozen water or Coke bottle to the heel for 10-20 minutes as a preventive or after an acute flare of symptoms. You may repeat this as needed.   Follow up with your Doctor in 3 weeks as instructed.    Reliable shoe stores: To maximize your experience and provide the best possible fit.  Be sure to show them your foot concerns and tell them Dr. Brito sent you.      Stores listed in bold have only athletic shoes, and stores that are not bold are mostly casual or variety of shoes    [employer]  [address] [place] [address]  [phone]     [employer] [facility]  [address] [place] [address]  [phone]     [employer] [facility]  [address] [place] [address]  [phone]    [employer]  [address] [facility] [address]  [phone]    [employer]  [address] [place], [place] [address] [phone]    [employer]  [address] [place] [address]  [phone]                         [employer] Locations:     [address] [place] [address]   [phone]     [address] [place] [address] [address] [place] [address]   [phone]     [address] [place] [address]   [phone]     [address] [facility] [facility] [place] [address]   [phone]     [address] [place] [address]   [phone]     [address] [place] [address]   [phone]     [address] [facility] [address]   [place] [address]   [phone]     [address]   [place], [place] [id number] [phone]             How to find reasonable shoes          The correct width    Correct Fitting    Correct Length      Foot Distortion    Posture Distortion                           Torsional Rigidity      Grasp behind the heel and underneath the foot and twist      Bad    Excessive torsion/twist in midfoot     Less torsion/twist in midfoot is better                   Heel Counter Rigidity      Grasp just above   midsole and squeeze      Bad    Soft heel counter      Good    Rigid Heel Counter      Flexion Rigidity      Grasp shoe and bend from forefoot to rearfoot              Calluses, Corns, IPKs, Porokeratosis    When there is excessive friction or pressure on the skin, the body responds by making the skin thicker.  While this may protect the deeper structures, the thickened skin can take up more space and thus increase pressure over a bony prominence or become an open sore or skin ulcer as this skin becomes less flexible.    Flat, diffuse thickening are simple calluses and they are usually caused by friction.  Often these are the result of rubbing on a shoe or going barefoot.    Calluses with a central core between the toes are called corns.  These result from prominent joints on adjacent toes rubbing together.  Theses are a symptom of bone malalignment and will always recur unless the underlying bones are addressed surgically.    Most of these lesions can be kept comfortable with routine maintenance. This consists of filing them with a Ped Egg, callus file, or 120 grit sandpaper on a block, every day during your bath or shower.  Most people prefer battery operated fernando such as an Amope', Personal Pedi and Emjoi for regular use or heavy painful callouses.  Heavy creams or ointments can be applied 1-2 times every day to keep them soft. Toe spacers can be used for corns, gel pads can be used for other lesions on the bottom of the foot. If there is a deformity noted, such as a prominent bone, often this can be addressed to minimize recurrence. However, sometimes the pressure and lesion simply migrates to another spot after surgery, so it is not a guaranteed cure.     www.pedifix.com is  the best source for all sorts of foot pads and cushions    If you have severe callouses and cracking, you may apply heavy greasy cream or ointments that you scoop up such as Cetaphil cream, Eucerin, Aquaphor or Vaseline.  Be sure to obtain cream or ointment in these brands and not lotion (lotion is water based and not durable enough for feet). For more aggressive help apply heavy creams or ointment under occlusive dressings such as Saran Wrap or Jelly Feet while sleeping.   Jelly Feet can be obtained at www.jellyfeet.com.     To be successful with managing hyperkeratotic skin, you must manage hygiene daily.  At your bath or shower time is the easiest time to work on this.  There is no medical or surgical treatment that will absolutely eliminate many of these and symptoms.    Please call with any additional questions.   ;

## 2024-11-21 NOTE — NURSING NOTE
Dispensed 2 Dorsal (Anterior) Night Splint, Size S/M, with [facility] agreement signed by patient. Neelima Arauz CMA, November 20, 2024

## 2024-12-08 DIAGNOSIS — J45.30 MILD PERSISTENT ASTHMA WITHOUT COMPLICATION: Chronic | ICD-10-CM

## 2024-12-09 ENCOUNTER — MYC REFILL (OUTPATIENT)
Dept: FAMILY MEDICINE | Facility: CLINIC | Age: 59
End: 2024-12-09

## 2024-12-09 DIAGNOSIS — J45.30 MILD PERSISTENT ASTHMA WITHOUT COMPLICATION: Chronic | ICD-10-CM

## 2024-12-09 RX ORDER — FLUTICASONE FUROATE AND VILANTEROL TRIFENATATE 100; 25 UG/1; UG/1
1 POWDER RESPIRATORY (INHALATION) DAILY
Qty: 180 EACH | Refills: 0 | Status: SHIPPED | OUTPATIENT
Start: 2024-12-09 | End: 2024-12-09

## 2024-12-09 NOTE — TELEPHONE ENCOUNTER
Medication requested: fluticasone-vilanterol (BREO ELLIPTA) 100-25 MCG/ACT inhaler   Last office visit: 5/1/2024  [facility] appointments: 12/16/2024  Medication last refilled: 9/24/2024; 180 ea + 0 refills  Last qualifying labs:      5/1/2024  9:10 AM   ACT and ATAQ Scores    ACT TOTAL SCORE (Goal Greater than or Equal to 20) 17      Prescription approved per [facility] Refill Protocol.    SEBASTINA Del Rosario, RN  12/09/24, 4:00 PM

## 2024-12-10 DIAGNOSIS — Z13.220 LIPID SCREENING: ICD-10-CM

## 2024-12-10 DIAGNOSIS — Z13.228 SCREENING FOR METABOLIC DISORDER: ICD-10-CM

## 2024-12-10 DIAGNOSIS — R73.01 ELEVATED FASTING GLUCOSE: Primary | ICD-10-CM

## 2024-12-11 ENCOUNTER — LAB (OUTPATIENT)
Dept: LAB | Facility: CLINIC | Age: 59
End: 2024-12-11
Payer: COMMERCIAL

## 2024-12-11 DIAGNOSIS — Z13.228 SCREENING FOR METABOLIC DISORDER: ICD-10-CM

## 2024-12-11 DIAGNOSIS — Z13.220 LIPID SCREENING: ICD-10-CM

## 2024-12-11 DIAGNOSIS — R73.01 ELEVATED FASTING GLUCOSE: ICD-10-CM

## 2024-12-11 LAB
ALBUMIN SERPL BCG-MCNC: 4.4 G/DL (ref 3.5–5.2)
ALP SERPL-CCNC: 81 U/L (ref 40–150)
ALT SERPL W P-5'-P-CCNC: 21 U/L (ref 0–50)
ANION GAP SERPL CALCULATED.3IONS-SCNC: 12 MMOL/L (ref 7–15)
AST SERPL W P-5'-P-CCNC: 26 U/L (ref 0–45)
BILIRUB SERPL-MCNC: 0.5 MG/DL
BUN SERPL-MCNC: 15.2 MG/DL (ref 6–20)
CALCIUM SERPL-MCNC: 9.6 MG/DL (ref 8.8–10.4)
CHLORIDE SERPL-SCNC: 102 MMOL/L (ref 98–107)
CHOLEST SERPL-MCNC: 271 MG/DL
CREAT SERPL-MCNC: 0.87 MG/DL (ref 0.51–0.95)
EGFRCR SERPLBLD CKD-EPI 2021: 77 ML/MIN/1.73M2
EST. AVERAGE GLUCOSE BLD GHB EST-MCNC: 128 MG/DL
FASTING STATUS PATIENT QL REPORTED: YES
FASTING STATUS PATIENT QL REPORTED: YES
GLUCOSE SERPL-MCNC: 120 MG/DL (ref 70–99)
HBA1C MFR BLD: 6.1 % (ref 0–5.6)
HCO3 SERPL-SCNC: 25 MMOL/L (ref 22–29)
HDLC SERPL-MCNC: 60 MG/DL
LDLC SERPL CALC-MCNC: 177 MG/DL
NONHDLC SERPL-MCNC: 211 MG/DL
POTASSIUM SERPL-SCNC: 4.1 MMOL/L (ref 3.4–5.3)
PROT SERPL-MCNC: 7.6 G/DL (ref 6.4–8.3)
SODIUM SERPL-SCNC: 139 MMOL/L (ref 135–145)
TRIGL SERPL-MCNC: 168 MG/DL

## 2024-12-11 PROCEDURE — 84450 TRANSFERASE (AST) (SGOT): CPT | Mod: ORL | Performed by: FAMILY MEDICINE

## 2024-12-11 PROCEDURE — 83718 ASSAY OF LIPOPROTEIN: CPT | Mod: ORL | Performed by: FAMILY MEDICINE

## 2024-12-11 PROCEDURE — 84132 ASSAY OF SERUM POTASSIUM: CPT | Mod: ORL | Performed by: FAMILY MEDICINE

## 2024-12-11 PROCEDURE — 84460 ALANINE AMINO (ALT) (SGPT): CPT | Mod: ORL | Performed by: FAMILY MEDICINE

## 2024-12-11 PROCEDURE — 82947 ASSAY GLUCOSE BLOOD QUANT: CPT | Mod: ORL | Performed by: FAMILY MEDICINE

## 2024-12-11 RX ORDER — FLUTICASONE FUROATE AND VILANTEROL 100; 25 UG/1; UG/1
1 POWDER RESPIRATORY (INHALATION) DAILY
Qty: 180 EACH | Refills: 1 | Status: SHIPPED | OUTPATIENT
Start: 2024-12-11

## 2024-12-12 ENCOUNTER — OFFICE VISIT (OUTPATIENT)
Dept: FAMILY MEDICINE | Facility: CLINIC | Age: 59
End: 2024-12-12
Payer: COMMERCIAL

## 2024-12-12 DIAGNOSIS — E78.5 HYPERLIPIDEMIA LDL GOAL <100: ICD-10-CM

## 2024-12-12 DIAGNOSIS — Z71.3 DIETARY COUNSELING AND SURVEILLANCE: Primary | ICD-10-CM

## 2024-12-12 NOTE — PATIENT INSTRUCTIONS
Substitute olive oil or avocado oil for butter when heating wraps  Decrease/eliminate croissants  Decrease to 2% milk in coffee, or alternatively reduce portion to 1/4 cup  Decrease/eliminate ice cream  Reduce portions of rice, focus on adding non starchy vegetables on the side  Adjust date or berries at breakfast to decrease carbs slightly  When traveling, bring own oat mixes if possible; focus on salads for at least one meal  Recheck labs in 3 months (or per Dr. Vaughn)       Dietary changes to lower cholesterol  Reduce intake of the following sources of cholesterol and saturated fat  - Red meat and fatty meat that isn't trimmed  - Full fat dairy products such as whole milk, cream, ice cream, butter and cheese  - Baked goods made with saturated or trans fats such as donuts, cakes and cookies  - Saturated oils such as coconut, palm oil and palm kernel oil  - Solid fats such as shortening, margarine    Increase intake of whole grains and soluble fiber  - A variety of fruit and vegetables, aim for 5 servings or more per day  - Include a variety of whole grains such as whole grain bread, cereal, oatmeal, barley and brown rice  - Include low fat dairy products  - Consume poultry without skin  - Include fatty fish such as salmon, albacore tuna and sardines  - Unsalted nuts, seeds (anita seeds, flax seeds) and legumes are also great additions    Follow up:  In 3 months, following next lab check    Jennie Bucio RD

## 2024-12-12 NOTE — PROGRESS NOTES
Nutrition Reassessment    Previous goal(s):  ***    Susie is a 59 year old female who presents for ***.  Last seen in 2022  Suspects weight trending up due to sedentary nature of job and more wine while in [place].     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 In [place] for much of the time as  working in [place], traveling a lot (up to twice per month). A little more sedentary.   Physical activity: Exercise daily, alternate spin bike and elliptical; moderate intensity 30 minutes, 10-15 some weights and stretching. Lots of walking up and down hills. For the past 3-4 months has struggled with plantar fasciitis. Started with a  3 weeks ago.   Medications: pepcid  Vitamins/Supplements: multivitamin women's over 50, probiotic, calcium (haven't started yet)    Recent weights:   Wt Readings from Last 6 Encounters:   11/20/24 74.8 kg (165 lb)   05/01/24 73.1 kg (161 lb 4 oz)   01/16/24 74.9 kg (165 lb 1.3 oz)   04/25/23 73 kg (161 lb)   01/06/23 73.5 kg (162 lb 0.6 oz)   08/11/22 69 kg (152 lb 1.3 oz)     Lab Results   Component Value Date    A1C 6.1 12/11/2024    A1C 5.8 01/19/2024    A1C 5.7 10/23/2020    A1C 6.0 08/06/2019     Recent Labs   Lab Test 12/11/24  0905 01/16/24  0953 02/24/22  1506 10/23/20  0926   CHOL 271* 222*   < > 267.0*   HDL 60 52   < > 66.0   * 145*   < > 164.0*   TRIG 168* 126   < > 185.0*   CHOLHDLRATIO  --   --   --  4.0    < > = values in this interval not  displayed.        Intervention(s):  Substitute olive oil or avocado oil for butter when heating wraps  Decrease/eliminate croissants  Decrease to 2% milk in coffee, or alternatively reduce portion to 1/4 cup  Decrease/eliminate ice cream  Reduce portions of rice, focus on adding non starchy vegetables on the side  Adjust date or berries at breakfast to decrease carbs slightly  When traveling, bring own oat mixes if possible; focus on salads for at least one meal  Recheck labs in 3 months (or per Dr. Vaughn)       Dietary changes to lower cholesterol  Reduce intake of the following sources of cholesterol and saturated fat  - Red meat and fatty meat that isn't trimmed  - Full fat dairy products such as whole milk, cream, ice cream, butter and cheese  - Baked goods made with saturated or trans fats such as donuts, cakes and cookies  - Saturated oils such as coconut, palm oil and palm kernel oil  - Solid fats such as shortening, margarine    Increase intake of whole grains and soluble fiber  - A variety of fruit and vegetables, aim for 5 servings or more per day  - Include a variety of whole grains such as whole grain bread, cereal, oatmeal, barley and brown rice  - Include low fat dairy products  - Consume poultry without skin  - Include fatty fish such as salmon, albacore tuna and sardines  - Unsalted nuts, seeds (anita seeds, flax seeds) and legumes are also great additions    Follow up:  In 3 months, following next lab check  Will connect with Susie about calcium supplements and cholesterol/heart disease    Patient referred by Pablo Vaughn MD   Total time spent with patient *** minutes    Jennie Bucio RD   seeds (anita seeds, flax seeds) and legumes are also great additions    Follow up:  In 3 months, following next lab check  Will connect with Susie about calcium supplements and cholesterol/heart disease    Patient referred by Pablo Vaughn MD   Total time spent with patient 55 minutes    Jennie Bucio RD

## 2024-12-16 ENCOUNTER — OFFICE VISIT (OUTPATIENT)
Dept: FAMILY MEDICINE | Facility: CLINIC | Age: 59
End: 2024-12-16
Payer: COMMERCIAL

## 2024-12-16 VITALS
TEMPERATURE: 98.9 F | HEART RATE: 84 BPM | BODY MASS INDEX: 26.75 KG/M2 | WEIGHT: 160.75 LBS | OXYGEN SATURATION: 96 % | SYSTOLIC BLOOD PRESSURE: 118 MMHG | DIASTOLIC BLOOD PRESSURE: 67 MMHG | RESPIRATION RATE: 14 BRPM

## 2024-12-16 DIAGNOSIS — Z91.89 AT RISK FOR DIABETES MELLITUS: ICD-10-CM

## 2024-12-16 DIAGNOSIS — I10 ESSENTIAL HYPERTENSION: Primary | Chronic | ICD-10-CM

## 2024-12-16 DIAGNOSIS — E78.5 HYPERLIPIDEMIA LDL GOAL <100: ICD-10-CM

## 2024-12-16 NOTE — NURSING NOTE
59 year old  Chief Complaint   Patient presents with    Follow Up    Hypertension     Home bp readings are looking good -- 120-130s, occ 140 systolic. 70-75's diastolic. Does not sometimes if she stands too quickly will get faint. Question re: does exercise affect BP? Normally waits a couple of hours after exercising on elliptical machine prior to obtaining readings.    Diabetes     6.1 A1c from 12/12/24       Blood pressure 118/67, pulse 84, temperature 98.9  F (37.2  C), temperature source Temporal, resp. rate 14, weight 72.9 kg (160 lb 12 oz), SpO2 96%, not currently breastfeeding. Body mass index is 26.75 kg/m .  Patient Active Problem List   Diagnosis    Mild persistent asthma without complication    Carcinoid tumor (H)    Gastroesophageal reflux disease, esophagitis presence not specified    Cat allergies    Surveillance of intrauterine contraceptive device    Screening for colon cancer    Umbilical hernia without obstruction and without gangrene    Essential hypertension    Elevated fasting glucose       Wt Readings from Last 2 Encounters:   12/16/24 72.9 kg (160 lb 12 oz)   11/20/24 74.8 kg (165 lb)     BP Readings from Last 3 Encounters:   12/16/24 118/67   11/20/24 132/64   05/01/24 115/75         Current Outpatient Medications   Medication Sig Dispense Refill    albuterol (PROAIR HFA/PROVENTIL HFA/VENTOLIN HFA) 108 (90 Base) MCG/ACT inhaler Inhale 1-2 puffs into the lungs every 6 hours as needed for shortness of breath or wheezing 8.5 g 3    famotidine (PEPCID) 20 MG tablet Take 1 tablet (20 mg) by mouth 2 times daily as needed (bloating or reflux) 180 tablet 1    fluticasone (FLONASE) 50 MCG/ACT nasal spray Spray 2 sprays into both nostrils daily 9.9 mL 1    fluticasone-vilanterol (BREO ELLIPTA) 100-25 MCG/ACT inhaler Inhale 1 puff into the lungs daily. 180 each 1    lisinopril-hydrochlorothiazide (ZESTORETIC) 20-25 MG tablet Take 1 tablet by mouth daily 90 tablet 3    loratadine (CLARITIN) 10 MG tablet  "Take 10 mg by mouth daily      Multiple Vitamins-Minerals (MULTIVITAMIN ADULT PO)       Probiotic Product (PROBIOTIC DAILY PO)       UNABLE TO FIND three times a week MEDICATION NAME: Wart peel, topical ointment. Pt applies ointment to ankle 3 times weekly.       No current facility-administered medications for this visit.       Social History     Tobacco Use    Smoking status: Never    Smokeless tobacco: Never   Substance Use Topics    Alcohol use: Yes     Alcohol/week: 2.0 standard drinks of alcohol     Comment: 1 glasses of wine per week    Drug use: No       Health Maintenance Due   Topic Date Due    ADVANCE CARE PLANNING  Never done    HEPATITIS B IMMUNIZATION (1 of 3 - 19+ 3-dose series) Never done    INFLUENZA VACCINE (1) 09/01/2024    COVID-19 Vaccine (7 - 2024-25 season) 09/01/2024    ASTHMA CONTROL TEST  11/01/2024    ASTHMA ACTION PLAN  01/16/2025       No results found for: \"PAP\"      December 16, 2024 8:14 AM    "

## 2024-12-16 NOTE — PATIENT INSTRUCTIONS
ASSESSMENT and PLAN:     Hypertension on Zestoretic with BPs in 120-130s/70s.   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)  She opted to work hard on diet and exercise. Look into https://[email or web address]/heart-health/11-foods-that-lower-cholesterol  May opt for a CAC CT test at some point  Asthma has been well controlled with the Breo      Follow-up in about 3-6 months.     Pablo Vaughn MD  Family Medicine and Sports Medicine  [facility]

## 2024-12-16 NOTE — PROGRESS NOTES
ASSESSMENT and PLAN:     Hypertension on Zestoretic with BPs in 120-130s/70s.   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 Look into https://[email or web address]/heart-health/11-foods-that-lower-cholesterol  May opt for a CAC CT test at some point  Asthma has been well controlled with the Breo      Follow-up in about 3-6 months.     Pablo Vaughn MD  Family Medicine and Sports Medicine  [facility]      Medical assistant intake:  Susie Dupont is a 59 year old female who presents to [facility] today for Follow Up, Hypertension (Home bp readings are looking good -- 120-130s, occ 140 systolic. 70-75's diastolic. Does not sometimes if she stands too quickly will get faint. Question re: does exercise affect BP? Normally waits a couple of hours after exercising on elliptical machine prior to obtaining readings.), and Diabetes (6.1 A1c from 12/12/24)      SUBJECTIVE:   This is my 2nd time meeting Susie. She previously saw Dr. Dong at our clinic.  She has known hypertension.  Also has had A1c levels that show her at risk for future diabetes.  In addition has had borderline cholesterol and we have just rechecked labs.  Finally, has asthma and that has been well-controlled with Breo.    Overall she has been feeling well.  She has no   Concerns today other than her labs    Review Of Systems:    Has otherwise been in usual state of health, e.g.   Cardiovascular: negative  Respiratory: No shortness of breath, dyspnea on exertion, cough, or hemoptysis  Gastrointestinal: negative  Genitourinary: negative    Problem list per EMR:  Patient Active Problem List   Diagnosis    Mild persistent asthma without complication     Carcinoid tumor (H)    Gastroesophageal reflux disease, esophagitis presence not specified    Cat allergies    Surveillance of intrauterine contraceptive device    Screening for colon cancer    Umbilical hernia without obstruction and without gangrene    Essential hypertension    Elevated fasting glucose       Current Outpatient Medications   Medication Sig Dispense Refill    albuterol (PROAIR HFA/PROVENTIL HFA/VENTOLIN HFA) 108 (90 Base) MCG/ACT inhaler Inhale 1-2 puffs into the lungs every 6 hours as needed for shortness of breath or wheezing 8.5 g 3    famotidine (PEPCID) 20 MG tablet Take 1 tablet (20 mg) by mouth 2 times daily as needed (bloating or reflux) 180 tablet 1    fluticasone (FLONASE) 50 MCG/ACT nasal spray Spray 2 sprays into both nostrils daily 9.9 mL 1    fluticasone-vilanterol (BREO ELLIPTA) 100-25 MCG/ACT inhaler Inhale 1 puff into the lungs daily. 180 each 1    lisinopril-hydrochlorothiazide (ZESTORETIC) 20-25 MG tablet Take 1 tablet by mouth daily 90 tablet 3    loratadine (CLARITIN) 10 MG tablet Take 10 mg by mouth daily      Multiple Vitamins-Minerals (MULTIVITAMIN ADULT PO)       Probiotic Product (PROBIOTIC DAILY PO)       UNABLE TO FIND three times a week MEDICATION NAME: Wart peel, topical ointment. Pt applies ointment to ankle 3 times weekly.         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   appears well and in no distress.  A1c level  at 6.1%   cholesterol levels noted with elevated LDL at 170.    The 10-year ASCVD risk score (Ashley LR, et al., 2019) is: 3.8%    Values used to calculate the score:      Age: 59 years      Sex: Female      Is Non- : No      Diabetic: No      Tobacco smoker: No      Systolic Blood Pressure: 118 mmHg      Is BP treated: Yes      HDL Cholesterol: 60 mg/dL      Total Cholesterol: 271 mg/dL      SEE TOP OF NOTE FOR ASSESSMENT AND PLAN    --Pablo Vaughn MD  [facility], Department of Family Medicine and Community Health

## 2024-12-17 ENCOUNTER — PATIENT OUTREACH (OUTPATIENT)
Dept: CARE COORDINATION | Facility: CLINIC | Age: 59
End: 2024-12-17
Payer: COMMERCIAL

## 2024-12-19 ENCOUNTER — PATIENT OUTREACH (OUTPATIENT)
Dept: CARE COORDINATION | Facility: CLINIC | Age: 59
End: 2024-12-19
Payer: COMMERCIAL

## 2025-02-08 ENCOUNTER — MYC MEDICAL ADVICE (OUTPATIENT)
Dept: FAMILY MEDICINE | Facility: CLINIC | Age: 60
End: 2025-02-08

## 2025-02-08 DIAGNOSIS — I10 ESSENTIAL HYPERTENSION: Chronic | ICD-10-CM

## 2025-02-10 RX ORDER — LISINOPRIL AND HYDROCHLOROTHIAZIDE 20; 25 MG/1; MG/1
1 TABLET ORAL DAILY
Qty: 90 TABLET | Refills: 1 | Status: SHIPPED | OUTPATIENT
Start: 2025-02-10

## 2025-02-10 NOTE — TELEPHONE ENCOUNTER
Medication requested: lisinopril-hydrochlorothiazide (ZESTORETIC) 20-25 MG tablet   Last office visit: 12/16/2024  [facility] appointments: none  Medication last refilled: 1/16/2024; 90 tabs + 3 refills  Last qualifying labs:     BP Readings from Last 3 Encounters:   12/16/24 118/67   11/20/24 132/64   05/01/24 115/75     Component      Latest Ref Rng 12/11/2024  9:05 AM   GFR Estimate      >60 mL/min/1.73m2 77      Component      Latest Ref Rng 12/11/2024  9:05 AM   Potassium      3.4 - 5.3 mmol/L 4.1      Prescription approved per [facility] Refill Protocol.    SEBASTIAN Del Rosario, RN  02/10/25, 9:02 AM

## 2025-03-06 ENCOUNTER — ANCILLARY PROCEDURE (OUTPATIENT)
Dept: MAMMOGRAPHY | Facility: CLINIC | Age: 60
End: 2025-03-06
Attending: OBSTETRICS & GYNECOLOGY
Payer: COMMERCIAL

## 2025-03-06 DIAGNOSIS — Z12.31 ENCOUNTER FOR SCREENING MAMMOGRAM FOR MALIGNANT NEOPLASM OF BREAST: ICD-10-CM

## 2025-04-14 ENCOUNTER — MYC MEDICAL ADVICE (OUTPATIENT)
Dept: FAMILY MEDICINE | Facility: CLINIC | Age: 60
End: 2025-04-14

## 2025-04-22 ENCOUNTER — OFFICE VISIT (OUTPATIENT)
Dept: FAMILY MEDICINE | Facility: CLINIC | Age: 60
End: 2025-04-22
Payer: COMMERCIAL

## 2025-04-22 VITALS
TEMPERATURE: 98.7 F | DIASTOLIC BLOOD PRESSURE: 74 MMHG | BODY MASS INDEX: 27.49 KG/M2 | SYSTOLIC BLOOD PRESSURE: 118 MMHG | HEART RATE: 89 BPM | OXYGEN SATURATION: 97 % | HEIGHT: 64 IN | WEIGHT: 161 LBS

## 2025-04-22 DIAGNOSIS — Z13.228 SCREENING FOR METABOLIC DISORDER: ICD-10-CM

## 2025-04-22 DIAGNOSIS — E78.5 HYPERLIPIDEMIA LDL GOAL <100: ICD-10-CM

## 2025-04-22 DIAGNOSIS — Z00.00 ANNUAL PHYSICAL EXAM: Primary | ICD-10-CM

## 2025-04-22 DIAGNOSIS — Z91.89 AT RISK FOR DIABETES MELLITUS: ICD-10-CM

## 2025-04-22 DIAGNOSIS — Z23 NEED FOR DIPHTHERIA-TETANUS-PERTUSSIS (TDAP) VACCINE: ICD-10-CM

## 2025-04-22 DIAGNOSIS — I10 ESSENTIAL HYPERTENSION: Chronic | ICD-10-CM

## 2025-04-22 ASSESSMENT — ASTHMA QUESTIONNAIRES
QUESTION_5 LAST FOUR WEEKS HOW WOULD YOU RATE YOUR ASTHMA CONTROL: WELL CONTROLLED
ACT_TOTALSCORE: 20
QUESTION_1 LAST FOUR WEEKS HOW MUCH OF THE TIME DID YOUR ASTHMA KEEP YOU FROM GETTING AS MUCH DONE AT WORK, SCHOOL OR AT HOME: A LITTLE OF THE TIME
QUESTION_3 LAST FOUR WEEKS HOW OFTEN DID YOUR ASTHMA SYMPTOMS (WHEEZING, COUGHING, SHORTNESS OF BREATH, CHEST TIGHTNESS OR PAIN) WAKE YOU UP AT NIGHT OR EARLIER THAN USUAL IN THE MORNING: NOT AT ALL
QUESTION_2 LAST FOUR WEEKS HOW OFTEN HAVE YOU HAD SHORTNESS OF BREATH: NOT AT ALL
QUESTION_4 LAST FOUR WEEKS HOW OFTEN HAVE YOU USED YOUR RESCUE INHALER OR NEBULIZER MEDICATION (SUCH AS ALBUTEROL): ONE OR TWO TIMES PER DAY

## 2025-04-22 NOTE — PROGRESS NOTES
"Preventive Care Visit  [facility]  Pablo Vaughn MD, Family Medicine  Apr 22, 2025      ASSESSMENT/PLAN:    Annual Exam/Preventive Issues   - Labs: Check Lipids, Comp panel and A1C. She'll return when fasting as hoping to get best test of lipids  - Immunizations: Give TDAP as due in 6 weeks  - Cancer screenings:   Had colonoscopy in 2021 with recommendation for follow up in 7 years, I.e. 2028.   Up to date on Pap smears and had recent Mammogram was normal. Sees gynecology.   - Other recommendations:   Send us a copy of new healthcare directives.   Encouraged continued strength training and also Achilles tendon stretch, e.g. Downdog in morning and before bedtime. This may help prevent future plantar fasciitis    -Specific concerns:     Hypertension on Zestoretic   Asthma, doing well on Breo  Discussed nasal breathing  Also, nasal saline washing  Hyperlipidemia with some lifestyle changes  Recheck \"Fasting\" lipids  If still elevated, will likely order CT of Coronary arteries  At risk for DM  Check A1C  We discussed her previously borderline low Vit D  Suggested taking about 1000 international unit(s)/day in the summer months and 2000 international unit(s)/day in the winter time    Pablo Vaughn MD  Family Medicine and Sports Medicine      INTRODUCTION:     Susie is a 60 yo female who I have seen twice before but never for an annual exam. She previously saw Dr. Dong.   She is here for an annual preventive exam.      past medical history has been significant for hypertension, on Zestoretic. Doing well.  Also asthma on Breo. Generally well controlled except when seasonal allergies.     Has \"pre-diabetes\" and Hypercholesterolemia.   Over the past few months, has started strength training. Diet has improved slightly       Has had some GERD and anxiety. Those are both stable    Current Outpatient Medications   Medication Sig Dispense Refill    albuterol (PROAIR HFA/PROVENTIL HFA/VENTOLIN HFA) 108 (90 " Base) MCG/ACT inhaler Inhale 1-2 puffs into the lungs every 6 hours as needed for shortness of breath or wheezing 8.5 g 3    famotidine (PEPCID) 20 MG tablet TAKE 1 TABLET (20 MG) BY MOUTH 2 TIMES DAILY AS NEEDED (BLOATING OR REFLUX) 180 tablet 1    fluticasone (FLONASE) 50 MCG/ACT nasal spray Spray 2 sprays into both nostrils daily 9.9 mL 1    fluticasone-vilanterol (BREO ELLIPTA) 100-25 MCG/ACT inhaler Inhale 1 puff into the lungs daily. 180 each 1    lisinopril-hydrochlorothiazide (ZESTORETIC) 20-25 MG tablet Take 1 tablet by mouth daily. 90 tablet 1    loratadine (CLARITIN) 10 MG tablet Take 10 mg by mouth daily      Multiple Vitamins-Minerals (MULTIVITAMIN ADULT PO)       Probiotic Product (PROBIOTIC DAILY PO)          Patient Active Problem List   Diagnosis    Mild persistent asthma without complication    Carcinoid tumor (H)    Gastroesophageal reflux disease, esophagitis presence not specified    Cat allergies    Surveillance of intrauterine contraceptive device    Screening for colon cancer    Umbilical hernia without obstruction and without gangrene    Essential hypertension    Elevated fasting glucose    At risk for diabetes mellitus    Hyperlipidemia LDL goal <100       Social History     Social History Narrative    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     Splits time between [place] and [place]          Advance Care Planning    Discussed health care directive. She is in need of redoing.           12/15/2024   Social Factors   Worry food won't last until get money to buy more No   Food not last or not have enough money for food? No   Do you have housing? (Housing is defined as stable permanent housing and does not include staying ouside in a car, in a tent, in an abandoned  building, in an overnight shelter, or couch-surfing.) Yes   Are you worried about losing your housing? No   Lack of transportation? No   Unable to get utilities (heat,electricity)? No          No data to display                  Today's PHQ-2 Score:       4/22/2025     9:48 AM   PHQ-2 ( 1999 [employer])   Q1: Little interest or pleasure in doing things 0   Q2: Feeling down, depressed or hopeless 0   PHQ-2 Score 0    Q1: Little interest or pleasure in doing things Not at all   Q2: Feeling down, depressed or hopeless Not at all   PHQ-2 Score 0       Patient-reported            No data to display              Social History     Tobacco Use    Smoking status: Never    Smokeless tobacco: Never   Substance Use Topics    Alcohol use: Yes     Alcohol/week: 2.0 standard drinks of alcohol     Comment: 1 glasses of wine per week    Drug use: No           3/6/2025   LAST FHS-7 RESULTS   1st degree relative breast or ovarian cancer No   Any relative bilateral breast cancer No   Any male have breast cancer No   Any ONE woman have BOTH breast AND ovarian cancer No   Any woman with breast cancer before 50yrs No   2 or more relatives with breast AND/OR ovarian cancer Yes    No   2 or more relatives with breast AND/OR bowel cancer No        Sees Gynecology for women's health.   Recently had normal mammogram.      Multiple values from one day are sorted in reverse-chronological order           Latest Ref Rng & Units 3/6/2024     1:27 PM 6/1/2018    12:00 AM   PAP / HPV   PAP  Negative for Intraepithelial Lesion or Malignancy (NILM)     HPV 16 DNA Negative Negative     HPV 18 DNA Negative Negative     Other HR HPV Negative Negative     PAP-ABSTRACT   See Scanned Document           This result is from an external source.     ASCVD Risk   The 10-year ASCVD risk score (Ashley LR, et al., 2019) is: 3.8%    Values used to calculate the score:      Age: 59 years      Sex: Female      Is Non- : No      Diabetic:  "No      Tobacco smoker: No      Systolic Blood Pressure: 118 mmHg      Is BP treated: Yes      HDL Cholesterol: 60 mg/dL      Total Cholesterol: 271 mg/dL           Reviewed and updated as needed this visit by Provider   Tobacco  Allergies  Meds  Problems  Med Hx  Surg Hx  Fam Hx            Objective    Exam  /74   Pulse 89   Temp 98.7  F (37.1  C)   Ht 1.621 m (5' 3.82\")   Wt 73 kg (161 lb)   SpO2 97%   BMI 27.79 kg/m     Estimated body mass index is 27.79 kg/m  as calculated from the following:    Height as of this encounter: 1.621 m (5' 3.82\").    Weight as of this encounter: 73 kg (161 lb).    Physical Exam  GENERAL: Appears well.  Eyes:  grossly normal to inspection  HENT: ear canals and TM's normal, nose and mouth without ulcers or lesions  NECK: no adenopathy, no asymmetry, masses, or scars  RESP: lungs clear to auscultation - no rales, rhonchi or wheezes  CV: regular rate and rhythm, normal S1 S2, no S3 or S4, no murmur, click or rub, no peripheral edema  ABDOMEN: soft, nontender, no hepatosplenomegaly, no masses and bowel sounds normal  MS: no gross musculoskeletal defects noted, no edema  SKIN: no suspicious lesions or rashes  NEURO: Normal strength and tone, mentation intact and speech normal  PSYCH: mentation appears normal, affect normal/bright        Signed Electronically by: Pablo Vaughn MD    "

## 2025-04-22 NOTE — NURSING NOTE
"Susie  59 year old    Chief Complaint   Patient presents with    Asthma    Hypertension    Physical     Check up- minor question about vitamin d deficiency           Blood pressure 111/74, pulse 99, temperature (!) 95.5  F (35.3  C), height 1.621 m (5' 3.82\"), weight 73 kg (161 lb), SpO2 98%, not currently breastfeeding. Body mass index is 27.79 kg/m .    Patient Active Problem List   Diagnosis    Mild persistent asthma without complication    Carcinoid tumor (H)    Gastroesophageal reflux disease, esophagitis presence not specified    Cat allergies    Surveillance of intrauterine contraceptive device    Screening for colon cancer    Umbilical hernia without obstruction and without gangrene    Essential hypertension    Elevated fasting glucose    At risk for diabetes mellitus    Hyperlipidemia LDL goal <100             Wt Readings from Last 2 Encounters:   04/22/25 73 kg (161 lb)   12/16/24 72.9 kg (160 lb 12 oz)       BP Readings from Last 3 Encounters:   04/22/25 111/74   12/16/24 118/67   11/20/24 132/64                Current Outpatient Medications   Medication Sig Dispense Refill    albuterol (PROAIR HFA/PROVENTIL HFA/VENTOLIN HFA) 108 (90 Base) MCG/ACT inhaler Inhale 1-2 puffs into the lungs every 6 hours as needed for shortness of breath or wheezing 8.5 g 3    famotidine (PEPCID) 20 MG tablet TAKE 1 TABLET (20 MG) BY MOUTH 2 TIMES DAILY AS NEEDED (BLOATING OR REFLUX) 180 tablet 1    fluticasone (FLONASE) 50 MCG/ACT nasal spray Spray 2 sprays into both nostrils daily 9.9 mL 1    fluticasone-vilanterol (BREO ELLIPTA) 100-25 MCG/ACT inhaler Inhale 1 puff into the lungs daily. 180 each 1    lisinopril-hydrochlorothiazide (ZESTORETIC) 20-25 MG tablet Take 1 tablet by mouth daily. 90 tablet 1    loratadine (CLARITIN) 10 MG tablet Take 10 mg by mouth daily      Multiple Vitamins-Minerals (MULTIVITAMIN ADULT PO)       Probiotic Product (PROBIOTIC DAILY PO)       UNABLE TO FIND three times a week MEDICATION NAME: " Wart peel, topical ointment. Pt applies ointment to ankle 3 times weekly.       No current facility-administered medications for this visit.             Social History     Tobacco Use    Smoking status: Never    Smokeless tobacco: Never   Substance Use Topics    Alcohol use: Yes     Alcohol/week: 2.0 standard drinks of alcohol     Comment: 1 glasses of wine per week    Drug use: No             Health Maintenance Due   Topic Date Due    ADVANCE CARE PLANNING  Never done    HEPATITIS B IMMUNIZATION (1 of 3 - 19+ 3-dose series) Never done    INFLUENZA VACCINE (1) 09/01/2024    COVID-19 Vaccine (7 - 2024-25 season) 09/01/2024    ASTHMA ACTION PLAN  01/16/2025        Prior to immunization administration, verified patients identity using patient s name and date of birth. Please see Immunization Activity for additional information.     Screening Questionnaire for Adult Immunization    Are you sick today?   No   Do you have allergies to medications, food, a vaccine component or latex?   Yes   Have you ever had a serious reaction after receiving a vaccination?   No   Do you have a long-term health problem with heart, lung, kidney, or metabolic disease (e.g., diabetes), asthma, a blood disorder, no spleen, complement component deficiency, a cochlear implant, or a spinal fluid leak?  Are you on long-term aspirin therapy?   Yes   Do you have cancer, leukemia, HIV/AIDS, or any other immune system problem?   No   Do you have a parent, brother, or sister with an immune system problem?   No   In the past 3 months, have you taken medications that affect  your immune system, such as prednisone, other steroids, or anticancer drugs; drugs for the treatment of rheumatoid arthritis, Crohn s disease, or psoriasis; or have you had radiation treatments?   No   Have you had a seizure, or a brain or other nervous system problem?   No   During the past year, have you received a transfusion of blood or blood    products, or been given immune  (gamma) globulin or antiviral drug?   No   For women: Are you pregnant or is there a chance you could become       pregnant during the next month?   No   Have you received any vaccinations in the past 4 weeks?   No     Immunization questionnaire was positive for at least one answer.  Notified Dr. Vaughn.      Patient instructed to remain in clinic for 15 minutes afterwards, and to report any adverse reactions.     Screening performed by Teresa Diana LPN on 4/22/2025 at 10:57 AM.          April 22, 2025 10:02 AM

## 2025-04-22 NOTE — PATIENT INSTRUCTIONS
"ASSESSMENT/PLAN:    Annual Exam/Preventive Issues   - Labs: Check Lipids, Comp panel and A1C. She'll return when fasting as hoping to get best test of lipids  - Immunizations: Give TDAP as due in 6 weeks  - Cancer screenings:   Had colonoscopy in 2021 with recommendation for follow up in 7 years, I.e. 2028.   Up to date on Pap smears and had recent Mammogram was normal. Sees gynecology.   - Other recommendations:   Send us a copy of new healthcare directives.   Encouraged continued strength training and also Achilles tendon stretch, e.g. Downdog in morning and before bedtime. This may help prevent future plantar fasciitis    -Specific concerns:     Hypertension on Zestoretic   Asthma, doing well on Breo  Discussed nasal breathing  Also, nasal saline washing  Hyperlipidemia with some lifestyle changes  Recheck \"Fasting\" lipids  If still elevated, will likely order CT of Coronary arteries  At risk for DM  Check A1C  We discussed her previously borderline low Vit D  Suggested taking about 1000 international unit(s)/day in the summer months and 2000 international unit(s)/day in the winter time    Pablo Vaughn MD  Family Medicine and Sports Medicine  "

## 2025-05-28 ENCOUNTER — RESULTS FOLLOW-UP (OUTPATIENT)
Dept: FAMILY MEDICINE | Facility: CLINIC | Age: 60
End: 2025-05-28

## 2025-05-28 ENCOUNTER — LAB (OUTPATIENT)
Dept: LAB | Facility: CLINIC | Age: 60
End: 2025-05-28
Payer: COMMERCIAL

## 2025-05-28 DIAGNOSIS — Z91.89 AT RISK FOR DIABETES MELLITUS: ICD-10-CM

## 2025-05-28 DIAGNOSIS — Z13.228 SCREENING FOR METABOLIC DISORDER: ICD-10-CM

## 2025-05-28 DIAGNOSIS — E78.5 HYPERLIPIDEMIA LDL GOAL <100: ICD-10-CM

## 2025-05-28 LAB
ALBUMIN SERPL BCG-MCNC: 4.5 G/DL (ref 3.5–5.2)
ALP SERPL-CCNC: 79 U/L (ref 40–150)
ALT SERPL W P-5'-P-CCNC: 23 U/L (ref 0–50)
ANION GAP SERPL CALCULATED.3IONS-SCNC: 11 MMOL/L (ref 7–15)
AST SERPL W P-5'-P-CCNC: 29 U/L (ref 0–45)
BILIRUB SERPL-MCNC: 0.5 MG/DL
BUN SERPL-MCNC: 16 MG/DL (ref 6–20)
CALCIUM SERPL-MCNC: 9.8 MG/DL (ref 8.8–10.4)
CHLORIDE SERPL-SCNC: 104 MMOL/L (ref 98–107)
CHOLEST SERPL-MCNC: 272 MG/DL
CREAT SERPL-MCNC: 0.82 MG/DL (ref 0.51–0.95)
EGFRCR SERPLBLD CKD-EPI 2021: 82 ML/MIN/1.73M2
EST. AVERAGE GLUCOSE BLD GHB EST-MCNC: 126 MG/DL
FASTING STATUS PATIENT QL REPORTED: YES
FASTING STATUS PATIENT QL REPORTED: YES
GLUCOSE SERPL-MCNC: 112 MG/DL (ref 70–99)
HBA1C MFR BLD: 6 % (ref 0–5.6)
HCO3 SERPL-SCNC: 26 MMOL/L (ref 22–29)
HDLC SERPL-MCNC: 54 MG/DL
LDLC SERPL CALC-MCNC: 185 MG/DL
NONHDLC SERPL-MCNC: 218 MG/DL
POTASSIUM SERPL-SCNC: 4.5 MMOL/L (ref 3.4–5.3)
PROT SERPL-MCNC: 7.4 G/DL (ref 6.4–8.3)
SODIUM SERPL-SCNC: 141 MMOL/L (ref 135–145)
TRIGL SERPL-MCNC: 164 MG/DL

## 2025-05-28 PROCEDURE — 84075 ASSAY ALKALINE PHOSPHATASE: CPT | Mod: ORL | Performed by: FAMILY MEDICINE

## 2025-05-28 PROCEDURE — 82465 ASSAY BLD/SERUM CHOLESTEROL: CPT | Mod: ORL | Performed by: FAMILY MEDICINE

## 2025-06-03 ENCOUNTER — TELEPHONE (OUTPATIENT)
Dept: FAMILY MEDICINE | Facility: CLINIC | Age: 60
End: 2025-06-03

## 2025-06-03 NOTE — TELEPHONE ENCOUNTER
Left voicemail to schedule.     ----- Message from Pablo Vaughn sent at 6/2/2025  5:04 PM CDT -----  Regarding: Discuss  lab results  Hello. Can you please contact Susie to schedule a visit with me to discuss her cholesterol results. This can be a video visit.   Thanks,  -Sudarshan

## 2025-06-22 DIAGNOSIS — J45.30 MILD PERSISTENT ASTHMA WITHOUT COMPLICATION: Chronic | ICD-10-CM

## 2025-06-24 RX ORDER — FLUTICASONE FUROATE AND VILANTEROL TRIFENATATE 100; 25 UG/1; UG/1
1 POWDER RESPIRATORY (INHALATION) DAILY
Qty: 180 EACH | Refills: 1 | Status: SHIPPED | OUTPATIENT
Start: 2025-06-24

## 2025-06-24 NOTE — TELEPHONE ENCOUNTER
Medication requested: fluticasone-vilanterol (BREO ELLIPTA) 100-25 MCG/ACT inhaler   Last office visit: 4/22/2025  [facility] appointments: none  Medication last refilled: 12/11/2024; #180 + 1 refill  Last qualifying labs:      4/22/2025  9:49 AM   ACT and ATAQ Scores    ACT TOTAL SCORE (Goal Greater than or Equal to 20) 20 ([place])      Prescription approved per [facility] Refill Protocol.    SEBASTIAN Del Rosario, RN  06/24/25, 10:23 AM

## 2025-08-05 DIAGNOSIS — I10 ESSENTIAL HYPERTENSION: Chronic | ICD-10-CM

## 2025-08-05 RX ORDER — LISINOPRIL AND HYDROCHLOROTHIAZIDE 20; 25 MG/1; MG/1
1 TABLET ORAL DAILY
Qty: 90 TABLET | Refills: 3 | Status: SHIPPED | OUTPATIENT
Start: 2025-08-05